# Patient Record
Sex: FEMALE | Race: WHITE | Employment: FULL TIME | URBAN - METROPOLITAN AREA
[De-identification: names, ages, dates, MRNs, and addresses within clinical notes are randomized per-mention and may not be internally consistent; named-entity substitution may affect disease eponyms.]

---

## 2017-10-24 ENCOUNTER — HOSPITAL ENCOUNTER (OUTPATIENT)
Dept: WOMENS IMAGING | Age: 52
Discharge: HOME | End: 2017-10-24
Attending: FAMILY MEDICINE | Admitting: FAMILY MEDICINE

## 2017-10-24 DIAGNOSIS — Z12.39 BREAST CANCER SCREENING: ICD-10-CM

## 2017-10-24 LAB
ALBUMIN SERPL-MCNC: 4.3 GM/DL (ref 3.4–5)
ALP BLD-CCNC: 75 IU/L (ref 40–129)
ALT SERPL-CCNC: 25 U/L (ref 10–40)
ANION GAP SERPL CALCULATED.3IONS-SCNC: 12 MMOL/L (ref 4–16)
AST SERPL-CCNC: 21 IU/L (ref 15–37)
BASOPHILS ABSOLUTE: 0 K/CU MM
BASOPHILS RELATIVE PERCENT: 0.4 % (ref 0–1)
BILIRUB SERPL-MCNC: 1.2 MG/DL (ref 0–1)
BUN BLDV-MCNC: 8 MG/DL (ref 6–23)
CALCIUM SERPL-MCNC: 9.2 MG/DL (ref 8.3–10.6)
CHLORIDE BLD-SCNC: 102 MMOL/L (ref 99–110)
CHOLESTEROL: 221 MG/DL
CO2: 26 MMOL/L (ref 21–32)
CREAT SERPL-MCNC: 0.5 MG/DL (ref 0.6–1.1)
DIFFERENTIAL TYPE: ABNORMAL
EOSINOPHILS ABSOLUTE: 0.1 K/CU MM
EOSINOPHILS RELATIVE PERCENT: 1.6 % (ref 0–3)
GFR AFRICAN AMERICAN: >60 ML/MIN/1.73M2
GFR NON-AFRICAN AMERICAN: >60 ML/MIN/1.73M2
GLUCOSE FASTING: 93 MG/DL (ref 70–99)
HCT VFR BLD CALC: 38.4 % (ref 37–47)
HDLC SERPL-MCNC: 56 MG/DL
HEMOGLOBIN: 12.9 GM/DL (ref 12.5–16)
IMMATURE NEUTROPHIL %: 0.2 % (ref 0–0.43)
LDL CHOLESTEROL DIRECT: 149 MG/DL
LYMPHOCYTES ABSOLUTE: 1.2 K/CU MM
LYMPHOCYTES RELATIVE PERCENT: 24.9 % (ref 24–44)
MCH RBC QN AUTO: 33.9 PG (ref 27–31)
MCHC RBC AUTO-ENTMCNC: 33.6 % (ref 32–36)
MCV RBC AUTO: 100.8 FL (ref 78–100)
MONOCYTES ABSOLUTE: 0.4 K/CU MM
MONOCYTES RELATIVE PERCENT: 7.2 % (ref 0–4)
NUCLEATED RBC %: 0 %
PDW BLD-RTO: 11.8 % (ref 11.7–14.9)
PLATELET # BLD: 191 K/CU MM (ref 140–440)
PMV BLD AUTO: 10.6 FL (ref 7.5–11.1)
POTASSIUM SERPL-SCNC: 4.4 MMOL/L (ref 3.5–5.1)
RBC # BLD: 3.81 M/CU MM (ref 4.2–5.4)
SEGMENTED NEUTROPHILS ABSOLUTE COUNT: 3.3 K/CU MM
SEGMENTED NEUTROPHILS RELATIVE PERCENT: 65.7 % (ref 36–66)
SODIUM BLD-SCNC: 140 MMOL/L (ref 135–145)
T3 FREE: 3 PG/ML (ref 2.3–4.2)
T4 FREE: 1.19 NG/DL (ref 0.9–1.8)
TOTAL IMMATURE NEUTOROPHIL: 0.01 K/CU MM
TOTAL NUCLEATED RBC: 0 K/CU MM
TOTAL PROTEIN: 6.5 GM/DL (ref 6.4–8.2)
TRIGL SERPL-MCNC: 144 MG/DL
TSH HIGH SENSITIVITY: 2.53 UIU/ML (ref 0.27–4.2)
WBC # BLD: 5 K/CU MM (ref 4–10.5)

## 2017-10-26 ENCOUNTER — HOSPITAL ENCOUNTER (OUTPATIENT)
Dept: WOMENS IMAGING | Age: 52
Discharge: HOME | End: 2017-10-26
Attending: FAMILY MEDICINE | Admitting: FAMILY MEDICINE

## 2017-10-26 DIAGNOSIS — R92.0 BREAST MICROCALCIFICATIONS: ICD-10-CM

## 2018-11-07 ENCOUNTER — HOSPITAL ENCOUNTER (OUTPATIENT)
Age: 53
Discharge: HOME | End: 2018-11-07
Payer: COMMERCIAL

## 2018-11-07 LAB
ALBUMIN SERPL-MCNC: 4.5 GM/DL (ref 3.4–5)
ALP BLD-CCNC: 87 IU/L (ref 40–128)
ALT SERPL-CCNC: 13 U/L (ref 10–40)
ANION GAP SERPL CALCULATED.3IONS-SCNC: 13 MMOL/L (ref 4–16)
AST SERPL-CCNC: 17 IU/L (ref 15–37)
BASOPHILS ABSOLUTE: 0 K/CU MM
BASOPHILS RELATIVE PERCENT: 0.7 % (ref 0–1)
BILIRUB SERPL-MCNC: 1.1 MG/DL (ref 0–1)
BUN BLDV-MCNC: 9 MG/DL (ref 6–23)
CALCIUM SERPL-MCNC: 9.2 MG/DL (ref 8.3–10.6)
CHLORIDE BLD-SCNC: 99 MMOL/L (ref 99–110)
CHOLESTEROL: 254 MG/DL
CO2: 26 MMOL/L (ref 21–32)
CREAT SERPL-MCNC: 0.6 MG/DL (ref 0.6–1.1)
DIFFERENTIAL TYPE: ABNORMAL
EOSINOPHILS ABSOLUTE: 0.1 K/CU MM
EOSINOPHILS RELATIVE PERCENT: 1.1 % (ref 0–3)
GFR AFRICAN AMERICAN: >60 ML/MIN/1.73M2
GFR NON-AFRICAN AMERICAN: >60 ML/MIN/1.73M2
GLUCOSE FASTING: 88 MG/DL (ref 70–99)
HCT VFR BLD CALC: 41.2 % (ref 37–47)
HDLC SERPL-MCNC: 63 MG/DL
HEMOGLOBIN: 13.3 GM/DL (ref 12.5–16)
IMMATURE NEUTROPHIL %: 0.7 % (ref 0–0.43)
LDL CHOLESTEROL DIRECT: 186 MG/DL
LYMPHOCYTES ABSOLUTE: 1.4 K/CU MM
LYMPHOCYTES RELATIVE PERCENT: 26.2 % (ref 24–44)
MCH RBC QN AUTO: 32.9 PG (ref 27–31)
MCHC RBC AUTO-ENTMCNC: 32.3 % (ref 32–36)
MCV RBC AUTO: 102 FL (ref 78–100)
MONOCYTES ABSOLUTE: 0.4 K/CU MM
MONOCYTES RELATIVE PERCENT: 7.2 % (ref 0–4)
NUCLEATED RBC %: 0 %
PDW BLD-RTO: 12 % (ref 11.7–14.9)
PLATELET # BLD: 201 K/CU MM (ref 140–440)
PMV BLD AUTO: 10.3 FL (ref 7.5–11.1)
POTASSIUM SERPL-SCNC: 4.1 MMOL/L (ref 3.5–5.1)
RBC # BLD: 4.04 M/CU MM (ref 4.2–5.4)
SEGMENTED NEUTROPHILS ABSOLUTE COUNT: 3.5 K/CU MM
SEGMENTED NEUTROPHILS RELATIVE PERCENT: 64.1 % (ref 36–66)
SODIUM BLD-SCNC: 138 MMOL/L (ref 135–145)
T3 FREE: 2.5 PG/ML (ref 2.3–4.2)
T4 FREE: 0.99 NG/DL (ref 0.9–1.8)
TOTAL IMMATURE NEUTOROPHIL: 0.04 K/CU MM
TOTAL NUCLEATED RBC: 0 K/CU MM
TOTAL PROTEIN: 6.6 GM/DL (ref 6.4–8.2)
TRIGL SERPL-MCNC: 149 MG/DL
TSH HIGH SENSITIVITY: 4 UIU/ML (ref 0.27–4.2)
WBC # BLD: 5.4 K/CU MM (ref 4–10.5)

## 2018-11-07 PROCEDURE — 84439 ASSAY OF FREE THYROXINE: CPT

## 2018-11-07 PROCEDURE — 84481 FREE ASSAY (FT-3): CPT

## 2018-11-07 PROCEDURE — 80061 LIPID PANEL: CPT

## 2018-11-07 PROCEDURE — 80053 COMPREHEN METABOLIC PANEL: CPT

## 2018-11-07 PROCEDURE — 83721 ASSAY OF BLOOD LIPOPROTEIN: CPT

## 2018-11-07 PROCEDURE — 84443 ASSAY THYROID STIM HORMONE: CPT

## 2018-11-07 PROCEDURE — 36415 COLL VENOUS BLD VENIPUNCTURE: CPT

## 2018-11-07 PROCEDURE — 85025 COMPLETE CBC W/AUTO DIFF WBC: CPT

## 2019-09-04 ENCOUNTER — HOSPITAL ENCOUNTER (OUTPATIENT)
Dept: WOMENS IMAGING | Age: 54
Discharge: HOME | End: 2019-09-04
Payer: COMMERCIAL

## 2019-09-04 DIAGNOSIS — Z12.31 VISIT FOR SCREENING MAMMOGRAM: ICD-10-CM

## 2019-09-04 DIAGNOSIS — M25.551 RIGHT HIP PAIN: ICD-10-CM

## 2019-09-04 PROCEDURE — 77063 BREAST TOMOSYNTHESIS BI: CPT

## 2019-09-04 PROCEDURE — 77080 DXA BONE DENSITY AXIAL: CPT

## 2019-09-11 ENCOUNTER — HOSPITAL ENCOUNTER (OUTPATIENT)
Dept: ULTRASOUND IMAGING | Age: 54
Discharge: HOME | End: 2019-09-11
Payer: COMMERCIAL

## 2019-09-11 DIAGNOSIS — R92.8 ABNORMAL MAMMOGRAM: ICD-10-CM

## 2019-09-11 PROCEDURE — 76642 ULTRASOUND BREAST LIMITED: CPT

## 2019-12-30 ENCOUNTER — HOSPITAL ENCOUNTER (OUTPATIENT)
Age: 54
Discharge: HOME | End: 2019-12-30
Payer: COMMERCIAL

## 2019-12-30 ENCOUNTER — HOSPITAL ENCOUNTER (OUTPATIENT)
Dept: GENERAL RADIOLOGY | Age: 54
Discharge: HOME | End: 2019-12-30
Payer: COMMERCIAL

## 2019-12-30 DIAGNOSIS — R05.9 COUGH: ICD-10-CM

## 2019-12-30 LAB
ALBUMIN SERPL-MCNC: 4.4 GM/DL (ref 3.4–5)
ALP BLD-CCNC: 100 IU/L (ref 40–129)
ALT SERPL-CCNC: 15 U/L (ref 10–40)
ANION GAP SERPL CALCULATED.3IONS-SCNC: 11 MMOL/L (ref 4–16)
AST SERPL-CCNC: 15 IU/L (ref 15–37)
BASOPHILS ABSOLUTE: 0.1 K/CU MM
BASOPHILS RELATIVE PERCENT: 0.9 % (ref 0–1)
BILIRUB SERPL-MCNC: 1.1 MG/DL (ref 0–1)
BUN BLDV-MCNC: 12 MG/DL (ref 6–23)
CALCIUM SERPL-MCNC: 9.6 MG/DL (ref 8.3–10.6)
CHLORIDE BLD-SCNC: 101 MMOL/L (ref 99–110)
CHOLESTEROL: 226 MG/DL
CO2: 27 MMOL/L (ref 21–32)
CREAT SERPL-MCNC: 0.6 MG/DL (ref 0.6–1.1)
DIFFERENTIAL TYPE: ABNORMAL
EOSINOPHILS ABSOLUTE: 0.2 K/CU MM
EOSINOPHILS RELATIVE PERCENT: 3 % (ref 0–3)
GFR AFRICAN AMERICAN: >60 ML/MIN/1.73M2
GFR NON-AFRICAN AMERICAN: >60 ML/MIN/1.73M2
GLUCOSE BLD-MCNC: 82 MG/DL (ref 70–99)
HCT VFR BLD CALC: 44.4 % (ref 37–47)
HDLC SERPL-MCNC: 58 MG/DL
HEMOGLOBIN: 13.9 GM/DL (ref 12.5–16)
IMMATURE NEUTROPHIL %: 1.1 % (ref 0–0.43)
LDL CHOLESTEROL DIRECT: 140 MG/DL
LYMPHOCYTES ABSOLUTE: 1.4 K/CU MM
LYMPHOCYTES RELATIVE PERCENT: 24.2 % (ref 24–44)
MCH RBC QN AUTO: 31.5 PG (ref 27–31)
MCHC RBC AUTO-ENTMCNC: 31.3 % (ref 32–36)
MCV RBC AUTO: 100.7 FL (ref 78–100)
MONOCYTES ABSOLUTE: 0.4 K/CU MM
MONOCYTES RELATIVE PERCENT: 7.7 % (ref 0–4)
NUCLEATED RBC %: 0 %
PDW BLD-RTO: 12.1 % (ref 11.7–14.9)
PLATELET # BLD: 233 K/CU MM (ref 140–440)
PMV BLD AUTO: 10.4 FL (ref 7.5–11.1)
POTASSIUM SERPL-SCNC: 4 MMOL/L (ref 3.5–5.1)
RBC # BLD: 4.41 M/CU MM (ref 4.2–5.4)
SEGMENTED NEUTROPHILS ABSOLUTE COUNT: 3.6 K/CU MM
SEGMENTED NEUTROPHILS RELATIVE PERCENT: 63.1 % (ref 36–66)
SODIUM BLD-SCNC: 139 MMOL/L (ref 135–145)
T3 FREE: 2.9 PG/ML (ref 2.3–4.2)
T4 FREE: 1.25 NG/DL (ref 0.9–1.8)
TOTAL IMMATURE NEUTOROPHIL: 0.06 K/CU MM
TOTAL NUCLEATED RBC: 0 K/CU MM
TOTAL PROTEIN: 6.7 GM/DL (ref 6.4–8.2)
TRIGL SERPL-MCNC: 223 MG/DL
TSH HIGH SENSITIVITY: 2.56 UIU/ML (ref 0.27–4.2)
WBC # BLD: 5.7 K/CU MM (ref 4–10.5)

## 2019-12-30 PROCEDURE — 80053 COMPREHEN METABOLIC PANEL: CPT

## 2019-12-30 PROCEDURE — 85025 COMPLETE CBC W/AUTO DIFF WBC: CPT

## 2019-12-30 PROCEDURE — 36415 COLL VENOUS BLD VENIPUNCTURE: CPT

## 2019-12-30 PROCEDURE — 71046 X-RAY EXAM CHEST 2 VIEWS: CPT

## 2019-12-30 PROCEDURE — 80061 LIPID PANEL: CPT

## 2019-12-30 PROCEDURE — 84481 FREE ASSAY (FT-3): CPT

## 2019-12-30 PROCEDURE — 84443 ASSAY THYROID STIM HORMONE: CPT

## 2019-12-30 PROCEDURE — 84439 ASSAY OF FREE THYROXINE: CPT

## 2019-12-30 PROCEDURE — 83721 ASSAY OF BLOOD LIPOPROTEIN: CPT

## 2020-06-03 ENCOUNTER — OFFICE VISIT (OUTPATIENT)
Dept: PRIMARY CARE CLINIC | Age: 55
End: 2020-06-03
Payer: COMMERCIAL

## 2020-06-03 ENCOUNTER — HOSPITAL ENCOUNTER (OUTPATIENT)
Age: 55
Setting detail: SPECIMEN
Discharge: HOME | End: 2020-06-03
Payer: COMMERCIAL

## 2020-06-03 VITALS — OXYGEN SATURATION: 99 % | TEMPERATURE: 97 F | HEART RATE: 77 BPM

## 2020-06-03 PROCEDURE — U0002 COVID-19 LAB TEST NON-CDC: HCPCS

## 2020-06-03 PROCEDURE — 99213 OFFICE O/P EST LOW 20 MIN: CPT | Performed by: FAMILY MEDICINE

## 2020-06-03 RX ORDER — ALBUTEROL SULFATE 90 UG/1
2 AEROSOL, METERED RESPIRATORY (INHALATION) 4 TIMES DAILY PRN
Qty: 1 INHALER | Refills: 2 | Status: SHIPPED | OUTPATIENT
Start: 2020-06-03 | End: 2021-12-15

## 2020-06-03 RX ORDER — PREDNISONE 10 MG/1
10 TABLET ORAL DAILY
Qty: 10 TABLET | Refills: 0 | Status: SHIPPED | OUTPATIENT
Start: 2020-06-03 | End: 2020-06-13

## 2020-06-03 NOTE — PATIENT INSTRUCTIONS
- Your covid-19 testing result takes 3-4 days to come back. [address] will notify the test result if it is POSITIVE. In case of NEGATIVE result, our office will notify you. You can also view the result through  [facility] [address]. - Fluid hydration with plain water was advised. - As needed bronchodilators for shortness of breath( Albuterol)  and short course of oral Prednisone to reduce inflammation  - Stay at home and self quarantine until COVID-19 test result is available. If your COVID-19 test result is POSITIVE, 14 day self quarantine is recommended. If the test results is NEGATIVE, you can end your quarantine and return to your normal routine or work. - Please return to clinic or call us if symptoms are worsened.

## 2020-06-04 LAB
SARS-COV-2: NOT DETECTED
SOURCE: NORMAL

## 2021-02-24 ENCOUNTER — HOSPITAL ENCOUNTER (OUTPATIENT)
Age: 56
Discharge: HOME | End: 2021-02-24
Payer: COMMERCIAL

## 2021-02-24 ENCOUNTER — HOSPITAL ENCOUNTER (OUTPATIENT)
Dept: GENERAL RADIOLOGY | Age: 56
Discharge: HOME | End: 2021-02-24
Payer: COMMERCIAL

## 2021-02-24 DIAGNOSIS — R05.9 COUGH: ICD-10-CM

## 2021-02-24 LAB
ALBUMIN SERPL-MCNC: 4.4 GM/DL (ref 3.4–5)
ALP BLD-CCNC: 82 IU/L (ref 40–128)
ALT SERPL-CCNC: 15 U/L (ref 10–40)
ANION GAP SERPL CALCULATED.3IONS-SCNC: 7 MMOL/L (ref 4–16)
AST SERPL-CCNC: 17 IU/L (ref 15–37)
BASOPHILS ABSOLUTE: 0 K/CU MM
BASOPHILS RELATIVE PERCENT: 0.7 % (ref 0–1)
BILIRUB SERPL-MCNC: 0.8 MG/DL (ref 0–1)
BUN BLDV-MCNC: 15 MG/DL (ref 6–23)
CALCIUM SERPL-MCNC: 9.1 MG/DL (ref 8.3–10.6)
CHLORIDE BLD-SCNC: 103 MMOL/L (ref 99–110)
CHOLESTEROL: 192 MG/DL
CO2: 29 MMOL/L (ref 21–32)
CREAT SERPL-MCNC: 0.6 MG/DL (ref 0.6–1.1)
DIFFERENTIAL TYPE: ABNORMAL
EOSINOPHILS ABSOLUTE: 0.1 K/CU MM
EOSINOPHILS RELATIVE PERCENT: 2.5 % (ref 0–3)
ESTIMATED AVERAGE GLUCOSE: 108 MG/DL
GFR AFRICAN AMERICAN: >60 ML/MIN/1.73M2
GFR NON-AFRICAN AMERICAN: >60 ML/MIN/1.73M2
GLUCOSE BLD-MCNC: 98 MG/DL (ref 70–99)
HBA1C MFR BLD: 5.4 % (ref 4.2–6.3)
HCT VFR BLD CALC: 39.4 % (ref 37–47)
HDLC SERPL-MCNC: 60 MG/DL
HEMOGLOBIN: 12.6 GM/DL (ref 12.5–16)
IMMATURE NEUTROPHIL %: 0.5 % (ref 0–0.43)
LDL CHOLESTEROL DIRECT: 117 MG/DL
LYMPHOCYTES ABSOLUTE: 1.3 K/CU MM
LYMPHOCYTES RELATIVE PERCENT: 29.3 % (ref 24–44)
MCH RBC QN AUTO: 31.9 PG (ref 27–31)
MCHC RBC AUTO-ENTMCNC: 32 % (ref 32–36)
MCV RBC AUTO: 99.7 FL (ref 78–100)
MONOCYTES ABSOLUTE: 0.3 K/CU MM
MONOCYTES RELATIVE PERCENT: 7 % (ref 0–4)
NUCLEATED RBC %: 0 %
PDW BLD-RTO: 11.9 % (ref 11.7–14.9)
PLATELET # BLD: 202 K/CU MM (ref 140–440)
PMV BLD AUTO: 10.4 FL (ref 7.5–11.1)
POTASSIUM SERPL-SCNC: 4.5 MMOL/L (ref 3.5–5.1)
RBC # BLD: 3.95 M/CU MM (ref 4.2–5.4)
SEGMENTED NEUTROPHILS ABSOLUTE COUNT: 2.7 K/CU MM
SEGMENTED NEUTROPHILS RELATIVE PERCENT: 60 % (ref 36–66)
SODIUM BLD-SCNC: 139 MMOL/L (ref 135–145)
T3 FREE: 2.8 PG/ML (ref 2.3–4.2)
T4 FREE: 1.04 NG/DL (ref 0.9–1.8)
TOTAL IMMATURE NEUTOROPHIL: 0.02 K/CU MM
TOTAL NUCLEATED RBC: 0 K/CU MM
TOTAL PROTEIN: 6.7 GM/DL (ref 6.4–8.2)
TRIGL SERPL-MCNC: 93 MG/DL
TSH HIGH SENSITIVITY: 1.65 UIU/ML (ref 0.27–4.2)
WBC # BLD: 4.4 K/CU MM (ref 4–10.5)

## 2021-02-24 PROCEDURE — 84439 ASSAY OF FREE THYROXINE: CPT

## 2021-02-24 PROCEDURE — 83721 ASSAY OF BLOOD LIPOPROTEIN: CPT

## 2021-02-24 PROCEDURE — 71046 X-RAY EXAM CHEST 2 VIEWS: CPT

## 2021-02-24 PROCEDURE — 80061 LIPID PANEL: CPT

## 2021-02-24 PROCEDURE — 80053 COMPREHEN METABOLIC PANEL: CPT

## 2021-02-24 PROCEDURE — 36415 COLL VENOUS BLD VENIPUNCTURE: CPT

## 2021-02-24 PROCEDURE — 84443 ASSAY THYROID STIM HORMONE: CPT

## 2021-02-24 PROCEDURE — 84481 FREE ASSAY (FT-3): CPT

## 2021-02-24 PROCEDURE — 85025 COMPLETE CBC W/AUTO DIFF WBC: CPT

## 2021-02-24 PROCEDURE — 83036 HEMOGLOBIN GLYCOSYLATED A1C: CPT

## 2021-08-03 RX ORDER — CLOBETASOL PROPIONATE 0.5 MG/G
OINTMENT TOPICAL
Qty: 1 TUBE | Refills: 0 | Status: SHIPPED | OUTPATIENT
Start: 2021-08-03 | End: 2021-12-15

## 2021-09-27 ENCOUNTER — HOSPITAL ENCOUNTER (OUTPATIENT)
Dept: WOMENS IMAGING | Age: 56
Discharge: HOME | End: 2021-09-27
Payer: COMMERCIAL

## 2021-09-27 DIAGNOSIS — Z12.31 ENCOUNTER FOR SCREENING MAMMOGRAM FOR MALIGNANT NEOPLASM OF BREAST: ICD-10-CM

## 2021-09-27 PROCEDURE — 77063 BREAST TOMOSYNTHESIS BI: CPT

## 2021-11-10 ENCOUNTER — HOSPITAL ENCOUNTER (OUTPATIENT)
Dept: WOMENS IMAGING | Age: 56
Discharge: HOME | End: 2021-11-10
Payer: COMMERCIAL

## 2021-11-10 ENCOUNTER — HOSPITAL ENCOUNTER (OUTPATIENT)
Dept: ULTRASOUND IMAGING | Age: 56
Discharge: HOME | End: 2021-11-10
Payer: COMMERCIAL

## 2021-11-10 DIAGNOSIS — R92.8 ABNORMAL SCREENING MAMMOGRAM: ICD-10-CM

## 2021-11-10 PROCEDURE — G0279 TOMOSYNTHESIS, MAMMO: HCPCS

## 2021-11-10 PROCEDURE — 76642 ULTRASOUND BREAST LIMITED: CPT

## 2021-12-10 ENCOUNTER — HOSPITAL ENCOUNTER (OUTPATIENT)
Dept: WOMENS IMAGING | Age: 56
Discharge: HOME | End: 2021-12-10
Payer: COMMERCIAL

## 2021-12-10 ENCOUNTER — HOSPITAL ENCOUNTER (OUTPATIENT)
Dept: ULTRASOUND IMAGING | Age: 56
Discharge: HOME | End: 2021-12-10
Payer: COMMERCIAL

## 2021-12-10 DIAGNOSIS — N63.20 LEFT BREAST MASS: ICD-10-CM

## 2021-12-10 PROCEDURE — 88342 IMHCHEM/IMCYTCHM 1ST ANTB: CPT

## 2021-12-10 PROCEDURE — 88360 TUMOR IMMUNOHISTOCHEM/MANUAL: CPT

## 2021-12-10 PROCEDURE — 88341 IMHCHEM/IMCYTCHM EA ADD ANTB: CPT

## 2021-12-10 PROCEDURE — 88305 TISSUE EXAM BY PATHOLOGIST: CPT

## 2021-12-10 PROCEDURE — 2720000010 US BREAST BIOPSY W LOC DEVICE 1ST LESION LEFT

## 2021-12-10 PROCEDURE — 77065 DX MAMMO INCL CAD UNI: CPT

## 2021-12-16 ENCOUNTER — HOSPITAL ENCOUNTER (OUTPATIENT)
Age: 56
Discharge: HOME | End: 2021-12-16
Payer: COMMERCIAL

## 2021-12-16 ENCOUNTER — HOSPITAL ENCOUNTER (OUTPATIENT)
Dept: GENERAL RADIOLOGY | Age: 56
Discharge: HOME | End: 2021-12-16
Payer: COMMERCIAL

## 2021-12-16 DIAGNOSIS — C50.919 TRIPLE NEGATIVE MALIGNANT NEOPLASM OF BREAST (HCC): ICD-10-CM

## 2021-12-16 LAB
ALBUMIN SERPL-MCNC: 4.6 GM/DL (ref 3.4–5)
ALP BLD-CCNC: 155 IU/L (ref 40–129)
ALT SERPL-CCNC: 54 U/L (ref 10–40)
ANION GAP SERPL CALCULATED.3IONS-SCNC: 7 MMOL/L (ref 4–16)
AST SERPL-CCNC: 37 IU/L (ref 15–37)
BASOPHILS ABSOLUTE: 0.1 K/CU MM
BASOPHILS RELATIVE PERCENT: 1 % (ref 0–1)
BILIRUB SERPL-MCNC: 0.6 MG/DL (ref 0–1)
BILIRUBIN DIRECT: 0.2 MG/DL (ref 0–0.3)
BILIRUBIN, INDIRECT: 0.4 MG/DL (ref 0–0.7)
BUN BLDV-MCNC: 11 MG/DL (ref 6–23)
CALCIUM SERPL-MCNC: 9.4 MG/DL (ref 8.3–10.6)
CHLORIDE BLD-SCNC: 101 MMOL/L (ref 99–110)
CO2: 30 MMOL/L (ref 21–32)
CREAT SERPL-MCNC: 0.4 MG/DL (ref 0.6–1.1)
DIFFERENTIAL TYPE: ABNORMAL
EOSINOPHILS ABSOLUTE: 0.1 K/CU MM
EOSINOPHILS RELATIVE PERCENT: 2.9 % (ref 0–3)
GFR AFRICAN AMERICAN: >60 ML/MIN/1.73M2
GFR NON-AFRICAN AMERICAN: >60 ML/MIN/1.73M2
GLUCOSE BLD-MCNC: 100 MG/DL (ref 70–99)
HCT VFR BLD CALC: 39.6 % (ref 37–47)
HEMOGLOBIN: 12.6 GM/DL (ref 12.5–16)
IMMATURE NEUTROPHIL %: 1.3 % (ref 0–0.43)
LYMPHOCYTES ABSOLUTE: 1.4 K/CU MM
LYMPHOCYTES RELATIVE PERCENT: 28.3 % (ref 24–44)
MCH RBC QN AUTO: 32 PG (ref 27–31)
MCHC RBC AUTO-ENTMCNC: 31.8 % (ref 32–36)
MCV RBC AUTO: 100.5 FL (ref 78–100)
MONOCYTES ABSOLUTE: 0.4 K/CU MM
MONOCYTES RELATIVE PERCENT: 9.2 % (ref 0–4)
NUCLEATED RBC %: 0 %
PDW BLD-RTO: 11.6 % (ref 11.7–14.9)
PLATELET # BLD: 244 K/CU MM (ref 140–440)
PMV BLD AUTO: 10.2 FL (ref 7.5–11.1)
POTASSIUM SERPL-SCNC: 4.6 MMOL/L (ref 3.5–5.1)
RBC # BLD: 3.94 M/CU MM (ref 4.2–5.4)
SEGMENTED NEUTROPHILS ABSOLUTE COUNT: 2.8 K/CU MM
SEGMENTED NEUTROPHILS RELATIVE PERCENT: 57.3 % (ref 36–66)
SODIUM BLD-SCNC: 138 MMOL/L (ref 135–145)
TOTAL IMMATURE NEUTOROPHIL: 0.06 K/CU MM
TOTAL NUCLEATED RBC: 0 K/CU MM
TOTAL PROTEIN: 6.7 GM/DL (ref 6.4–8.2)
WBC # BLD: 4.8 K/CU MM (ref 4–10.5)

## 2021-12-16 PROCEDURE — 82248 BILIRUBIN DIRECT: CPT

## 2021-12-16 PROCEDURE — 80053 COMPREHEN METABOLIC PANEL: CPT

## 2021-12-16 PROCEDURE — 36415 COLL VENOUS BLD VENIPUNCTURE: CPT

## 2021-12-16 PROCEDURE — 71046 X-RAY EXAM CHEST 2 VIEWS: CPT

## 2021-12-16 PROCEDURE — 85025 COMPLETE CBC W/AUTO DIFF WBC: CPT

## 2021-12-27 ENCOUNTER — HOSPITAL ENCOUNTER (OUTPATIENT)
Dept: INFUSION THERAPY | Age: 56
Discharge: HOME | End: 2021-12-27
Payer: COMMERCIAL

## 2021-12-27 ENCOUNTER — INITIAL CONSULT (OUTPATIENT)
Dept: ONCOLOGY | Age: 56
End: 2021-12-27
Payer: COMMERCIAL

## 2021-12-27 VITALS
RESPIRATION RATE: 18 BRPM | HEIGHT: 61 IN | TEMPERATURE: 97.5 F | OXYGEN SATURATION: 98 % | BODY MASS INDEX: 44.37 KG/M2 | SYSTOLIC BLOOD PRESSURE: 153 MMHG | DIASTOLIC BLOOD PRESSURE: 88 MMHG | WEIGHT: 235 LBS | HEART RATE: 77 BPM

## 2021-12-27 DIAGNOSIS — C50.312 MALIGNANT NEOPLASM OF LOWER-INNER QUADRANT OF LEFT BREAST IN FEMALE, ESTROGEN RECEPTOR NEGATIVE (HCC): ICD-10-CM

## 2021-12-27 DIAGNOSIS — R74.8 ELEVATED LIVER ENZYMES: ICD-10-CM

## 2021-12-27 DIAGNOSIS — Z17.1 MALIGNANT NEOPLASM OF LOWER-INNER QUADRANT OF LEFT BREAST IN FEMALE, ESTROGEN RECEPTOR NEGATIVE (HCC): ICD-10-CM

## 2021-12-27 DIAGNOSIS — R91.8 LUNG NODULES: ICD-10-CM

## 2021-12-27 PROBLEM — C50.319 MALIGNANT NEOPLASM OF LOWER-INNER QUADRANT OF BREAST IN FEMALE, ESTROGEN RECEPTOR NEGATIVE (HCC): Status: ACTIVE | Noted: 2021-12-27

## 2021-12-27 PROCEDURE — 99202 OFFICE O/P NEW SF 15 MIN: CPT

## 2021-12-27 PROCEDURE — 99204 OFFICE O/P NEW MOD 45 MIN: CPT | Performed by: INTERNAL MEDICINE

## 2021-12-27 RX ORDER — FLUOXETINE HYDROCHLORIDE 20 MG/1
20 CAPSULE ORAL DAILY
COMMUNITY

## 2021-12-27 RX ORDER — IBUPROFEN 600 MG/1
600 TABLET ORAL EVERY 6 HOURS PRN
COMMUNITY

## 2021-12-27 NOTE — PROGRESS NOTES
Patient Name:  Junior Foley  Patient :  1965  Patient MRN:  [id number]     Primary Oncologist: Jamila Rosenberg MD  Referring Provider: Mary Jensen PA-C     Date of Service: 2021      Reason for Consult:  TNBC     Chief Complaint:    Chief Complaint   Patient presents with   Araceli Ybarra Patient       Encounter Diagnosis   Name Primary?  Malignant neoplasm of lower-inner quadrant of left breast in female, estrogen receptor negative ([facility] [address]         HPI:   2021: She had with her  to the clinic today. Reported that she was found to have a mass in the left breast on routine mammogram.  Denied any axial lymphadenopathy. Denied any weight loss. Denied any new bone pains. Denied any focal weakness. Reported blurred vision, needs new glasses. Headaches lately. But reported that she recently quit drinking alcohol. Denied any chest pain or increase shortness of breath. Denied any abdominal pain, nausea vomiting, diarrhea, constipation    21: Mammogram:  Recommend spot gene synthesis compression views and possible ultrasound for   focal asymmetry in the left upper inner breast, middle depth.       BIRADS:   BIRADS - CATEGORY 0       Incomplete: Needs Additional Imaging Evaluation     11/10/21: Mammogram:  0.7 x 0.8 x 0.7 cm suspicious mass left breast 8 o'clock, about 3-4 cm from   the left nipple.  This likely correlates with the mammographic finding. Ultrasound-guided biopsy is advised.       Recommend post biopsy mammographic correlation to confirm that it correlates   with the mammographic finding.       These results and recommendations were discussed with the patient by Dr. Raul Cannon will be assisted to schedule the recommended biopsy by the breast   nurse navigator.  An order will be requested from her referring physician.       BIRADS:   BIRADS - CATEGORY 4B       Intermediate suspicion for malignancy.       Suspicious Abnormality. Biopsy should be considered at this time. consumed 4-5 beers per day and also whiskey sometimes. Currently on sick leave, was an  at [employer] Family History:    Father diagnosed with prostate cancer                                                                                               No Known Allergies    Current Outpatient Medications on File Prior to Visit   Medication Sig Dispense Refill    FLUoxetine (PROZAC) 20 MG capsule Take 20 mg by mouth daily      ibuprofen (ADVIL;MOTRIN) 600 MG tablet Take 600 mg by mouth every 6 hours as needed for Pain      ATORVASTATIN CALCIUM PO Take by mouth      lisinopril (PRINIVIL;ZESTRIL) 20 MG tablet TAKE ONE TABLET BY MOUTH DAILY 90 tablet 1    acyclovir (ZOVIRAX) 400 MG tablet Take 1 tablet by mouth daily 90 tablet 1    conjugated estrogens (PREMARIN) 0.625 MG/GM vaginal cream Place vaginally twice weekly 1 Tube 5     No current facility-administered medications on file prior to visit. Review of Systems:    Constitutional:  No weight loss, No fever, No chills, No night sweats. Energy level fair to poor  Eyes:  No diplopia, No transient or permanent loss of vision, No scotomata. ENT / Mouth:  No epistaxis, No dysphagia, No hoarseness, No oral ulcers, No gingival bleeding. No sore throat, No postnasal drip, No nasal drip, No mouth pain, No sinus pain, No tinnitus, Normal hearing. Cardiovascular:  No chest pain, No palpitations, No syncope, No upper extremity edema, No lower extremity edema, No calf discomfort. Respiratory:  No cough. No hemoptysis, No pleurisy, No wheezing, No dyspnea. Breast:  No breast mass, No pain, No nipple discharge, No change in size, No change in shape. Gastrointestinal:  No abdominal pain, No abdominal cramping, No nausea, No vomiting, No constipation, No diarrhea, No hematochezia, No melena, No jaundice, No dyspepsia, No dysphagia.   Urinary:  No dysuria, No hematuria, No urinary incontinence. Gynecological:  No vaginal discharge, No suprapubic pain, No abnormal vaginal bleeding. (Female Patients Only)  Musculoskeletal:  No muscle pain, No swollen joints, No joint redness, No bone pain, No spine tenderness. Skin:  No rash, No nodules, No pruritus, No lesions. Neurologic:  No confusion, No seizures, No syncope, No tremor, No speech change, No headache, No hiccups, No abnormal gait, No sensory changes, No weakness. Psychiatric:  No depression, No anxiety, Concentration normal.  Endocrine:  No polyuria, No polydipsia, No hot flashes, No thyroid symptoms. Hematologic:  No epistaxis, No gingival bleeding, No petechiae, No ecchymosis. Lymphatic:  No lymphadenopathy, No lymphedema. Allergy / Immunologic:  No eczema, No frequent mucous infections, No frequent respiratory infections, No recurrent urticarial, No frequent skin infections. Vital Signs: BP (!) 153/88 (Site: Right Upper Arm, Position: Sitting, Cuff Size: Large Adult)   Pulse 77   Temp 97.5 °F (36.4 °C) (Temporal)   Resp 18   Ht 5' 1\" (1.549 m)   Wt 235 lb (106.6 kg)   SpO2 98%   BMI 44.40 kg/m²      CONSTITUTIONAL: awake, alert, obese, tired appearing  EYES: IGNACIO, No pallor or any icterus  ENT: ATNC  NECK: No JVD  HEMATOLOGIC/LYMPHATIC: no cervical, supraclavicular or axillary lymphadenopathy   LUNGS: Occasional wheeze  CARDIOVASCULAR: s1s2 rrr no murmurs  ABDOMEN: soft ntnd bs pos  MUSCULOSKELETAL: full range of motion noted, tone is normal  NEUROLOGIC: GI  SKIN: No rash  EXTREMITIES: no LE edema bilaterally  Breast, left breast mass, did not initiate any lymphadenopathy.   No masses in the right breast or right lymphadenopathy       Labs:  Hematology:  Lab Results   Component Value Date    WBC 4.8 12/16/2021    RBC 3.94 (L) 12/16/2021    HGB 12.6 12/16/2021    HCT 39.6 12/16/2021    .5 (H) 12/16/2021    MCH 32.0 (H) 12/16/2021    MCHC 31.8 (L) 12/16/2021    RDW 11.6 (L) 12/16/2021  12/16/2021    MPV 10.2 12/16/2021    SEGSPCT 57.3 12/16/2021    EOSRELPCT 2.9 12/16/2021    BASOPCT 1.0 12/16/2021    LYMPHOPCT 28.3 12/16/2021    MONOPCT 9.2 (H) 12/16/2021    SEGSABS 2.8 12/16/2021    EOSABS 0.1 12/16/2021    BASOSABS 0.1 12/16/2021    LYMPHSABS 1.4 12/16/2021    MONOSABS 0.4 12/16/2021    DIFFTYPE AUTOMATED DIFFERENTIAL 12/16/2021     No results found for: ESR  Chemistry:  Lab Results   Component Value Date     12/16/2021    K 4.6 12/16/2021     12/16/2021    CO2 30 12/16/2021    BUN 11 12/16/2021    CREATININE 0.4 (L) 12/16/2021    GLUCOSE 100 (H) 12/16/2021    CALCIUM 9.4 12/16/2021    PROT 6.7 12/16/2021    LABALBU 4.6 12/16/2021    BILITOT 0.6 12/16/2021    ALKPHOS 155 (H) 12/16/2021    AST 37 12/16/2021    ALT 54 (H) 12/16/2021    LABGLOM >60 12/16/2021    GFRAA >60 12/16/2021    AGRATIO 1.7 10/11/2016    GLOB 2.4 10/11/2016     No results found for: MMA, LDH, HOMOCYSTEINE  No components found for: LD  Lab Results   Component Value Date    TSHHS 1.650 02/24/2021    T4FREE 1.04 02/24/2021    FT3 2.8 02/24/2021     Immunology:  Lab Results   Component Value Date    PROT 6.7 12/16/2021     No results found for: AMBER ClarkR  No results found for: B2M  Coagulation Panel:  No results found for: PROTIME, INR, APTT, DDIMER  Anemia Panel:  No results found for: [id number], FOLATE  Tumor Markers:  No results found for: , CEA, , LABCA2, PSA     Observations:  ECOG:  No data recorded     Assessment & Plan:                                                          Triple negative breast cancer, left, grade 2, no evidence of any axial lymphadenopathy per available imaging studies. PET scan ordered. Will obtain tumor markers. Discussed the findings, diagnosis, possible staging and also discussed neoadjuvant chemotherapy with AC followed by T followed by surgical resection followed by radiation pending surgical modality.   Discussed the adverse effects of doxorubicin, cyclophosphamide and Taxol. Answered all questions. PORT and OCM requested. Echocardiogram ordered. Dose adjustments according to adverse effects and cytopenias. Recommend growth factor support. Discussed genetic testing and counseling, consult placed. Will follow per NCCN guidelines after completion of all the treatments    Mild elevation of the liver enzymes, most probably secondary to alcohol. Continue to monitor. Recommend completing cessation of alcohol. Continue other medical care. Thank you for letting us be part of the care and will follow along. Discussed above findings and plan with her and she voiced understanding. Answered all her questions. Discussed healthy lifestyle including healthy diet, regular exercise as tolerated. Also discussed importance of being up-to-date with age-appropriate screening tools. Recommend follow-up with primary care physician and other specialists. Please do not hesitate to contact us if you need further information. Return to clinic January 19, 2022 or earlier if new Sx    I have recommended that the patient follow CDC guidelines for prevention of COVID-19 infection.     [id number]

## 2021-12-27 NOTE — PROGRESS NOTES
MA Rooming Questions  Patient: Alexandro Bui  MRN: [id number]    Date: 12/27/2021        JACKELIN Alejandra

## 2021-12-28 ENCOUNTER — TELEPHONE (OUTPATIENT)
Dept: ONCOLOGY | Age: 56
End: 2021-12-28

## 2021-12-28 NOTE — TELEPHONE ENCOUNTER
Pt is going for Pet scan on 1/17 at [facility] for a 100 arrival for a 130 appt-- gave pet prep-- left message for pt with all information

## 2022-01-05 ENCOUNTER — CLINICAL DOCUMENTATION (OUTPATIENT)
Dept: CASE MANAGEMENT | Age: 57
End: 2022-01-05

## 2022-01-05 NOTE — PROGRESS NOTES
Spoke with patient on phone. Reviewed upcoming appointments. Patient will have her port placement at [facility] on Monday, 1/10/22 per Dr. Franklin Reid. Echo scheduled for Tuesday, 1/11/22 at 1000 at [facility]. PET/CT scheduled for Monday, 1/17/22 at 1330 at [facility]. Will be scheduled the week of 1/17/22 for chemo education and first chemotherapy treatment [facility]). Patient voiced understanding of all above. Direct contact information given for patient to call with any other questions/concerns.

## 2022-01-12 ENCOUNTER — HOSPITAL ENCOUNTER (OUTPATIENT)
Dept: NON INVASIVE DIAGNOSTICS | Age: 57
Discharge: HOME | End: 2022-01-12
Payer: COMMERCIAL

## 2022-01-12 DIAGNOSIS — C50.312 MALIGNANT NEOPLASM OF LOWER-INNER QUADRANT OF LEFT BREAST IN FEMALE, ESTROGEN RECEPTOR NEGATIVE (HCC): ICD-10-CM

## 2022-01-12 DIAGNOSIS — Z17.1 MALIGNANT NEOPLASM OF LOWER-INNER QUADRANT OF LEFT BREAST IN FEMALE, ESTROGEN RECEPTOR NEGATIVE (HCC): ICD-10-CM

## 2022-01-12 PROCEDURE — 93308 TTE F-UP OR LMTD: CPT

## 2022-01-13 ENCOUNTER — CLINICAL DOCUMENTATION (OUTPATIENT)
Dept: CASE MANAGEMENT | Age: 57
End: 2022-01-13

## 2022-01-13 NOTE — PROGRESS NOTES
Patient scheduled for chemo education on Wednesday, 1/19/22 at 1300 and first chemotherapy treatment [facility]) on Thursday, 1/20/22 at 0930. Patient called with appointments - message left.

## 2022-01-17 ENCOUNTER — HOSPITAL ENCOUNTER (OUTPATIENT)
Dept: PET IMAGING | Age: 57
Discharge: HOME | End: 2022-01-17
Payer: COMMERCIAL

## 2022-01-17 DIAGNOSIS — C50.919 TRIPLE NEGATIVE MALIGNANT NEOPLASM OF BREAST (HCC): ICD-10-CM

## 2022-01-17 PROCEDURE — 3430000000 HC RX DIAGNOSTIC RADIOPHARMACEUTICAL: Performed by: SURGERY

## 2022-01-17 PROCEDURE — 2580000003 HC RX 258: Performed by: SURGERY

## 2022-01-17 PROCEDURE — A9552 F18 FDG: HCPCS | Performed by: SURGERY

## 2022-01-17 PROCEDURE — 78815 PET IMAGE W/CT SKULL-THIGH: CPT

## 2022-01-17 RX ORDER — FLUDEOXYGLUCOSE F 18 200 MCI/ML
17.21 INJECTION, SOLUTION INTRAVENOUS
Status: COMPLETED | OUTPATIENT
Start: 2022-01-17 | End: 2022-01-17

## 2022-01-17 RX ORDER — FLUDEOXYGLUCOSE F 18 200 MCI/ML
INJECTION, SOLUTION INTRAVENOUS
Status: CANCELLED | OUTPATIENT
Start: 2022-01-17

## 2022-01-17 RX ORDER — SODIUM CHLORIDE 0.9 % (FLUSH) 0.9 %
10 SYRINGE (ML) INJECTION PRN
Status: CANCELLED | OUTPATIENT
Start: 2022-01-17

## 2022-01-17 RX ORDER — SODIUM CHLORIDE 0.9 % (FLUSH) 0.9 %
10 SYRINGE (ML) INJECTION PRN
Status: COMPLETED | OUTPATIENT
Start: 2022-01-17 | End: 2022-01-17

## 2022-01-17 RX ADMIN — SODIUM CHLORIDE, PRESERVATIVE FREE 10 ML: 5 INJECTION INTRAVENOUS at 13:13

## 2022-01-17 RX ADMIN — FLUDEOXYGLUCOSE F 18 17.21 MILLICURIE: 200 INJECTION, SOLUTION INTRAVENOUS at 13:13

## 2022-01-18 ENCOUNTER — OFFICE VISIT (OUTPATIENT)
Dept: ONCOLOGY | Age: 57
End: 2022-01-18
Payer: COMMERCIAL

## 2022-01-18 ENCOUNTER — HOSPITAL ENCOUNTER (OUTPATIENT)
Dept: INFUSION THERAPY | Age: 57
Discharge: HOME | End: 2022-01-18

## 2022-01-18 VITALS
SYSTOLIC BLOOD PRESSURE: 132 MMHG | TEMPERATURE: 97.8 F | DIASTOLIC BLOOD PRESSURE: 81 MMHG | HEIGHT: 61 IN | BODY MASS INDEX: 44.93 KG/M2 | OXYGEN SATURATION: 97 % | HEART RATE: 86 BPM | WEIGHT: 238 LBS

## 2022-01-18 DIAGNOSIS — Z17.1 MALIGNANT NEOPLASM OF LOWER-INNER QUADRANT OF LEFT BREAST IN FEMALE, ESTROGEN RECEPTOR NEGATIVE (HCC): Primary | ICD-10-CM

## 2022-01-18 DIAGNOSIS — R91.8 LUNG NODULES: ICD-10-CM

## 2022-01-18 DIAGNOSIS — R74.8 ELEVATED LIVER ENZYMES: ICD-10-CM

## 2022-01-18 DIAGNOSIS — C50.312 MALIGNANT NEOPLASM OF LOWER-INNER QUADRANT OF LEFT BREAST IN FEMALE, ESTROGEN RECEPTOR NEGATIVE (HCC): Primary | ICD-10-CM

## 2022-01-18 PROCEDURE — 99214 OFFICE O/P EST MOD 30 MIN: CPT | Performed by: INTERNAL MEDICINE

## 2022-01-18 ASSESSMENT — PATIENT HEALTH QUESTIONNAIRE - PHQ9
SUM OF ALL RESPONSES TO PHQ QUESTIONS 1-9: 0
1. LITTLE INTEREST OR PLEASURE IN DOING THINGS: 0
SUM OF ALL RESPONSES TO PHQ QUESTIONS 1-9: 0
SUM OF ALL RESPONSES TO PHQ QUESTIONS 1-9: 0
SUM OF ALL RESPONSES TO PHQ9 QUESTIONS 1 & 2: 0
SUM OF ALL RESPONSES TO PHQ QUESTIONS 1-9: 0
2. FEELING DOWN, DEPRESSED OR HOPELESS: 0

## 2022-01-18 NOTE — PROGRESS NOTES
MA Rooming Questions  Patient: Karolina Nichols  MRN: [id number]    Date: 1/18/2022        1. Do you have any new issues? yes - port was placed 01/10 and it is itchy          2. Do you need any refills on medications?    no    3. Have you had any imaging done since your last visit? yes - 01/06, pet ct 01/17    4. Have you been hospitalized or seen in the emergency room since your last visit here?   no    5. Did the patient have a depression screening completed today?  Yes    PHQ-9 Total Score: 0 (1/18/2022  2:04 PM)       PHQ-9 Given to (if applicable):               PHQ-9 Score (if applicable):                     [] Positive     []  Negative              Does question #9 need addressed (if applicable)                     [] Yes    []  No               Nya De La Torre CMA

## 2022-01-18 NOTE — PROGRESS NOTES
Patient Name:  Tamera Benitez  Patient :  1965  Patient MRN:  [id number]     Primary Oncologist: Mine Montgomery MD  Referring Provider: Libby Varghese PA-C     Date of Service: 2022      Reason for Consult:  TNBC     Chief Complaint:    Chief Complaint   Patient presents with    Follow-up       Encounter Diagnoses   Name Primary?  Malignant neoplasm of lower-inner quadrant of left breast in female, estrogen receptor negative ([address] Yes    Elevated liver enzymes     Lung nodules         HPI:   2021: She had with her  to the clinic today. Reported that she was found to have a mass in the left breast on routine mammogram.  Denied any axial lymphadenopathy. Denied any weight loss. Denied any new bone pains. Denied any focal weakness. Reported blurred vision, needs new glasses. Headaches lately. But reported that she recently quit drinking alcohol. Denied any chest pain or increase shortness of breath. Denied any abdominal pain, nausea vomiting, diarrhea, constipation    21: Mammogram:  Recommend spot gene synthesis compression views and possible ultrasound for   focal asymmetry in the left upper inner breast, middle depth.       BIRADS:   BIRADS - CATEGORY 0       Incomplete: Needs Additional Imaging Evaluation     11/10/21: Mammogram:  0.7 x 0.8 x 0.7 cm suspicious mass left breast 8 o'clock, about 3-4 cm from   the left nipple.  This likely correlates with the mammographic finding. Ultrasound-guided biopsy is advised.       Recommend post biopsy mammographic correlation to confirm that it correlates   with the mammographic finding.       These results and recommendations were discussed with the patient by Dr. Mita Sparks will be assisted to schedule the recommended biopsy by the breast   nurse navigator.  An order will be requested from her referring physician.       BIRADS:   BIRADS - CATEGORY 4B       Intermediate suspicion for malignancy.       Suspicious Abnormality. Social History:   Lives with a partner. Has 2 children 22 and 35, 2 daughters. Quit smoking about 25 years ago prior to which she smoked 1 pack/day for 7 years. Quit drinking alcohol on 12/2/2021 prior to which she consumed 4-5 beers per day and also whiskey sometimes. Currently on sick leave, was an  at [employer] Family History:    Father diagnosed with prostate cancer                                                                                               No Known Allergies    Current Outpatient Medications on File Prior to Visit   Medication Sig Dispense Refill    FLUoxetine (PROZAC) 20 MG capsule Take 20 mg by mouth daily      ibuprofen (ADVIL;MOTRIN) 600 MG tablet Take 600 mg by mouth every 6 hours as needed for Pain      ATORVASTATIN CALCIUM PO Take by mouth      lisinopril (PRINIVIL;ZESTRIL) 20 MG tablet TAKE ONE TABLET BY MOUTH DAILY 90 tablet 1    acyclovir (ZOVIRAX) 400 MG tablet Take 1 tablet by mouth daily 90 tablet 1    conjugated estrogens (PREMARIN) 0.625 MG/GM vaginal cream Place vaginally twice weekly 1 Tube 5     No current facility-administered medications on file prior to visit. Interval history:1/18/22: She arrived with her partner to the clinic today. Overall feels good. No chest pain, increased sob, fever, cough. No abdominal pain, nausea, emesis. No Constipation. No overt bleeding. Review of Systems:    Constitutional:  No weight loss, No fever, No chills, No night sweats. Energy level fair to poor  Eyes:  No diplopia, No transient or permanent loss of vision, No scotomata. ENT / Mouth:  No epistaxis, No dysphagia, No hoarseness, No oral ulcers, No gingival bleeding. No sore throat, No postnasal drip, No nasal drip, No mouth pain, No sinus pain, No tinnitus, Normal hearing.   Cardiovascular:  No chest pain, No palpitations, No syncope, No upper extremity edema, No lower extremity edema, No calf discomfort. Respiratory:  No cough. No hemoptysis, No pleurisy, No wheezing, No dyspnea. Breast:  No breast mass, No pain, No nipple discharge, No change in size, No change in shape. Gastrointestinal:  No abdominal pain, No abdominal cramping, No nausea, No vomiting, No constipation, No diarrhea, No hematochezia, No melena, No jaundice, No dyspepsia, No dysphagia. Urinary:  No dysuria, No hematuria, No urinary incontinence. Gynecological:  No vaginal discharge, No suprapubic pain, No abnormal vaginal bleeding. (Female Patients Only)  Musculoskeletal:  No muscle pain, No swollen joints, No joint redness, No bone pain, No spine tenderness. Skin:  No rash, No nodules, No pruritus, No lesions. Neurologic:  No confusion, No seizures, No syncope, No tremor, No speech change, No headache, No hiccups, No abnormal gait, No sensory changes, No weakness. Psychiatric:  No depression, No anxiety, Concentration normal.  Endocrine:  No polyuria, No polydipsia, No hot flashes, No thyroid symptoms. Hematologic:  No epistaxis, No gingival bleeding, No petechiae, No ecchymosis. Lymphatic:  No lymphadenopathy, No lymphedema. Allergy / Immunologic:  No eczema, No frequent mucous infections, No frequent respiratory infections, No recurrent urticarial, No frequent skin infections. Vital Signs: /81 (Site: Right Upper Arm, Position: Sitting, Cuff Size: Large Adult)   Pulse 86   Temp 97.8 °F (36.6 °C) (Temporal)   Ht 5' 1\" (1.549 m)   Wt 238 lb (108 kg)   SpO2 97%   BMI 44.97 kg/m²      CONSTITUTIONAL: awake, alert, obese, tired appearing  EYES: IGNACIO, No pallor or any icterus  ENT: ATNC  NECK: No JVD, port in the right side, slight edema and ttp. Mild erythema.    HEMATOLOGIC/LYMPHATIC: no cervical, supraclavicular or axillary lymphadenopathy   LUNGS: Occasional wheeze  CARDIOVASCULAR: s1s2 rrr no murmurs  ABDOMEN: soft ntnd bs pos  MUSCULOSKELETAL: full range of motion noted, tone is normal  NEUROLOGIC: GI  SKIN: No rash  EXTREMITIES: no LE edema bilaterally  Breast, left breast mass, did not initiate any lymphadenopathy.   No masses in the right breast or right lymphadenopathy       Labs:  Hematology:  Lab Results   Component Value Date    WBC 4.8 12/16/2021    RBC 3.94 (L) 12/16/2021    HGB 12.6 12/16/2021    HCT 39.6 12/16/2021    .5 (H) 12/16/2021    MCH 32.0 (H) 12/16/2021    MCHC 31.8 (L) 12/16/2021    RDW 11.6 (L) 12/16/2021     12/16/2021    MPV 10.2 12/16/2021    SEGSPCT 57.3 12/16/2021    EOSRELPCT 2.9 12/16/2021    BASOPCT 1.0 12/16/2021    LYMPHOPCT 28.3 12/16/2021    MONOPCT 9.2 (H) 12/16/2021    SEGSABS 2.8 12/16/2021    EOSABS 0.1 12/16/2021    BASOSABS 0.1 12/16/2021    LYMPHSABS 1.4 12/16/2021    MONOSABS 0.4 12/16/2021    DIFFTYPE AUTOMATED DIFFERENTIAL 12/16/2021     No results found for: ESR  Chemistry:  Lab Results   Component Value Date     12/16/2021    K 4.6 12/16/2021     12/16/2021    CO2 30 12/16/2021    BUN 11 12/16/2021    CREATININE 0.4 (L) 12/16/2021    GLUCOSE 100 (H) 12/16/2021    CALCIUM 9.4 12/16/2021    PROT 6.7 12/16/2021    LABALBU 4.6 12/16/2021    BILITOT 0.6 12/16/2021    ALKPHOS 155 (H) 12/16/2021    AST 37 12/16/2021    ALT 54 (H) 12/16/2021    LABGLOM >60 12/16/2021    GFRAA >60 12/16/2021    AGRATIO 1.7 10/11/2016    GLOB 2.4 10/11/2016     No results found for: MMA, LDH, HOMOCYSTEINE  No components found for: LD  Lab Results   Component Value Date    TSHHS 1.650 02/24/2021    T4FREE 1.04 02/24/2021    FT3 2.8 02/24/2021     Immunology:  Lab Results   Component Value Date    PROT 6.7 12/16/2021     No results found for: Gomez Masters, AMBERR  No results found for: B2M  Coagulation Panel:  No results found for: PROTIME, INR, APTT, DDIMER  Anemia Panel:  No results found for: [id number], FOLATE  Tumor Markers:  No results found for: , CEA, , LABCA2, PSA     Observations:  ECOG:  PHQ-9 Total Score: 0 (1/18/2022  2:04 PM)       Assessment & Plan:                                                          Triple negative breast cancer, left, 1.1 cm, grade 2, no evidence of any axial lymphadenopathy per available imaging studies. PET scan jan 2022 with no other met disease. Will obtain tumor markers. Discussed the findings, diagnosis, possible staging and also discussed neoadjuvant chemotherapy with AC followed by T followed by surgical resection followed by radiation pending surgical modality. Discussed the adverse effects of doxorubicin, cyclophosphamide and Taxol. Answered all questions. PORT completed and OCM pending. Echocardiogram jan 12 2022 with preserved EF. Dose adjustments according to adverse effects and cytopenias. Recommend growth factor support. Discussed genetic testing and counseling, consult placed. Will follow per NCCN guidelines after completion of all the treatments    Mild elevation of the liver enzymes, most probably secondary to alcohol. Continue to monitor. Recommend complete strict cessation of alcohol. Continue other medical care. Thank you for letting us be part of the care and will follow along. Discussed above findings and plan with her and she voiced understanding. Answered all her questions. Discussed healthy lifestyle including healthy diet, regular exercise as tolerated. Also discussed importance of being up-to-date with age-appropriate screening tools. Recommend follow-up with primary care physician and other specialists. Please do not hesitate to contact us if you need further information. Return to clinic feb 3 2022 or earlier if new Sx    I have recommended that the patient follow CDC guidelines for prevention of COVID-19 infection. Received COVID vaccine, booster not yet. No Flu vaccine either, never taken. Encouraged her.      [address]

## 2022-01-18 NOTE — PROGRESS NOTES
Patient Name: Casie Churchill    Patient : 1965     Patient MRN: [id number]       Date: 2022                                                                                                   CHEMOTHERAPY TREATMENT PLAN    Care Team  Medical Oncologist: Ana Escobedo MD  Surgeon: Dr. Eileen José Oncologist:   Primary Care Physician: Cayla Headley PA-C         Casie Churchill  1965  [id number]        Date: 2022    Learning Needs Assessment  Before the treatment plan was discussed and the consent was signed, the care team assessed the patient's learning needs. This includes their ability to assume responsibility for managing their therapy. Diagnosis      Malignant neoplasm of lower inner quadrant of breast ER negative    The Goal of My Therapy is    (  ) To cure my cancer  (x) To shrink the tumor prior to surgery  (  ) Increase my chance of cure after surgery  (  ) Help me live as long as possible with the highest quality of life. I know that a cure is not possible.      Prognosis    ( x ) Curable  (  ) Palliative    Plan Of Treatment    Intent:  (x  ) Neoadjuvant   (  ) Adjuvant   (  ) Control    Treatment Regimen  (including supportive meds)                             Frequency                                               Duration     A - Adriamycin® (doxorubicin)  C - Cyclophosphamide (Cytoxan®)   Every two weeks  With neulasta support  Followed by weekly taxol x 12    Expected Response to Treatment    ( x ) This therapy could cure your disease  (  ) This therapy has a good chance of helping you live 1 year or more compared to without it  (  ) This therapy has a good chance of helping you feel better or making you live months longer compared to without it     Quality Of Life    (  ) Expect that you will be able to continue all normal activities  (x  ) Expect that you will have some side effects but should be able to maintain normal activities  (  ) Expect that you will be unable to work but able to perform light duties at home  (  ) Expect that you will be able to perform light duties and will need assistance with self care    Treatment Benefits and Harms     1. Patient taught on all common and rare toxicities regarding their treatment, disease process and drug regimen. This includes the short and long-         term effects of therapy (including infertility risks when appropriate). This also includes drug-drug and drug-food interactions when appropriate. Patient was educated when to call [facility] with adverse effects and symptoms. 2.    Patient was taught safe handling and storage of medications in the home (when appropriate) and procedures for handling body sections and             waste in the home. 3.    Patient was taught on planned for missed doses and follow-up plans during treatment, including sol of provider visits and labs. 4.    Patient signed consent on treatment and drug information sheets were given. Alternative To Recommended Treatment    (  ) Palliative or Hospice  (  ) Second Opinion  ( x ) Other    Patient Care Management     Advance Care Planning completed: paperwork provided   ( x ) Yes   (  ) No   Pain Care Plan entered (as applicable):    ( x ) Yes   (  ) No   Comments:  PHQ-9 completed (Follow-up plan as applicable):   ( x ) Yes   (  ) No   Comments:  Distress needs addressed with the patient:    ( x ) Yes   (  ) No   Distress areas needing addressed further:     Patient was educated on the expectations and their responsibility to schedule their follow-up appointments. The patient was also educated that if they refuse to show-up to their appointment or refuse to schedule a follow-up appointment that it is their responsibility to call [facility] in a timely manner to schedule their next appointment. The patient was educated on [facility] policy and that the patient is ultimately responsible for monitoring their appointments and adhering to the schedule.     (x ) Yes   (   ) No    Estimated Out of Pocket Costs discussed per the patient per financial navigator. Patient Consented and taught per Nurse Navigator. .  Today, time spent with the patient discussing the intent and schedule of their treatment. The patient was given a copy of their treatment summary. Questions and concerns were addressed with the patient. Time spent with the patient face to face today was  60 minutes, counseling and coordination of care dominated more than 50% of this patient encounter. Kylie Steve presented for chemo education for Adriamycin, Cytoxan followed by Taxol. We discussed potential AE including, but not limited to: infusion reaction, myelosuppression, cardiotoxicity, neuropathy, nausea, vomiting, stomatitis, discolored urine/hemorrhagic cystitis, alopecia, hepatotoxicity, constipation, diarrhea, nail discoloration, development of secondary cancers, etc. She is asked to call our office for temperature 100.4 or above or with any uncontrolled/unexpected side effects. Dexamethasone, zofran and olanzapine per protocol. She verbalized understanding and is willing to proceed. Advance directive: paperwork provided    Counseling: declines    [facility]: referral placed. Echo: 1/12/22 EF 50-55%    Mediport: placed by Dr. Say Goyal 1/10/22    PET CT 1/17/22- no metastatic disease    BP (!) 144/77 (Site: Left Lower Arm, Position: Sitting, Cuff Size: Medium Adult)   Pulse 81   Temp 96.8 °F (36 °C) (Temporal)   Resp 18   Ht 5' 1\" (1.549 m)   Wt 242 lb (109.8 kg)   SpO2 96%   BMI 45.73 kg/m²     Physical Exam  Vitals reviewed. HENT:      Head: Normocephalic. Mouth/Throat:      Mouth: Mucous membranes are moist.   Eyes:      Extraocular Movements: Extraocular movements intact. Cardiovascular:      Rate and Rhythm: Normal rate and regular rhythm. Pulses: Normal pulses.    Pulmonary:      Effort: Pulmonary effort is normal.   Abdominal:      General: Bowel sounds are normal.      Palpations: Abdomen is soft. Musculoskeletal:         General: Normal range of motion. Skin:     General: Skin is warm. Neurological:      Mental Status: She is alert and oriented to person, place, and time. Psychiatric:         Mood and Affect: Mood normal.         Behavior: Behavior normal.         Thought Content:  Thought content normal.         Judgment: Judgment normal.

## 2022-01-19 ENCOUNTER — CLINICAL DOCUMENTATION (OUTPATIENT)
Dept: CASE MANAGEMENT | Age: 57
End: 2022-01-19

## 2022-01-19 ENCOUNTER — HOSPITAL ENCOUNTER (OUTPATIENT)
Dept: INFUSION THERAPY | Age: 57
Discharge: HOME | End: 2022-01-19
Payer: COMMERCIAL

## 2022-01-19 ENCOUNTER — OFFICE VISIT (OUTPATIENT)
Dept: ONCOLOGY | Age: 57
End: 2022-01-19
Payer: COMMERCIAL

## 2022-01-19 VITALS
HEIGHT: 61 IN | WEIGHT: 242 LBS | SYSTOLIC BLOOD PRESSURE: 144 MMHG | BODY MASS INDEX: 45.69 KG/M2 | OXYGEN SATURATION: 96 % | DIASTOLIC BLOOD PRESSURE: 77 MMHG | RESPIRATION RATE: 18 BRPM | TEMPERATURE: 96.8 F | HEART RATE: 81 BPM

## 2022-01-19 DIAGNOSIS — R91.8 LUNG NODULES: ICD-10-CM

## 2022-01-19 DIAGNOSIS — Z71.9 ENCOUNTER FOR EDUCATION: ICD-10-CM

## 2022-01-19 DIAGNOSIS — C50.312 MALIGNANT NEOPLASM OF LOWER-INNER QUADRANT OF LEFT BREAST IN FEMALE, ESTROGEN RECEPTOR NEGATIVE (HCC): Primary | ICD-10-CM

## 2022-01-19 DIAGNOSIS — C50.312 MALIGNANT NEOPLASM OF LOWER-INNER QUADRANT OF LEFT BREAST IN FEMALE, ESTROGEN RECEPTOR NEGATIVE (HCC): ICD-10-CM

## 2022-01-19 DIAGNOSIS — R74.8 ELEVATED LIVER ENZYMES: ICD-10-CM

## 2022-01-19 DIAGNOSIS — Z71.89 ADVANCE DIRECTIVE DISCUSSED WITH PATIENT: ICD-10-CM

## 2022-01-19 DIAGNOSIS — Z17.1 MALIGNANT NEOPLASM OF LOWER-INNER QUADRANT OF LEFT BREAST IN FEMALE, ESTROGEN RECEPTOR NEGATIVE (HCC): ICD-10-CM

## 2022-01-19 DIAGNOSIS — Z17.1 MALIGNANT NEOPLASM OF LOWER-INNER QUADRANT OF LEFT BREAST IN FEMALE, ESTROGEN RECEPTOR NEGATIVE (HCC): Primary | ICD-10-CM

## 2022-01-19 LAB
ALBUMIN SERPL-MCNC: 4.6 GM/DL (ref 3.4–5)
ALP BLD-CCNC: 143 IU/L (ref 40–128)
ALT SERPL-CCNC: 28 U/L (ref 10–40)
ANION GAP SERPL CALCULATED.3IONS-SCNC: 11 MMOL/L (ref 4–16)
AST SERPL-CCNC: 21 IU/L (ref 15–37)
BASOPHILS ABSOLUTE: 0 K/CU MM
BASOPHILS RELATIVE PERCENT: 0.4 % (ref 0–1)
BILIRUB SERPL-MCNC: 0.8 MG/DL (ref 0–1)
BUN BLDV-MCNC: 13 MG/DL (ref 6–23)
CALCIUM SERPL-MCNC: 9.5 MG/DL (ref 8.3–10.6)
CHLORIDE BLD-SCNC: 101 MMOL/L (ref 99–110)
CO2: 26 MMOL/L (ref 21–32)
CREAT SERPL-MCNC: 0.4 MG/DL (ref 0.6–1.1)
DIFFERENTIAL TYPE: ABNORMAL
EOSINOPHILS ABSOLUTE: 0.2 K/CU MM
EOSINOPHILS RELATIVE PERCENT: 2.7 % (ref 0–3)
GFR AFRICAN AMERICAN: >60 ML/MIN/1.73M2
GFR NON-AFRICAN AMERICAN: >60 ML/MIN/1.73M2
GLUCOSE BLD-MCNC: 102 MG/DL (ref 70–99)
HCT VFR BLD CALC: 37.2 % (ref 37–47)
HEMOGLOBIN: 12.1 GM/DL (ref 12.5–16)
LYMPHOCYTES ABSOLUTE: 1.5 K/CU MM
LYMPHOCYTES RELATIVE PERCENT: 26.9 % (ref 24–44)
MCH RBC QN AUTO: 32.2 PG (ref 27–31)
MCHC RBC AUTO-ENTMCNC: 32.5 % (ref 32–36)
MCV RBC AUTO: 98.9 FL (ref 78–100)
MONOCYTES ABSOLUTE: 0.4 K/CU MM
MONOCYTES RELATIVE PERCENT: 8 % (ref 0–4)
PDW BLD-RTO: 12.4 % (ref 11.7–14.9)
PLATELET # BLD: 201 K/CU MM (ref 140–440)
PMV BLD AUTO: 9.6 FL (ref 7.5–11.1)
POTASSIUM SERPL-SCNC: 4.4 MMOL/L (ref 3.5–5.1)
RBC # BLD: 3.76 M/CU MM (ref 4.2–5.4)
SEGMENTED NEUTROPHILS ABSOLUTE COUNT: 3.4 K/CU MM
SEGMENTED NEUTROPHILS RELATIVE PERCENT: 62 % (ref 36–66)
SODIUM BLD-SCNC: 138 MMOL/L (ref 135–145)
TOTAL PROTEIN: 7 GM/DL (ref 6.4–8.2)
WBC # BLD: 5.5 K/CU MM (ref 4–10.5)

## 2022-01-19 PROCEDURE — 85025 COMPLETE CBC W/AUTO DIFF WBC: CPT

## 2022-01-19 PROCEDURE — 80053 COMPREHEN METABOLIC PANEL: CPT

## 2022-01-19 PROCEDURE — 99215 OFFICE O/P EST HI 40 MIN: CPT | Performed by: NURSE PRACTITIONER

## 2022-01-19 PROCEDURE — 36415 COLL VENOUS BLD VENIPUNCTURE: CPT

## 2022-01-19 PROCEDURE — 86300 IMMUNOASSAY TUMOR CA 15-3: CPT

## 2022-01-19 RX ORDER — MEPERIDINE HYDROCHLORIDE 25 MG/ML
12.5 INJECTION INTRAMUSCULAR; INTRAVENOUS; SUBCUTANEOUS PRN
Status: CANCELLED | OUTPATIENT
Start: 2022-01-20

## 2022-01-19 RX ORDER — HYDROCODONE BITARTRATE AND ACETAMINOPHEN 5; 325 MG/1; MG/1
TABLET ORAL
COMMUNITY
Start: 2022-01-10 | End: 2022-02-17

## 2022-01-19 RX ORDER — ONDANSETRON 2 MG/ML
8 INJECTION INTRAMUSCULAR; INTRAVENOUS
Status: CANCELLED | OUTPATIENT
Start: 2022-01-20

## 2022-01-19 RX ORDER — EPINEPHRINE 1 MG/ML
0.3 INJECTION, SOLUTION, CONCENTRATE INTRAVENOUS PRN
Status: CANCELLED | OUTPATIENT
Start: 2022-01-20

## 2022-01-19 RX ORDER — SODIUM CHLORIDE 9 MG/ML
INJECTION, SOLUTION INTRAVENOUS CONTINUOUS
Status: CANCELLED | OUTPATIENT
Start: 2022-01-20

## 2022-01-19 RX ORDER — SODIUM CHLORIDE 9 MG/ML
25 INJECTION, SOLUTION INTRAVENOUS PRN
Status: CANCELLED | OUTPATIENT
Start: 2022-01-20

## 2022-01-19 RX ORDER — DEXAMETHASONE 4 MG/1
TABLET ORAL
Qty: 16 TABLET | Refills: 0 | Status: SHIPPED
Start: 2022-01-19 | End: 2022-03-03 | Stop reason: DRUGHIGH

## 2022-01-19 RX ORDER — ALBUTEROL SULFATE 90 UG/1
4 AEROSOL, METERED RESPIRATORY (INHALATION) PRN
Status: CANCELLED | OUTPATIENT
Start: 2022-01-20

## 2022-01-19 RX ORDER — DIPHENHYDRAMINE HYDROCHLORIDE 50 MG/ML
50 INJECTION INTRAMUSCULAR; INTRAVENOUS
Status: CANCELLED | OUTPATIENT
Start: 2022-01-20

## 2022-01-19 RX ORDER — ONDANSETRON HYDROCHLORIDE 8 MG/1
8 TABLET, FILM COATED ORAL EVERY 8 HOURS PRN
Qty: 30 TABLET | Refills: 1 | Status: SHIPPED | OUTPATIENT
Start: 2022-01-19 | End: 2022-07-07

## 2022-01-19 RX ORDER — HYDROXYZINE PAMOATE 25 MG/1
25 CAPSULE ORAL NIGHTLY
Qty: 30 CAPSULE | Refills: 1 | Status: SHIPPED | OUTPATIENT
Start: 2022-01-19 | End: 2022-02-17

## 2022-01-19 RX ORDER — OLANZAPINE 5 MG/1
TABLET ORAL
Qty: 16 TABLET | Refills: 0 | Status: SHIPPED | OUTPATIENT
Start: 2022-01-19 | End: 2022-07-07

## 2022-01-19 RX ORDER — SODIUM CHLORIDE 9 MG/ML
5-40 INJECTION INTRAVENOUS PRN
Status: CANCELLED | OUTPATIENT
Start: 2022-01-20

## 2022-01-19 RX ORDER — SODIUM CHLORIDE 0.9 % (FLUSH) 0.9 %
5-40 SYRINGE (ML) INJECTION PRN
Status: CANCELLED | OUTPATIENT
Start: 2022-01-20

## 2022-01-19 RX ORDER — ACETAMINOPHEN 325 MG/1
650 TABLET ORAL
Status: CANCELLED | OUTPATIENT
Start: 2022-01-20

## 2022-01-19 NOTE — PROGRESS NOTES
MA Rooming Questions  Patient: Worthy Bowels  MRN: [id number]    Date: 1/19/2022        Here for Bertha.     Kelly Gimenez CMA

## 2022-01-19 NOTE — PROGRESS NOTES
Patient arrived with spouse and daughter for treatment planning and chemotherapy education. Discussed treatment plan, potential side effects, prevention, and symptom management. Reviewed chemotherapy education folder and drug monograph. Patient's regimen will be AC with Neulasta every two weeks x4 cycles then Taxol weekly x12 cycles. Patient signed chemotherapy consent for Adriamycin, Cytoxan, Taxol and Neulasta On-pro (instructed patient to take Claritin daily for possible bone pain from Neulasta). Copy of consent given to patient. Reviewed chemotherapy schedule/calendar. Rx for Zofran, Olanzapine and Dexamethasone sent to pharmacy per Rolly Zhu, APRN-CNP. Patient given calendar and written instructions for these medications and how/when to take. Reviewed ECHO EF 50-55%. Mediport to right upper chest intact without redness or drainage. Script for cranial prosthesis with list of local boutiques given to patient. Patient given on-call phone number for after office hours and weekends. CBC and CMP drawn today. Confirmed first chemotherapy appointment for [facility] for tomorrow, 1/20/22 at 0930.

## 2022-01-20 ENCOUNTER — HOSPITAL ENCOUNTER (OUTPATIENT)
Dept: INFUSION THERAPY | Age: 57
Discharge: HOME | End: 2022-01-20
Payer: COMMERCIAL

## 2022-01-20 VITALS
TEMPERATURE: 97.9 F | HEART RATE: 103 BPM | SYSTOLIC BLOOD PRESSURE: 136 MMHG | BODY MASS INDEX: 45.12 KG/M2 | HEIGHT: 61 IN | WEIGHT: 239 LBS | OXYGEN SATURATION: 96 % | DIASTOLIC BLOOD PRESSURE: 90 MMHG | RESPIRATION RATE: 18 BRPM

## 2022-01-20 DIAGNOSIS — Z17.1 MALIGNANT NEOPLASM OF LOWER-INNER QUADRANT OF LEFT BREAST IN FEMALE, ESTROGEN RECEPTOR NEGATIVE (HCC): Primary | ICD-10-CM

## 2022-01-20 DIAGNOSIS — C50.312 MALIGNANT NEOPLASM OF LOWER-INNER QUADRANT OF LEFT BREAST IN FEMALE, ESTROGEN RECEPTOR NEGATIVE (HCC): Primary | ICD-10-CM

## 2022-01-20 PROCEDURE — 96375 TX/PRO/DX INJ NEW DRUG ADDON: CPT

## 2022-01-20 PROCEDURE — 2580000003 HC RX 258: Performed by: INTERNAL MEDICINE

## 2022-01-20 PROCEDURE — 96411 CHEMO IV PUSH ADDL DRUG: CPT

## 2022-01-20 PROCEDURE — 6360000002 HC RX W HCPCS: Performed by: INTERNAL MEDICINE

## 2022-01-20 PROCEDURE — 96367 TX/PROPH/DG ADDL SEQ IV INF: CPT

## 2022-01-20 PROCEDURE — 96413 CHEMO IV INFUSION 1 HR: CPT

## 2022-01-20 PROCEDURE — 96377 APPLICATON ON-BODY INJECTOR: CPT

## 2022-01-20 RX ORDER — HEPARIN SODIUM (PORCINE) LOCK FLUSH IV SOLN 100 UNIT/ML 100 UNIT/ML
500 SOLUTION INTRAVENOUS PRN
Status: DISCONTINUED | OUTPATIENT
Start: 2022-01-20 | End: 2022-01-21 | Stop reason: HOSPADM

## 2022-01-20 RX ORDER — DOXORUBICIN HYDROCHLORIDE 2 MG/ML
60 INJECTION, SOLUTION INTRAVENOUS ONCE
Status: COMPLETED | OUTPATIENT
Start: 2022-01-20 | End: 2022-01-20

## 2022-01-20 RX ORDER — SODIUM CHLORIDE 9 MG/ML
20 INJECTION, SOLUTION INTRAVENOUS ONCE
Status: DISCONTINUED | OUTPATIENT
Start: 2022-01-20 | End: 2022-01-21 | Stop reason: HOSPADM

## 2022-01-20 RX ORDER — PALONOSETRON 0.05 MG/ML
0.25 INJECTION, SOLUTION INTRAVENOUS ONCE
Status: COMPLETED | OUTPATIENT
Start: 2022-01-20 | End: 2022-01-20

## 2022-01-20 RX ADMIN — PEGFILGRASTIM 6 MG: KIT SUBCUTANEOUS at 12:01

## 2022-01-20 RX ADMIN — FOSAPREPITANT 150 MG: 150 INJECTION, POWDER, LYOPHILIZED, FOR SOLUTION INTRAVENOUS at 09:50

## 2022-01-20 RX ADMIN — CYCLOPHOSPHAMIDE 1280 MG: 1 INJECTION, POWDER, FOR SOLUTION INTRAVENOUS; ORAL at 11:24

## 2022-01-20 RX ADMIN — HEPARIN 500 UNITS: 100 SYRINGE at 12:01

## 2022-01-20 RX ADMIN — SODIUM CHLORIDE 20 ML/HR: 0.9 INJECTION, SOLUTION INTRAVENOUS at 09:52

## 2022-01-20 RX ADMIN — PALONOSETRON 0.25 MG: 0.25 INJECTION, SOLUTION INTRAVENOUS at 09:49

## 2022-01-20 RX ADMIN — DEXAMETHASONE SODIUM PHOSPHATE 12 MG: 4 INJECTION, SOLUTION INTRAMUSCULAR; INTRAVENOUS at 10:36

## 2022-01-20 RX ADMIN — DOXORUBICIN HYDROCHLORIDE 128 MG: 2 INJECTION, SOLUTION INTRAVENOUS at 10:59

## 2022-01-20 NOTE — PROGRESS NOTES
Ambulated to infusion area. Here to initiate chemo. Patient had treatment planning, labs and consent last week. Lab and Echo results verified. All questions answered. Discussed side effects management and prescriptions related to treatment. Chemo administered as ordered. Call light within reach. Instructed patient to notify nursing staff with any issues or concerns. Tolerated infusions without incident. Doxorubicin administered through free flowing IV NS. Positive blood return noted pre, during and post administration. Neulasta education provided. .On Body placed on right arm, activated. Instructed patient to notify office with any questions or unmanageable issues. AVS provided. Discharged in stable condition.

## 2022-01-22 LAB — CA 27.29: 29.6 U/ML

## 2022-01-26 ENCOUNTER — INITIAL CONSULT (OUTPATIENT)
Dept: ONCOLOGY | Age: 57
End: 2022-01-26
Payer: COMMERCIAL

## 2022-01-26 ENCOUNTER — CLINICAL DOCUMENTATION (OUTPATIENT)
Dept: RADIATION ONCOLOGY | Age: 57
End: 2022-01-26

## 2022-01-26 ENCOUNTER — HOSPITAL ENCOUNTER (OUTPATIENT)
Dept: INFUSION THERAPY | Age: 57
Discharge: HOME | End: 2022-01-26

## 2022-01-26 VITALS
BODY MASS INDEX: 44.93 KG/M2 | DIASTOLIC BLOOD PRESSURE: 85 MMHG | HEIGHT: 61 IN | HEART RATE: 94 BPM | OXYGEN SATURATION: 97 % | WEIGHT: 238 LBS | SYSTOLIC BLOOD PRESSURE: 145 MMHG | TEMPERATURE: 96.6 F

## 2022-01-26 DIAGNOSIS — C50.312 MALIGNANT NEOPLASM OF LOWER-INNER QUADRANT OF LEFT BREAST IN FEMALE, ESTROGEN RECEPTOR NEGATIVE (HCC): Primary | ICD-10-CM

## 2022-01-26 DIAGNOSIS — Z17.1 MALIGNANT NEOPLASM OF LOWER-INNER QUADRANT OF LEFT BREAST IN FEMALE, ESTROGEN RECEPTOR NEGATIVE (HCC): Primary | ICD-10-CM

## 2022-01-26 PROCEDURE — 99402 PREV MED CNSL INDIV APPRX 30: CPT | Performed by: INTERNAL MEDICINE

## 2022-01-26 NOTE — PROGRESS NOTES
MA Rooming Questions  Patient: Bethany Benson  MRN: [id number]    Date: 1/26/2022        NEW PATIENT   NOT SLEEPING   5. Did the patient have a depression screening completed today?  No    No data recorded     PHQ-9 Given to (if applicable):               PHQ-9 Score (if applicable):                     [] Positive     []  Negative              Does question #9 need addressed (if applicable)                     [] Yes    []  No               Diallo Sharma CMA

## 2022-01-26 NOTE — PROGRESS NOTES
Palliative Care Initial Assessment    Medical Oncology History:     mammogram 0.8 cm lesion left breast.  Biopsy invasive ductal carcinoma high-grade triple negative. PET scan 2022 1.1 cm mass left lower inner breast.  2022 started on dose dense Adriamycin and Cytoxan to be followed by Taxol in a neoadjuvant fashion  Other Medical Problems:    Hypertension, hyperlipidemia, depression. Previous total abdominal hysterectomy and bilateral salpingo-oophorectomy. Personal History     A. Life Time:    Grew up in [facility]. Finished high school. Got  for a short time to girls ages 24 and 35.  2 grandchildren ages 5 and 2. Quite close with her daughters who live around. Has a boyfriend for last 6 years. Works at [employer] for last 35 years different places. Previous history of smoking though was stopped sometime ago. History of excessive ethanol intake has stopped about 6 weeks ago. Has used recreational marijuana     B. Family:     Parents are  had 3 brothers and 3 sisters though not close with. Physical Symptoms:    Most of her issues revolve around concerns about the disease process, difficulty sleeping primarily having stopped alcohol and on top of that finding of aggressive malignancy for which she was to undergo aggressive therapy. Otherwise no specific systemic complaints specially bony pains or worsening of cardiac or pulmonary or GI or  related symptoms. Psychological and Cognitive Symptoms:   As described above extremely concerned about the disease process but denies any cognitive decline  Illness Understanding and Care Preferences:   Has fairly good understanding of the disease process and of course is extremely pleased with the wonderful care she has been receiving here. Existential and Spiritual:   Not a regular Nondenominational person.   She believes and just being a good human being and treating everyone with respect and kindness  Social and Economic Resources:   Has a fairly good support system around her primarily 2 of her daughters and coworkers and a good support for her  Care Coordination:   January 26, 2022 spent time with her reviewing her personal history as well as medical history. She of course is quite keen on pursuing her prescribed treatment. She does concerned about getting off of alcohol and finding this malignancy is making her quite anxious and difficult to sleep. She had tried recreational marijuana with some success and would prefer during this difficult time.   Otherwise she is vaccinated for Covid    Time spent 30 minutes    Chasity Carreon MD

## 2022-02-01 ENCOUNTER — CLINICAL DOCUMENTATION (OUTPATIENT)
Dept: RADIATION ONCOLOGY | Age: 57
End: 2022-02-01

## 2022-02-02 RX ORDER — EPINEPHRINE 1 MG/ML
0.3 INJECTION, SOLUTION, CONCENTRATE INTRAVENOUS PRN
Status: CANCELLED | OUTPATIENT
Start: 2022-02-03

## 2022-02-02 RX ORDER — SODIUM CHLORIDE 9 MG/ML
25 INJECTION, SOLUTION INTRAVENOUS PRN
Status: CANCELLED | OUTPATIENT
Start: 2022-02-03

## 2022-02-02 RX ORDER — DIPHENHYDRAMINE HYDROCHLORIDE 50 MG/ML
50 INJECTION INTRAMUSCULAR; INTRAVENOUS
Status: CANCELLED | OUTPATIENT
Start: 2022-02-03

## 2022-02-02 RX ORDER — SODIUM CHLORIDE 9 MG/ML
5-40 INJECTION INTRAVENOUS PRN
Status: CANCELLED | OUTPATIENT
Start: 2022-02-03

## 2022-02-02 RX ORDER — MEPERIDINE HYDROCHLORIDE 25 MG/ML
12.5 INJECTION INTRAMUSCULAR; INTRAVENOUS; SUBCUTANEOUS PRN
Status: CANCELLED | OUTPATIENT
Start: 2022-02-03

## 2022-02-02 RX ORDER — SODIUM CHLORIDE 0.9 % (FLUSH) 0.9 %
5-40 SYRINGE (ML) INJECTION PRN
Status: CANCELLED | OUTPATIENT
Start: 2022-02-03

## 2022-02-02 RX ORDER — ALBUTEROL SULFATE 90 UG/1
4 AEROSOL, METERED RESPIRATORY (INHALATION) PRN
Status: CANCELLED | OUTPATIENT
Start: 2022-02-03

## 2022-02-02 RX ORDER — SODIUM CHLORIDE 9 MG/ML
INJECTION, SOLUTION INTRAVENOUS CONTINUOUS
Status: CANCELLED | OUTPATIENT
Start: 2022-02-03

## 2022-02-02 RX ORDER — ACETAMINOPHEN 325 MG/1
650 TABLET ORAL
Status: CANCELLED | OUTPATIENT
Start: 2022-02-03

## 2022-02-03 ENCOUNTER — OFFICE VISIT (OUTPATIENT)
Dept: ONCOLOGY | Age: 57
End: 2022-02-03
Payer: COMMERCIAL

## 2022-02-03 ENCOUNTER — HOSPITAL ENCOUNTER (OUTPATIENT)
Dept: INFUSION THERAPY | Age: 57
Discharge: HOME | End: 2022-02-03
Payer: COMMERCIAL

## 2022-02-03 VITALS
OXYGEN SATURATION: 97 % | SYSTOLIC BLOOD PRESSURE: 137 MMHG | HEIGHT: 61 IN | WEIGHT: 240 LBS | DIASTOLIC BLOOD PRESSURE: 87 MMHG | HEART RATE: 101 BPM | RESPIRATION RATE: 16 BRPM | BODY MASS INDEX: 45.31 KG/M2 | TEMPERATURE: 97.3 F

## 2022-02-03 DIAGNOSIS — Z17.1 MALIGNANT NEOPLASM OF LOWER-INNER QUADRANT OF LEFT BREAST IN FEMALE, ESTROGEN RECEPTOR NEGATIVE (HCC): Primary | ICD-10-CM

## 2022-02-03 DIAGNOSIS — C50.312 MALIGNANT NEOPLASM OF LOWER-INNER QUADRANT OF LEFT BREAST IN FEMALE, ESTROGEN RECEPTOR NEGATIVE (HCC): Primary | ICD-10-CM

## 2022-02-03 DIAGNOSIS — R74.8 ELEVATED LIVER ENZYMES: ICD-10-CM

## 2022-02-03 DIAGNOSIS — R91.8 LUNG NODULES: ICD-10-CM

## 2022-02-03 LAB
ALBUMIN SERPL-MCNC: 3.7 GM/DL (ref 3.4–5)
ALP BLD-CCNC: 131 IU/L (ref 40–129)
ALT SERPL-CCNC: 32 U/L (ref 10–40)
ANION GAP SERPL CALCULATED.3IONS-SCNC: 10 MMOL/L (ref 4–16)
AST SERPL-CCNC: 19 IU/L (ref 15–37)
BASOPHILS ABSOLUTE: 0 K/CU MM
BASOPHILS RELATIVE PERCENT: 0.7 % (ref 0–1)
BILIRUB SERPL-MCNC: 0.3 MG/DL (ref 0–1)
BUN BLDV-MCNC: 13 MG/DL (ref 6–23)
CALCIUM SERPL-MCNC: 9 MG/DL (ref 8.3–10.6)
CHLORIDE BLD-SCNC: 105 MMOL/L (ref 99–110)
CO2: 26 MMOL/L (ref 21–32)
CREAT SERPL-MCNC: 0.4 MG/DL (ref 0.6–1.1)
DIFFERENTIAL TYPE: ABNORMAL
EOSINOPHILS ABSOLUTE: 0 K/CU MM
EOSINOPHILS RELATIVE PERCENT: 0.7 % (ref 0–3)
GFR AFRICAN AMERICAN: >60 ML/MIN/1.73M2
GFR NON-AFRICAN AMERICAN: >60 ML/MIN/1.73M2
GLUCOSE BLD-MCNC: 103 MG/DL (ref 70–99)
HCT VFR BLD CALC: 34.5 % (ref 37–47)
HEMOGLOBIN: 11 GM/DL (ref 12.5–16)
LYMPHOCYTES ABSOLUTE: 1 K/CU MM
LYMPHOCYTES RELATIVE PERCENT: 23.7 % (ref 24–44)
MCH RBC QN AUTO: 32 PG (ref 27–31)
MCHC RBC AUTO-ENTMCNC: 31.9 % (ref 32–36)
MCV RBC AUTO: 100.3 FL (ref 78–100)
MONOCYTES ABSOLUTE: 0.3 K/CU MM
MONOCYTES RELATIVE PERCENT: 7.4 % (ref 0–4)
PDW BLD-RTO: 13.1 % (ref 11.7–14.9)
PLATELET # BLD: 163 K/CU MM (ref 140–440)
PMV BLD AUTO: 9.3 FL (ref 7.5–11.1)
POTASSIUM SERPL-SCNC: 4 MMOL/L (ref 3.5–5.1)
RBC # BLD: 3.44 M/CU MM (ref 4.2–5.4)
SEGMENTED NEUTROPHILS ABSOLUTE COUNT: 2.9 K/CU MM
SEGMENTED NEUTROPHILS RELATIVE PERCENT: 67.5 % (ref 36–66)
SODIUM BLD-SCNC: 141 MMOL/L (ref 135–145)
TOTAL PROTEIN: 5.8 GM/DL (ref 6.4–8.2)
WBC # BLD: 4.3 K/CU MM (ref 4–10.5)

## 2022-02-03 PROCEDURE — 96409 CHEMO IV PUSH SNGL DRUG: CPT

## 2022-02-03 PROCEDURE — 2580000003 HC RX 258: Performed by: INTERNAL MEDICINE

## 2022-02-03 PROCEDURE — 96413 CHEMO IV INFUSION 1 HR: CPT

## 2022-02-03 PROCEDURE — 96375 TX/PRO/DX INJ NEW DRUG ADDON: CPT

## 2022-02-03 PROCEDURE — 6360000002 HC RX W HCPCS: Performed by: INTERNAL MEDICINE

## 2022-02-03 PROCEDURE — 6360000002 HC RX W HCPCS

## 2022-02-03 PROCEDURE — 99214 OFFICE O/P EST MOD 30 MIN: CPT | Performed by: INTERNAL MEDICINE

## 2022-02-03 PROCEDURE — 85025 COMPLETE CBC W/AUTO DIFF WBC: CPT

## 2022-02-03 PROCEDURE — 96367 TX/PROPH/DG ADDL SEQ IV INF: CPT

## 2022-02-03 PROCEDURE — 80053 COMPREHEN METABOLIC PANEL: CPT

## 2022-02-03 PROCEDURE — 96377 APPLICATON ON-BODY INJECTOR: CPT

## 2022-02-03 RX ORDER — PALONOSETRON 0.05 MG/ML
0.25 INJECTION, SOLUTION INTRAVENOUS ONCE
Status: COMPLETED | OUTPATIENT
Start: 2022-02-03 | End: 2022-02-03

## 2022-02-03 RX ORDER — ONDANSETRON 2 MG/ML
8 INJECTION INTRAMUSCULAR; INTRAVENOUS
Status: DISCONTINUED | OUTPATIENT
Start: 2022-02-03 | End: 2022-02-03

## 2022-02-03 RX ORDER — DOXORUBICIN HYDROCHLORIDE 2 MG/ML
60 INJECTION, SOLUTION INTRAVENOUS ONCE
Status: COMPLETED | OUTPATIENT
Start: 2022-02-03 | End: 2022-02-03

## 2022-02-03 RX ORDER — HEPARIN SODIUM (PORCINE) LOCK FLUSH IV SOLN 100 UNIT/ML 100 UNIT/ML
SOLUTION INTRAVENOUS
Status: COMPLETED
Start: 2022-02-03 | End: 2022-02-03

## 2022-02-03 RX ORDER — HEPARIN SODIUM (PORCINE) LOCK FLUSH IV SOLN 100 UNIT/ML 100 UNIT/ML
500 SOLUTION INTRAVENOUS PRN
Status: DISCONTINUED | OUTPATIENT
Start: 2022-02-03 | End: 2022-02-04 | Stop reason: HOSPADM

## 2022-02-03 RX ORDER — SODIUM CHLORIDE 9 MG/ML
20 INJECTION, SOLUTION INTRAVENOUS ONCE
Status: DISCONTINUED | OUTPATIENT
Start: 2022-02-03 | End: 2022-02-04 | Stop reason: HOSPADM

## 2022-02-03 RX ADMIN — CYCLOPHOSPHAMIDE 1280 MG: 1 INJECTION, POWDER, FOR SOLUTION INTRAVENOUS; ORAL at 10:49

## 2022-02-03 RX ADMIN — HEPARIN 500 UNITS: 100 SYRINGE at 11:25

## 2022-02-03 RX ADMIN — DEXAMETHASONE SODIUM PHOSPHATE 12 MG: 4 INJECTION, SOLUTION INTRA-ARTICULAR; INTRALESIONAL; INTRAMUSCULAR; INTRAVENOUS; SOFT TISSUE at 09:31

## 2022-02-03 RX ADMIN — DOXORUBICIN HYDROCHLORIDE 128 MG: 2 INJECTION, SOLUTION INTRAVENOUS at 10:37

## 2022-02-03 RX ADMIN — PALONOSETRON 0.25 MG: 0.25 INJECTION, SOLUTION INTRAVENOUS at 09:23

## 2022-02-03 RX ADMIN — PEGFILGRASTIM 6 MG: KIT SUBCUTANEOUS at 11:28

## 2022-02-03 RX ADMIN — SODIUM CHLORIDE 150 MG: 9 INJECTION, SOLUTION INTRAVENOUS at 10:04

## 2022-02-03 RX ADMIN — SODIUM CHLORIDE 20 ML/HR: 9 INJECTION, SOLUTION INTRAVENOUS at 09:25

## 2022-02-03 RX ADMIN — HEPARIN SODIUM (PORCINE) LOCK FLUSH IV SOLN 100 UNIT/ML 500 UNITS: 100 SOLUTION at 11:25

## 2022-02-03 NOTE — PROGRESS NOTES
Patient Name:  Casie Churchill  Patient :  1965  Patient MRN:  [id number]     Primary Oncologist: Ana Escobedo MD  Referring Provider: Cayla Headley PA-C     Date of Service: 2/3/2022      Reason for Consult:  TNBC     Chief Complaint:    No chief complaint on file. Encounter Diagnoses   Name Primary?  Malignant neoplasm of lower-inner quadrant of left breast in female, estrogen receptor negative ([facility] [address] Yes    Elevated liver enzymes     Lung nodules         HPI:   2021: She had with her  to the clinic today. Reported that she was found to have a mass in the left breast on routine mammogram.  Denied any axial lymphadenopathy. Denied any weight loss. Denied any new bone pains. Denied any focal weakness. Reported blurred vision, needs new glasses. Headaches lately. But reported that she recently quit drinking alcohol. Denied any chest pain or increase shortness of breath. Denied any abdominal pain, nausea vomiting, diarrhea, constipation    21: Mammogram:  Recommend spot gene synthesis compression views and possible ultrasound for   focal asymmetry in the left upper inner breast, middle depth.       BIRADS:   BIRADS - CATEGORY 0       Incomplete: Needs Additional Imaging Evaluation     11/10/21: Mammogram:  0.7 x 0.8 x 0.7 cm suspicious mass left breast 8 o'clock, about 3-4 cm from   the left nipple.  This likely correlates with the mammographic finding. Ultrasound-guided biopsy is advised.       Recommend post biopsy mammographic correlation to confirm that it correlates   with the mammographic finding.       These results and recommendations were discussed with the patient by Dr. Barbosa Em will be assisted to schedule the recommended biopsy by the breast   nurse navigator.  An order will be requested from her referring physician.       BIRADS:   BIRADS - CATEGORY 4B       Intermediate suspicion for malignancy.       Suspicious Abnormality.  Biopsy should be considered at this time. 12/10/21:Final Pathologic Diagnosis:   Breast, left, 8 o'clock, ultrasound guided needle core   biopsy:   -  INVASIVE DUCTAL CARCINOMA (see comment, see stains   report). BREAST INVASIVE CARCINOMA SUMMARY - CORE BIOPSY   Procedure:  Ultrasound guided needle core biopsy. Laterality and tumor site:  Left breast at 8 o'clock. Tumor size:  7 mm. in greatest dimension within biopsy   material present. Histologic type:  Invasive ductal carcinoma.     Histologic Grade (Jennifer): Grade 2,        -Tubular score 3, Nuclear score 3, Mitosis score 1 (7   /10 hpf). Ductal Carcinoma In Situ (DCIS):  Not present. Lobular Carcinoma In Situ (LCIS):  Not present.     Lympho-vascular invasion:  Not identified.     Microcalcifications:  Present.     Additional Findings:  None.     Ancillary studies: ER/PgR/Her2 results from the current   biopsy are as follows:   -ER by [facility]*: Negative (0% staining)   -PgR by IHC*: Negative (0% staining)   -Her2 by IHC*: Negative (Score 0)   -Her2 by FISH*:Negative   -Avg. #HER2 signals/nucleus: 1.8, Avg. #CEP17   signals/nucleus: 1.72, HER2/CEP17 ratio: 1.05      12/16/2021 CBC WBC of 4.8 hemoglobin of 12.6 hematocrit of 39.6 MCV of 100.5 and platelets of 153. Sodium of 138 potassium 4.6 BUN of 11 creatinine 0.4. LFTs with alk phos 155 ALT 54    12/23/2021 CT scan of the abdomen pelvis with IV contrast revealed couple of noncalcified micro nodules in the lower lobe of the right lung. 1/10/22: PORT placed    1/17/22: PET:  Approximate 1.1 cm mass of the left lower inner breast with minimal focal FDG   avidity which may correspond to the known primary.  There is no evidence of   metastatic disease.         Past Medical History:     Hyperlipidemia, hypertension, depression                                                           Past Surgery History:     Total abdominal hysterectomy, bilateral salpingo-oophorectomy Social History:   Lives with a partner. Has 2 children 22 and 35, 2 daughters. Quit smoking about 25 years ago prior to which she smoked 1 pack/day for 7 years. Quit drinking alcohol on 12/2/2021 prior to which she consumed 4-5 beers per day and also whiskey sometimes. Currently on sick leave, was an  at [employer] Family History:    Father diagnosed with prostate cancer                                                                                               No Known Allergies    Current Outpatient Medications on File Prior to Visit   Medication Sig Dispense Refill    HYDROcodone-acetaminophen (NORCO) 5-325 MG per tablet Q6H      OLANZapine (ZYPREXA) 5 MG tablet One tablet po HS for four nights starting the night before chemotherapy 16 tablet 0    ondansetron (ZOFRAN) 8 MG tablet Take 1 tablet by mouth every 8 hours as needed for Nausea or Vomiting 30 tablet 1    dexamethasone (DECADRON) 4 MG tablet 1 tablet PO daily for 4 days starting the day after chemotherapy.  16 tablet 0    hydrOXYzine (VISTARIL) 25 MG capsule Take 1 capsule by mouth at bedtime 30 capsule 1    FLUoxetine (PROZAC) 20 MG capsule Take 20 mg by mouth daily      ibuprofen (ADVIL;MOTRIN) 600 MG tablet Take 600 mg by mouth every 6 hours as needed for Pain      ATORVASTATIN CALCIUM PO Take by mouth      lisinopril (PRINIVIL;ZESTRIL) 20 MG tablet TAKE ONE TABLET BY MOUTH DAILY 90 tablet 1    acyclovir (ZOVIRAX) 400 MG tablet Take 1 tablet by mouth daily 90 tablet 1     Current Facility-Administered Medications on File Prior to Visit   Medication Dose Route Frequency Provider Last Rate Last Admin    0.9 % sodium chloride infusion  20 mL/hr IntraVENous Once Tristian Tomlinson MD 20 mL/hr at 02/03/22 0925 20 mL/hr at 02/03/22 0925    fosaprepitant (EMEND) 150 mg in sodium chloride 0.9 % 250 mL IVPB  150 mg IntraVENous Once Paco Austin MD        Dexamethasone Sodium Phosphate 12 mg in sodium chloride 0.9 % IVPB  12 mg IntraVENous Once Paco Austin  mL/hr at 02/03/22 0931 12 mg at 02/03/22 0931    DOXOrubicin HCl (ADRIAMYCIN) chemo syringe 128 mg  60 mg/m2 (Treatment Plan Recorded) IntraVENous Once Paco Austin MD        cyclophosphamide (CYTOXAN) 1,280 mg in sodium chloride 0.9 % 250 mL chemo IVPB  600 mg/m2 (Treatment Plan Recorded) IntraVENous Once Paco Austin MD        pegfilgrastim (NEULASTA) on-body injector 6 mg  6 mg SubCUTAneous Once Paco Austin MD        heparin flush 100 UNIT/ML injection 500 Units  500 Units IntraCATHeter PRN Paco Austin MD        heparin flush 100 UNIT/ML injection            Interval history:2/3/22: She arrived with her partner to the clinic today. Port was bruised with last treatment and bled for 6 hrs. Did recur. No other bleeding. Constipation, fibre drinks help. No fever. Itchy throat and cough mostly cough. Nose stuffiness. For one day she had chest pain, and also reflux food after eating spicy food. No  increased sob. No palpitations. No dizziness. No abdominal pain. No increased LE edema. No  sx. Vulvar irritation and erythema, topicals helped. Review of Systems:  As per internal history     Vital Signs: There were no vitals taken for this visit.      Telehealth       Labs:  Hematology:  Lab Results   Component Value Date    WBC 4.3 02/03/2022    RBC 3.44 (L) 02/03/2022    HGB 11.0 (L) 02/03/2022    HCT 34.5 (L) 02/03/2022    .3 (H) 02/03/2022    MCH 32.0 (H) 02/03/2022    MCHC 31.9 (L) 02/03/2022    RDW 13.1 02/03/2022     02/03/2022    MPV 9.3 02/03/2022    SEGSPCT 67.5 (H) 02/03/2022    EOSRELPCT 0.7 02/03/2022    BASOPCT 0.7 02/03/2022    LYMPHOPCT 23.7 (L) 02/03/2022    MONOPCT 7.4 (H) 02/03/2022    SEGSABS 2.9 02/03/2022    EOSABS 0.0 02/03/2022    BASOSABS 0.0 02/03/2022    LYMPHSABS 1.0 02/03/2022    MONOSABS 0.3 02/03/2022 DIFFTYPE AUTOMATED DIFFERENTIAL 02/03/2022     No results found for: ESR  Chemistry:  Lab Results   Component Value Date     01/19/2022    K 4.4 01/19/2022     01/19/2022    CO2 26 01/19/2022    BUN 13 01/19/2022    CREATININE 0.4 (L) 01/19/2022    GLUCOSE 102 (H) 01/19/2022    CALCIUM 9.5 01/19/2022    PROT 7.0 01/19/2022    LABALBU 4.6 01/19/2022    BILITOT 0.8 01/19/2022    ALKPHOS 143 (H) 01/19/2022    AST 21 01/19/2022    ALT 28 01/19/2022    LABGLOM >60 01/19/2022    GFRAA >60 01/19/2022    AGRATIO 1.7 10/11/2016    GLOB 2.4 10/11/2016     No results found for: MMA, LDH, HOMOCYSTEINE  No components found for: LD  Lab Results   Component Value Date    TSHHS 1.650 02/24/2021    T4FREE 1.04 02/24/2021    FT3 2.8 02/24/2021     Immunology:  Lab Results   Component Value Date    PROT 7.0 01/19/2022     No results found for: [place], KLFLCR  No results found for: B2M  Coagulation Panel:  No results found for: PROTIME, INR, APTT, DDIMER  Anemia Panel:  No results found for: TOQLIXBH80, FOLATE  Tumor Markers:  Lab Results   Component Value Date    LABCA2 29.6 01/19/2022        Observations:  ECOG:  No data recorded       Assessment & Plan:                                                          Triple negative breast cancer,biopsy on 12/1/2021,  left, 1.1 cm, grade 2, no evidence of any axillary  lymphadenopathy per available imaging studies. PET scan jan 2022 with no other met disease. Ca 27-29 was 29.6 in jan 2021 . Discussed the findings, diagnosis, possible staging and recommended neoadjuvant chemotherapy with AC followed by T followed by surgical resection followed by radiation pending surgical modality. Discussed the adverse effects of doxorubicin, cyclophosphamide and Taxol. Answered all questions. PORT completed and OCM completed. Echocardiogram jan 12 2022 with preserved EF. Dose adjustments according to adverse effects and cytopenias.   Recommend growth factor support. Discussed genetic testing and counseling, consult was placed, following up on that  Will follow per NCCN guidelines after completion of all the treatments    NC anemia; Most probably sec to chemotherapy. Will monitor for now. Mild elevation of the liver enzymes, was most probably secondary to alcohol. Alk PO4 remains mildly elevated. Recommend complete strict cessation of alcohol. Constipation: recommended stool softener and laxatives as needed. Continue other medical care. Thank you for letting us be part of the care and will follow along. Discussed above findings and plan with her and she voiced understanding. Answered all her questions. Discussed healthy lifestyle including healthy diet, regular exercise as tolerated. Also discussed importance of being up-to-date with age-appropriate screening tools. Recommend follow-up with primary care physician and other specialists. Please do not hesitate to contact us if you need further information. Return to clinic feb 17 2022 or earlier if new Sx    I have recommended that the patient follow CDC guidelines for prevention of COVID-19 infection. Received COVID vaccine, booster not yet. No Flu vaccine either, never taken. Encouraged her.      [address]

## 2022-02-03 NOTE — PROGRESS NOTES
Ambulated to infusion area, here today for chemotherapy. Patient has an telehealth visit with Dr. Maribel Jacinto today at [address]. Patient reports having increased back pain since starting chemo. Instructed patient to inform Dr. Maribel Jacinto during telehealth visit. Right chest mediport accessed, positive blood return noted. Labs drawn. Genetic testing kit completed. Labs within defined limits. Chemo administered as ordered. Adriamycin administered through freeflowing IV with blood return checked pre, during, and post administration. Call light within reach. Tolerated infusion without incident. Neulasta onbody placed on left arm. Discharge instructions provided. Patient RTC 02/08 for next infusion. Status appropriately assessed and documented. All required labs and results reviewed. Treatment approved by provider. Treatment orders and medications verified by 2 Registered Nurses where applicable. Treatment plan was confirmed with patient prior to administration, and educated the need to report any treatment-related symptoms      Prior to administration, when applicable, the following 8 elements of medication administration were reviewed with 2nd Registered Nurse prior to dosing: drug name, drug dose, infusion volume when prepared in a syringe, rate of administration, expiration dates and/or times, appearance and integrity of drug(s), and rate of pump for infusion. The 5 rights of medication administration have been verified.

## 2022-02-08 ENCOUNTER — HOSPITAL ENCOUNTER (OUTPATIENT)
Dept: INFUSION THERAPY | Age: 57
Discharge: HOME | End: 2022-02-08

## 2022-02-08 ENCOUNTER — OFFICE VISIT (OUTPATIENT)
Dept: ONCOLOGY | Age: 57
End: 2022-02-08
Payer: COMMERCIAL

## 2022-02-08 VITALS — BODY MASS INDEX: 46.82 KG/M2 | WEIGHT: 248 LBS | HEIGHT: 61 IN

## 2022-02-08 DIAGNOSIS — Z17.1 MALIGNANT NEOPLASM OF LOWER-INNER QUADRANT OF LEFT BREAST IN FEMALE, ESTROGEN RECEPTOR NEGATIVE (HCC): Primary | ICD-10-CM

## 2022-02-08 DIAGNOSIS — C50.312 MALIGNANT NEOPLASM OF LOWER-INNER QUADRANT OF LEFT BREAST IN FEMALE, ESTROGEN RECEPTOR NEGATIVE (HCC): Primary | ICD-10-CM

## 2022-02-08 DIAGNOSIS — Z71.83 ENCOUNTER FOR NONPROCREATIVE GENETIC COUNSELING: ICD-10-CM

## 2022-02-08 DIAGNOSIS — Z80.3 FAMILY HISTORY OF BREAST CANCER: ICD-10-CM

## 2022-02-08 PROCEDURE — 99215 OFFICE O/P EST HI 40 MIN: CPT | Performed by: NURSE PRACTITIONER

## 2022-02-08 NOTE — PROGRESS NOTES
GENETIC COUNSELING     2/8/22  [facility]  1965  49 y.o. Referred By:  Dr. Antony Cleary  Referred For: Initial genetic risk evaluation and testing    SUMMARY:  Tressa was referred for genetic counseling today due to personal and family history of cancer. She meets NCCN guidelines for genetic testing, so after informed consent, blood was drawn for the 36 gene panel through  Lab. Criteria for genetic testing due to triple negative breast cancer in any    Criteria for genetic testing with personal history of breast cancer and greater than 3 close relatives with breast cancer. 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 HER-2 negative by IHC and FISH, presently undergoing treatment with Adriamycin Cytoxan followed by Taxol    No previous genetic testing  No known mutation  Not adopted  Not a twin  No AJ heritage  No previous colonoscopy  One pack per day for 16 years Quit at 34  Alcohol occasionally  Menarche 11  Two pregnancies, two live births first child at 26  No abnormal pap  No HRT  OCP x 5 years  Has had TAHBSO        Surgical History:    Past Surgical History:   Procedure Laterality Date    HYSTERECTOMY, VAGINAL  12/02/2021    PORT SURGERY      US BREAST NEEDLE BIOPSY LEFT Left 12/10/2021    US BREAST NEEDLE BIOPSY LEFT 12/10/2021 Chris Suarez  [facility]        Medical Problems: Patient Active Problem List:     Essential hypertension     HSV-2 infection     Right hip pain     Chronic midline low back pain without sciatica     Malignant neoplasm of lower-inner quadrant of breast in female, estrogen receptor negative ([address]     Elevated liver enzymes     Lung nodules       Family History:  A three-generation pedigree was obtained today and will be saved with the patient's record.   Patient's family history is significant for the following:  See pedigree for information about family structure and unaffected relatives. Father- prostate  at 76  Mother-  of metastatic cancer, origin unknown  Three maternal aunt's with breast cancer    Unknown maternal health history    Risk Assessment:  Abe Jonathan Sergei 25 (NCCN) criteria for genetic testing based on personal and family history. Overall, there is a high risk of having a hereditary carncer syndrome. We discussed high risk syndromes such as HBOC, as well as other more moderate risk genetic mutations that could have contributed to the cancer in the family. Risk Model (if applicable):    Hereditary Breast and Ovarian Cancer  About 10-20% of breast and/or ovarian cancers are due to inherited mutations in cancer genes such as BRCA1 and BRCA2. Typical features of a family with HBOC include breast cancer younger than age 48, multiple generations affected, and bilateral or multiple cancers in the same person. For many gene mutations, there are national guidelines that have recommendations for medical management and/or preventative options. Cobb Syndrome  Cobb Syndrome is a genetic condition characterized by early onset colorectal cancer and endometrial cancer. It is associated with an elevated risk of other cancers such as gastric, ovarian, urinary tract, small aurea, brain and pancreatic cancer. Cobb syndrome is caused by a genetic change, or mutation, in one of five known genes:  MLH1, MSH2, EPCAM, MSH6, or PMS2. If a person has a mutation in one of these genes, they face increased cancer risks and also have a 50% chance of passing the mutation down to their children. There are national guidelines that have recommendations for medical management and/or preventative options.     Genetic Testing    Many laboratories are now using next generation sequencing to test a panel of cancer genes at the same time - this reduces the turnaround time and cost compared to the single gene approach to testing. In our discussion, we discussed three possible test results: positive, negative, and indeterminate, (variant of unknown significance). We addressed the 2008 federal legislation, HELENA, which protects individuals from discrimination in health insurance and employment. More information is available at www. GINAhelp.org. The pros and cons of panel testing were reviewed, including the fact that there are some genes that do not have well-defined cancer risks or recommendations. A psychological assessment was performed, and the patient understands how the results will be incorporated into medical management, as well as potential implications for family members. Consent: All elements of the informed consent were discussed with Elian Ruiz and signed consent was obtained for the gene panel. Medical decision making was of high complexity, as it included a comprehensive discussion of all relevant options for genetic testing and implications for patient and family members. The patient was educated that the lab will verify insurance coverage before initiating testing and call if there is out-of-pocket for any reason. Patient was educated that results are expected to be available in approximately 3 weeks and will then be contacted. Plan:  1. The patient opted to pursue the 36 gene panel through [facility] today. Will await results and schedule a future appointment in approximately 1 month as needed. 2. The patient was educated to continue routine follow-up with their healthcare providers. 3. Patient was educated that if they obtain any additional information regarding the family medical history, or if there are any changes to the reported personal or family health history, to please contact us for updated risk assessment.     60 minutes were spent with the patient, with over 50% of the time spent in face to face counseling and coordination of care.     Lilly Pratt, AWA - CNP    Recipients:

## 2022-02-15 ENCOUNTER — TELEPHONE (OUTPATIENT)
Dept: ONCOLOGY | Age: 57
End: 2022-02-15

## 2022-02-16 RX ORDER — MEPERIDINE HYDROCHLORIDE 25 MG/ML
12.5 INJECTION INTRAMUSCULAR; INTRAVENOUS; SUBCUTANEOUS PRN
Status: CANCELLED | OUTPATIENT
Start: 2022-02-17

## 2022-02-16 RX ORDER — EPINEPHRINE 1 MG/ML
0.3 INJECTION, SOLUTION, CONCENTRATE INTRAVENOUS PRN
Status: CANCELLED | OUTPATIENT
Start: 2022-02-17

## 2022-02-16 RX ORDER — DIPHENHYDRAMINE HYDROCHLORIDE 50 MG/ML
50 INJECTION INTRAMUSCULAR; INTRAVENOUS
Status: CANCELLED | OUTPATIENT
Start: 2022-02-17

## 2022-02-16 RX ORDER — ACETAMINOPHEN 325 MG/1
650 TABLET ORAL
Status: CANCELLED | OUTPATIENT
Start: 2022-02-17

## 2022-02-16 RX ORDER — SODIUM CHLORIDE 9 MG/ML
5-40 INJECTION INTRAVENOUS PRN
Status: CANCELLED | OUTPATIENT
Start: 2022-02-17

## 2022-02-16 RX ORDER — ALBUTEROL SULFATE 90 UG/1
4 AEROSOL, METERED RESPIRATORY (INHALATION) PRN
Status: CANCELLED | OUTPATIENT
Start: 2022-02-17

## 2022-02-16 RX ORDER — SODIUM CHLORIDE 9 MG/ML
25 INJECTION, SOLUTION INTRAVENOUS PRN
Status: CANCELLED | OUTPATIENT
Start: 2022-02-17

## 2022-02-16 RX ORDER — ONDANSETRON 2 MG/ML
8 INJECTION INTRAMUSCULAR; INTRAVENOUS
Status: CANCELLED | OUTPATIENT
Start: 2022-02-17

## 2022-02-16 RX ORDER — SODIUM CHLORIDE 9 MG/ML
INJECTION, SOLUTION INTRAVENOUS CONTINUOUS
Status: CANCELLED | OUTPATIENT
Start: 2022-02-17

## 2022-02-17 ENCOUNTER — OFFICE VISIT (OUTPATIENT)
Dept: ONCOLOGY | Age: 57
End: 2022-02-17
Payer: COMMERCIAL

## 2022-02-17 ENCOUNTER — HOSPITAL ENCOUNTER (OUTPATIENT)
Dept: INFUSION THERAPY | Age: 57
Discharge: HOME | End: 2022-02-17
Payer: COMMERCIAL

## 2022-02-17 VITALS
DIASTOLIC BLOOD PRESSURE: 67 MMHG | WEIGHT: 241 LBS | HEART RATE: 101 BPM | BODY MASS INDEX: 45.5 KG/M2 | TEMPERATURE: 98.3 F | OXYGEN SATURATION: 93 % | SYSTOLIC BLOOD PRESSURE: 115 MMHG | HEIGHT: 61 IN

## 2022-02-17 VITALS
BODY MASS INDEX: 45.5 KG/M2 | HEIGHT: 61 IN | WEIGHT: 241 LBS | HEART RATE: 101 BPM | SYSTOLIC BLOOD PRESSURE: 115 MMHG | OXYGEN SATURATION: 93 % | DIASTOLIC BLOOD PRESSURE: 67 MMHG | TEMPERATURE: 98.3 F

## 2022-02-17 DIAGNOSIS — Z17.1 MALIGNANT NEOPLASM OF LOWER-INNER QUADRANT OF LEFT BREAST IN FEMALE, ESTROGEN RECEPTOR NEGATIVE (HCC): Primary | ICD-10-CM

## 2022-02-17 DIAGNOSIS — R91.8 LUNG NODULES: ICD-10-CM

## 2022-02-17 DIAGNOSIS — C50.312 MALIGNANT NEOPLASM OF LOWER-INNER QUADRANT OF LEFT BREAST IN FEMALE, ESTROGEN RECEPTOR NEGATIVE (HCC): Primary | ICD-10-CM

## 2022-02-17 DIAGNOSIS — R74.8 ELEVATED LIVER ENZYMES: ICD-10-CM

## 2022-02-17 LAB
ALBUMIN SERPL-MCNC: 4.1 GM/DL (ref 3.4–5)
ALP BLD-CCNC: 138 IU/L (ref 40–128)
ALT SERPL-CCNC: 18 U/L (ref 10–40)
ANION GAP SERPL CALCULATED.3IONS-SCNC: 10 MMOL/L (ref 4–16)
AST SERPL-CCNC: 14 IU/L (ref 15–37)
BASOPHILS ABSOLUTE: 0.1 K/CU MM
BASOPHILS RELATIVE PERCENT: 0.7 % (ref 0–1)
BILIRUB SERPL-MCNC: 0.3 MG/DL (ref 0–1)
BUN BLDV-MCNC: 11 MG/DL (ref 6–23)
CALCIUM SERPL-MCNC: 9.2 MG/DL (ref 8.3–10.6)
CHLORIDE BLD-SCNC: 104 MMOL/L (ref 99–110)
CO2: 27 MMOL/L (ref 21–32)
CREAT SERPL-MCNC: 0.6 MG/DL (ref 0.6–1.1)
DIFFERENTIAL TYPE: ABNORMAL
EOSINOPHILS ABSOLUTE: 0 K/CU MM
EOSINOPHILS RELATIVE PERCENT: 0 % (ref 0–3)
GFR AFRICAN AMERICAN: >60 ML/MIN/1.73M2
GFR NON-AFRICAN AMERICAN: >60 ML/MIN/1.73M2
GLUCOSE BLD-MCNC: 105 MG/DL (ref 70–99)
HCT VFR BLD CALC: 31.4 % (ref 37–47)
HEMOGLOBIN: 10.2 GM/DL (ref 12.5–16)
LYMPHOCYTES ABSOLUTE: 1.1 K/CU MM
LYMPHOCYTES RELATIVE PERCENT: 15 % (ref 24–44)
MCH RBC QN AUTO: 32.2 PG (ref 27–31)
MCHC RBC AUTO-ENTMCNC: 32.5 % (ref 32–36)
MCV RBC AUTO: 99.1 FL (ref 78–100)
MONOCYTES ABSOLUTE: 0.5 K/CU MM
MONOCYTES RELATIVE PERCENT: 6.9 % (ref 0–4)
PDW BLD-RTO: 14.5 % (ref 11.7–14.9)
PLATELET # BLD: 180 K/CU MM (ref 140–440)
PMV BLD AUTO: 9.1 FL (ref 7.5–11.1)
POTASSIUM SERPL-SCNC: 4.1 MMOL/L (ref 3.5–5.1)
RBC # BLD: 3.17 M/CU MM (ref 4.2–5.4)
SEGMENTED NEUTROPHILS ABSOLUTE COUNT: 5.6 K/CU MM
SEGMENTED NEUTROPHILS RELATIVE PERCENT: 77.4 % (ref 36–66)
SODIUM BLD-SCNC: 141 MMOL/L (ref 135–145)
TOTAL PROTEIN: 6.6 GM/DL (ref 6.4–8.2)
WBC # BLD: 7.3 K/CU MM (ref 4–10.5)

## 2022-02-17 PROCEDURE — 85025 COMPLETE CBC W/AUTO DIFF WBC: CPT

## 2022-02-17 PROCEDURE — 6360000002 HC RX W HCPCS: Performed by: INTERNAL MEDICINE

## 2022-02-17 PROCEDURE — 2580000003 HC RX 258: Performed by: INTERNAL MEDICINE

## 2022-02-17 PROCEDURE — 99214 OFFICE O/P EST MOD 30 MIN: CPT | Performed by: INTERNAL MEDICINE

## 2022-02-17 PROCEDURE — 96375 TX/PRO/DX INJ NEW DRUG ADDON: CPT

## 2022-02-17 PROCEDURE — 96367 TX/PROPH/DG ADDL SEQ IV INF: CPT

## 2022-02-17 PROCEDURE — 36591 DRAW BLOOD OFF VENOUS DEVICE: CPT

## 2022-02-17 PROCEDURE — 99401 PREV MED CNSL INDIV APPRX 15: CPT | Performed by: INTERNAL MEDICINE

## 2022-02-17 PROCEDURE — 80053 COMPREHEN METABOLIC PANEL: CPT

## 2022-02-17 PROCEDURE — 96413 CHEMO IV INFUSION 1 HR: CPT

## 2022-02-17 PROCEDURE — 96417 CHEMO IV INFUS EACH ADDL SEQ: CPT

## 2022-02-17 PROCEDURE — 96409 CHEMO IV PUSH SNGL DRUG: CPT

## 2022-02-17 PROCEDURE — 96377 APPLICATON ON-BODY INJECTOR: CPT

## 2022-02-17 RX ORDER — SODIUM CHLORIDE 9 MG/ML
20 INJECTION, SOLUTION INTRAVENOUS ONCE
Status: DISCONTINUED | OUTPATIENT
Start: 2022-02-17 | End: 2022-02-18 | Stop reason: HOSPADM

## 2022-02-17 RX ORDER — PALONOSETRON 0.05 MG/ML
0.25 INJECTION, SOLUTION INTRAVENOUS ONCE
Status: COMPLETED | OUTPATIENT
Start: 2022-02-17 | End: 2022-02-17

## 2022-02-17 RX ORDER — DOXORUBICIN HYDROCHLORIDE 2 MG/ML
60 INJECTION, SOLUTION INTRAVENOUS ONCE
Status: COMPLETED | OUTPATIENT
Start: 2022-02-17 | End: 2022-02-17

## 2022-02-17 RX ORDER — BENZONATATE 200 MG/1
200 CAPSULE ORAL 3 TIMES DAILY PRN
Qty: 30 CAPSULE | Refills: 0 | Status: SHIPPED | OUTPATIENT
Start: 2022-02-17 | End: 2022-02-24

## 2022-02-17 RX ORDER — HEPARIN SODIUM (PORCINE) LOCK FLUSH IV SOLN 100 UNIT/ML 100 UNIT/ML
500 SOLUTION INTRAVENOUS PRN
Status: DISCONTINUED | OUTPATIENT
Start: 2022-02-17 | End: 2022-02-18 | Stop reason: HOSPADM

## 2022-02-17 RX ORDER — SODIUM CHLORIDE 0.9 % (FLUSH) 0.9 %
5-40 SYRINGE (ML) INJECTION PRN
Status: DISCONTINUED | OUTPATIENT
Start: 2022-02-17 | End: 2022-02-18 | Stop reason: HOSPADM

## 2022-02-17 RX ORDER — AZITHROMYCIN 250 MG/1
250 TABLET, FILM COATED ORAL SEE ADMIN INSTRUCTIONS
Qty: 6 TABLET | Refills: 0 | Status: SHIPPED | OUTPATIENT
Start: 2022-02-17 | End: 2022-02-22

## 2022-02-17 RX ADMIN — SODIUM CHLORIDE, PRESERVATIVE FREE 10 ML: 5 INJECTION INTRAVENOUS at 14:10

## 2022-02-17 RX ADMIN — CYCLOPHOSPHAMIDE 1280 MG: 1 INJECTION, POWDER, FOR SOLUTION INTRAVENOUS; ORAL at 13:32

## 2022-02-17 RX ADMIN — PALONOSETRON 0.25 MG: 0.25 INJECTION, SOLUTION INTRAVENOUS at 11:28

## 2022-02-17 RX ADMIN — DEXAMETHASONE SODIUM PHOSPHATE 12 MG: 4 INJECTION, SOLUTION INTRAMUSCULAR; INTRAVENOUS at 12:49

## 2022-02-17 RX ADMIN — DOXORUBICIN HYDROCHLORIDE 128 MG: 2 INJECTION, SOLUTION INTRAVENOUS at 13:13

## 2022-02-17 RX ADMIN — SODIUM CHLORIDE 20 ML/HR: 9 INJECTION, SOLUTION INTRAVENOUS at 11:27

## 2022-02-17 RX ADMIN — HEPARIN 500 UNITS: 100 SYRINGE at 14:10

## 2022-02-17 RX ADMIN — FOSAPREPITANT 150 MG: 150 INJECTION, POWDER, LYOPHILIZED, FOR SOLUTION INTRAVENOUS at 11:28

## 2022-02-17 RX ADMIN — PEGFILGRASTIM 6 MG: KIT SUBCUTANEOUS at 14:06

## 2022-02-17 NOTE — PROGRESS NOTES
Patient arrived to treatment suite for blood draw, pre-medications and chemotherapy infusion. Right chest mediport accessed and blood drawn from site and sent to lab after 2nd flush. Patient just saw the doctor prior to today's treatment. Treatment approved and given. Adriamycin given with free-flowing normal saline as push, blood return checked every minute of push and found to be present. Patient tolerated well. Left treatment suite ambulatory. Discharge instructions provided. Patient's status assessed and documented appropriately. All labs and required results were also reviewed today. Treatment parameters have been reviewed. Today's treatment has been approved by the provider. Treatment orders and medication sequencing (when applicable) was verified by 2 registered nurses. The treatment plan was confirmed with the patient prior to administration, and the patient understands the need to report any treatment-related symptoms. Prior to administration, when applicable, the following 8 elements of medication administration were reviewed with 2nd Registered Nurse prior to dosing: drug name, drug dose, infusion volume when prepared in a syringe, rate of administration, expiration dates and/or times, appearance and integrity of drug(s), and rate of pump for infusion. The 5 rights of medication administration have been verified.

## 2022-02-17 NOTE — PROGRESS NOTES
MA Rooming Questions  Patient: Karolina Nichols  MRN: [id number]    Date: 2/17/2022        1. Do you have any new issues? yes - cough         2. Do you need any refills on medications?    no    3. Have you had any imaging done since your last visit?   no    4. Have you been hospitalized or seen in the emergency room since your last visit here?   no    5. Did the patient have a depression screening completed today?  No    No data recorded     PHQ-9 Given to (if applicable):               PHQ-9 Score (if applicable):                     [] Positive     []  Negative              Does question #9 need addressed (if applicable)                     [] Yes    []  No               Derian Payne CMA

## 2022-02-17 NOTE — PROGRESS NOTES
Palliative Care  Assessment    Medical Oncology History:     mammogram 0.8 cm lesion left breast.  Biopsy invasive ductal carcinoma high-grade triple negative. PET scan 2022 1.1 cm mass left lower inner breast.  2022 started on dose dense Adriamycin and Cytoxan to be followed by Taxol in a neoadjuvant fashion  2022 still was being treated in neoadjuvant fashion, currently on Adriamycin and Cytoxan  Other Medical Problems:    Hypertension, hyperlipidemia, depression. Previous total abdominal hysterectomy and bilateral salpingo-oophorectomy. Personal History     A. Life Time:    Grew up in [facility]. Finished high school. Got  for a short time, two girls ages 22 and 35.  2 grandchildren ages 5 and 2. Quite close with her daughters who live around. Has a boyfriend for last 6 years. Works at [employer] for last 35 years different places. Previous history of smoking though was stopped sometime ago. History of excessive ethanol intake has stopped about 6 weeks ago. Has used recreational marijuana     B. Family:     Parents are  had 3 brothers and 3 sisters though not close with. Physical Symptoms:   2022 was undergoing chemotherapy treatments. She in general has tolerated these treatments fairly well with minimal degree of nausea, lack of appetite and/or weight loss. Besides antiemetics she has been using cannabis with significant help. She has been working 40 hours a week.   Denied any other specific new systemic complaints and all in all she was pleased with her symptoms management   Psychological and Cognitive Symptoms:   As described above extremely concerned about the disease process but denies any cognitive decline  Illness Understanding and Care Preferences:   Has fairly good understanding of the disease process and of course is extremely pleased with the wonderful care she has been receiving here.  Existential and Spiritual:   Not a regular [employer] person. She believes and just being a good human being and treating everyone with respect and kindness  Social and Economic Resources:   Has a fairly good support system around her primarily 2 of her daughters and coworkers and a good support for her  Care Coordination:   February 17, 2022 in general was tolerating neoadjuvant chemotherapy treatments fairly well most of her symptoms are fairly well managed with the help of antiemetics as well as cannabis. She still has been able to work 40 hours a week. All in all we were pleased with her symptoms management and encouraged her of course to continue on the treatment plan as prescribed.     Time spent 15 minutes      Ameena Webber MD

## 2022-02-17 NOTE — PROGRESS NOTES
Patient Name:  Karrie Castellanos  Patient :  1965  Patient MRN:  [id number]     Primary Oncologist: Abel Hines MD  Referring Provider: Aster Yoo PA-C     Date of Service: 2022      Reason for Consult:  TNBC     Chief Complaint:    Chief Complaint   Patient presents with    Follow-up       Encounter Diagnoses   Name Primary?  Malignant neoplasm of lower-inner quadrant of left breast in female, estrogen receptor negative ([address] Yes    Elevated liver enzymes     Lung nodules         HPI:   2021: She had with her  to the clinic today. Reported that she was found to have a mass in the left breast on routine mammogram.  Denied any axial lymphadenopathy. Denied any weight loss. Denied any new bone pains. Denied any focal weakness. Reported blurred vision, needs new glasses. Headaches lately. But reported that she recently quit drinking alcohol. Denied any chest pain or increase shortness of breath. Denied any abdominal pain, nausea vomiting, diarrhea, constipation    21: Mammogram:  Recommend spot gene synthesis compression views and possible ultrasound for   focal asymmetry in the left upper inner breast, middle depth.       BIRADS:   BIRADS - CATEGORY 0       Incomplete: Needs Additional Imaging Evaluation     11/10/21: Mammogram:  0.7 x 0.8 x 0.7 cm suspicious mass left breast 8 o'clock, about 3-4 cm from   the left nipple.  This likely correlates with the mammographic finding. Ultrasound-guided biopsy is advised.       Recommend post biopsy mammographic correlation to confirm that it correlates   with the mammographic finding.       These results and recommendations were discussed with the patient by Dr. Slick Kent will be assisted to schedule the recommended biopsy by the breast   nurse navigator.  An order will be requested from her referring physician.       BIRADS:   BIRADS - CATEGORY 4B       Intermediate suspicion for malignancy.       Suspicious Abnormality. salpingo-oophorectomy                                                                          Social History:   Lives with a partner. Has 2 children 22 and 35, 2 daughters. Quit smoking about 25 years ago prior to which she smoked 1 pack/day for 7 years. Quit drinking alcohol on 12/2/2021 prior to which she consumed 4-5 beers per day and also whiskey sometimes. Currently on sick leave, was an  at [employer] Family History:    Father diagnosed with prostate cancer                                                                                               No Known Allergies    Current Outpatient Medications on File Prior to Visit   Medication Sig Dispense Refill    Cholecalciferol (VITAMIN D3 PO) Take 1 tablet by mouth daily      Ginger, Zingiber officinalis, (GINGER PO) Take 1 tablet by mouth daily      OLANZapine (ZYPREXA) 5 MG tablet One tablet po HS for four nights starting the night before chemotherapy 16 tablet 0    ondansetron (ZOFRAN) 8 MG tablet Take 1 tablet by mouth every 8 hours as needed for Nausea or Vomiting 30 tablet 1    dexamethasone (DECADRON) 4 MG tablet 1 tablet PO daily for 4 days starting the day after chemotherapy. 16 tablet 0    FLUoxetine (PROZAC) 20 MG capsule Take 20 mg by mouth daily      ibuprofen (ADVIL;MOTRIN) 600 MG tablet Take 600 mg by mouth every 6 hours as needed for Pain      ATORVASTATIN CALCIUM PO Take by mouth      lisinopril (PRINIVIL;ZESTRIL) 20 MG tablet TAKE ONE TABLET BY MOUTH DAILY 90 tablet 1    acyclovir (ZOVIRAX) 400 MG tablet Take 1 tablet by mouth daily 90 tablet 1     No current facility-administered medications on file prior to visit. Interval history:2/17/22: She arrived alone to the clinic today. Reported sore throat, deep cough mostly dry and hoarseness of voice. No fever. No bleeding. Reported that she feels weak. No chest pain.  No new breast mass or axillary lad. Review of Systems:  As per internal history     Vital Signs: /67 (Site: Right Lower Arm, Position: Sitting, Cuff Size: Large Adult)   Pulse 101   Temp 98.3 °F (36.8 °C) (Infrared)   Ht 5' 1\" (1.549 m)   Wt 241 lb (109.3 kg)   SpO2 93%   BMI 45.54 kg/m²     CONSTITUTIONAL: awake, alert, obese, tired appearing  EYES: IGNACIO, No pallor or any icterus  ENT: ATNC  NECK: No JVD, port in the right side, slight edema and ttp. Mild erythema. HEMATOLOGIC/LYMPHATIC: no cervical, supraclavicular or axillary lymphadenopathy   LUNGS: CTAB  CARDIOVASCULAR: s1s2 rrr no murmurs  ABDOMEN: soft ntnd bs pos  MUSCULOSKELETAL: full range of motion noted, tone is normal  NEUROLOGIC: GI  SKIN: No rash  EXTREMITIES: no LE edema bilaterally  Breast, did not appreciate left breast mass, did not appreciate any axillary lymphadenopathy.   No masses in the right breast or right axillary lymphadenopathy     Labs:  Hematology:  Lab Results   Component Value Date    WBC 4.3 02/03/2022    RBC 3.44 (L) 02/03/2022    HGB 11.0 (L) 02/03/2022    HCT 34.5 (L) 02/03/2022    .3 (H) 02/03/2022    MCH 32.0 (H) 02/03/2022    MCHC 31.9 (L) 02/03/2022    RDW 13.1 02/03/2022     02/03/2022    MPV 9.3 02/03/2022    SEGSPCT 67.5 (H) 02/03/2022    EOSRELPCT 0.7 02/03/2022    BASOPCT 0.7 02/03/2022    LYMPHOPCT 23.7 (L) 02/03/2022    MONOPCT 7.4 (H) 02/03/2022    SEGSABS 2.9 02/03/2022    EOSABS 0.0 02/03/2022    BASOSABS 0.0 02/03/2022    LYMPHSABS 1.0 02/03/2022    MONOSABS 0.3 02/03/2022    DIFFTYPE AUTOMATED DIFFERENTIAL 02/03/2022     No results found for: ESR  Chemistry:  Lab Results   Component Value Date     02/03/2022    K 4.0 02/03/2022     02/03/2022    CO2 26 02/03/2022    BUN 13 02/03/2022    CREATININE 0.4 (L) 02/03/2022    GLUCOSE 103 (H) 02/03/2022    CALCIUM 9.0 02/03/2022    PROT 5.8 (L) 02/03/2022    LABALBU 3.7 02/03/2022    BILITOT 0.3 02/03/2022    ALKPHOS 131 (H) 02/03/2022    AST 19 02/03/2022    ALT 32 02/03/2022    LABGLOM >60 02/03/2022    GFRAA >60 02/03/2022    AGRATIO 1.7 10/11/2016    GLOB 2.4 10/11/2016     No results found for: MMA, LDH, HOMOCYSTEINE  No components found for: LD  Lab Results   Component Value Date    TSHHS 1.650 02/24/2021    T4FREE 1.04 02/24/2021    FT3 2.8 02/24/2021     Immunology:  Lab Results   Component Value Date    PROT 5.8 (L) 02/03/2022     No results found for: Naomy Mao, KLFLCR  No results found for: B2M  Coagulation Panel:  No results found for: PROTIME, INR, APTT, DDIMER  Anemia Panel:  No results found for: QRBQVFYC70, FOLATE  Tumor Markers:  Lab Results   Component Value Date    LABCA2 29.6 01/19/2022        Observations:  ECOG:  No data recorded       Assessment & Plan:                                                          Triple negative breast cancer,biopsy on 12/1/2021,  left, 1.1 cm, grade 2, no evidence of any axillary  lymphadenopathy per available imaging studies. PET scan jan 2022 with no other met disease. Ca 27-29 was 29.6 in jan 2021 . Discussed the findings, diagnosis, possible staging and recommended neoadjuvant chemotherapy with AC followed by T followed by surgical resection followed by radiation pending surgical modality(Note genetic testing negative). Discussed the adverse effects of doxorubicin, cyclophosphamide and Taxol. Answered all questions. PORT completed and OCM completed. Echocardiogram jan 12 2022 with preserved EF.   C1D1 started jan 20 2022. Dose adjustments according to adverse effects and cytopenias. Recommend growth factor support. Will follow per NCCN guidelines after completion of all the treatments    URI Sx/Cough: Prescribed empiric Z pack and tessalon pearls. NC anemia; Most probably sec to chemotherapy. Will monitor for now. Mild elevation of the liver enzymes, was most probably secondary to alcohol. Alk PO4 remains mildly elevated.   Recommend complete cessation of alcohol. Constipation: recommended stool softener and laxatives as needed. Continue other medical care. Thank you for letting us be part of the care and will follow along. Discussed above findings and plan with her and she voiced understanding. Answered all her questions. Discussed healthy lifestyle including healthy diet, regular exercise as tolerated. Also discussed importance of being up-to-date with age-appropriate screening tools. Recommend follow-up with primary care physician and other specialists. Please do not hesitate to contact us if you need further information. Return to clinic March 2022 or earlier if new Sx    I have recommended that the patient follow CDC guidelines for prevention of COVID-19 infection. Received COVID vaccine, booster not yet. No Flu vaccine either, never taken.      [address]

## 2022-02-17 NOTE — PROGRESS NOTES
MA Rooming Questions  Patient: Jacki Galvin  MRN: [id number]    Date: 2/17/2022        1. Do you have any new issues?   no         2. Do you need any refills on medications?    no    3. Have you had any imaging done since your last visit?   no    4. Have you been hospitalized or seen in the emergency room since your last visit here?   no    5. Did the patient have a depression screening completed today?  No    No data recorded     PHQ-9 Given to (if applicable):               PHQ-9 Score (if applicable):                     [] Positive     []  Negative              Does question #9 need addressed (if applicable)                     [] Yes    []  No               Cade Cruz CMA

## 2022-03-02 RX ORDER — ALBUTEROL SULFATE 90 UG/1
4 AEROSOL, METERED RESPIRATORY (INHALATION) PRN
Status: CANCELLED | OUTPATIENT
Start: 2022-03-03

## 2022-03-02 RX ORDER — EPINEPHRINE 1 MG/ML
0.3 INJECTION, SOLUTION, CONCENTRATE INTRAVENOUS PRN
Status: CANCELLED | OUTPATIENT
Start: 2022-03-03

## 2022-03-02 RX ORDER — ONDANSETRON 2 MG/ML
8 INJECTION INTRAMUSCULAR; INTRAVENOUS
Status: CANCELLED | OUTPATIENT
Start: 2022-03-03

## 2022-03-02 RX ORDER — MEPERIDINE HYDROCHLORIDE 25 MG/ML
12.5 INJECTION INTRAMUSCULAR; INTRAVENOUS; SUBCUTANEOUS PRN
Status: CANCELLED | OUTPATIENT
Start: 2022-03-03

## 2022-03-02 RX ORDER — SODIUM CHLORIDE 9 MG/ML
5-40 INJECTION INTRAVENOUS PRN
Status: CANCELLED | OUTPATIENT
Start: 2022-03-03

## 2022-03-02 RX ORDER — SODIUM CHLORIDE 9 MG/ML
INJECTION, SOLUTION INTRAVENOUS CONTINUOUS
Status: CANCELLED | OUTPATIENT
Start: 2022-03-03

## 2022-03-02 RX ORDER — ACETAMINOPHEN 325 MG/1
650 TABLET ORAL
Status: CANCELLED | OUTPATIENT
Start: 2022-03-03

## 2022-03-02 RX ORDER — DIPHENHYDRAMINE HYDROCHLORIDE 50 MG/ML
50 INJECTION INTRAMUSCULAR; INTRAVENOUS
Status: CANCELLED | OUTPATIENT
Start: 2022-03-03

## 2022-03-02 RX ORDER — SODIUM CHLORIDE 9 MG/ML
25 INJECTION, SOLUTION INTRAVENOUS PRN
Status: CANCELLED | OUTPATIENT
Start: 2022-03-03

## 2022-03-03 ENCOUNTER — HOSPITAL ENCOUNTER (OUTPATIENT)
Dept: INFUSION THERAPY | Age: 57
Discharge: HOME | End: 2022-03-03
Payer: COMMERCIAL

## 2022-03-03 ENCOUNTER — OFFICE VISIT (OUTPATIENT)
Dept: ONCOLOGY | Age: 57
End: 2022-03-03
Payer: COMMERCIAL

## 2022-03-03 VITALS
HEIGHT: 61 IN | SYSTOLIC BLOOD PRESSURE: 128 MMHG | BODY MASS INDEX: 46.44 KG/M2 | WEIGHT: 246 LBS | OXYGEN SATURATION: 97 % | DIASTOLIC BLOOD PRESSURE: 60 MMHG | HEART RATE: 91 BPM | TEMPERATURE: 96.3 F

## 2022-03-03 DIAGNOSIS — R91.8 LUNG NODULES: ICD-10-CM

## 2022-03-03 DIAGNOSIS — C50.312 MALIGNANT NEOPLASM OF LOWER-INNER QUADRANT OF LEFT BREAST IN FEMALE, ESTROGEN RECEPTOR NEGATIVE (HCC): Primary | ICD-10-CM

## 2022-03-03 DIAGNOSIS — R74.8 ELEVATED LIVER ENZYMES: ICD-10-CM

## 2022-03-03 DIAGNOSIS — Z17.1 MALIGNANT NEOPLASM OF LOWER-INNER QUADRANT OF LEFT BREAST IN FEMALE, ESTROGEN RECEPTOR NEGATIVE (HCC): Primary | ICD-10-CM

## 2022-03-03 LAB
ALBUMIN SERPL-MCNC: 3.8 GM/DL (ref 3.4–5)
ALP BLD-CCNC: 117 IU/L (ref 40–129)
ALT SERPL-CCNC: 14 U/L (ref 10–40)
ANION GAP SERPL CALCULATED.3IONS-SCNC: 11 MMOL/L (ref 4–16)
AST SERPL-CCNC: 14 IU/L (ref 15–37)
BASOPHILS ABSOLUTE: 0 K/CU MM
BASOPHILS RELATIVE PERCENT: 0.6 % (ref 0–1)
BILIRUB SERPL-MCNC: 0.4 MG/DL (ref 0–1)
BUN BLDV-MCNC: 13 MG/DL (ref 6–23)
CALCIUM SERPL-MCNC: 8.8 MG/DL (ref 8.3–10.6)
CHLORIDE BLD-SCNC: 105 MMOL/L (ref 99–110)
CO2: 27 MMOL/L (ref 21–32)
CREAT SERPL-MCNC: 0.6 MG/DL (ref 0.6–1.1)
DIFFERENTIAL TYPE: ABNORMAL
EOSINOPHILS ABSOLUTE: 0 K/CU MM
EOSINOPHILS RELATIVE PERCENT: 0.2 % (ref 0–3)
FERRITIN: 97 NG/ML (ref 15–150)
GFR AFRICAN AMERICAN: >60 ML/MIN/1.73M2
GFR NON-AFRICAN AMERICAN: >60 ML/MIN/1.73M2
GLUCOSE BLD-MCNC: 94 MG/DL (ref 70–99)
HCT VFR BLD CALC: 31.6 % (ref 37–47)
HEMOGLOBIN: 10 GM/DL (ref 12.5–16)
LYMPHOCYTES ABSOLUTE: 0.9 K/CU MM
LYMPHOCYTES RELATIVE PERCENT: 14 % (ref 24–44)
MCH RBC QN AUTO: 32.3 PG (ref 27–31)
MCHC RBC AUTO-ENTMCNC: 31.6 % (ref 32–36)
MCV RBC AUTO: 101.9 FL (ref 78–100)
MONOCYTES ABSOLUTE: 0.6 K/CU MM
MONOCYTES RELATIVE PERCENT: 9.3 % (ref 0–4)
PDW BLD-RTO: 17.1 % (ref 11.7–14.9)
PLATELET # BLD: 166 K/CU MM (ref 140–440)
PMV BLD AUTO: 9.5 FL (ref 7.5–11.1)
POTASSIUM SERPL-SCNC: 4.2 MMOL/L (ref 3.5–5.1)
RBC # BLD: 3.1 M/CU MM (ref 4.2–5.4)
SEGMENTED NEUTROPHILS ABSOLUTE COUNT: 5 K/CU MM
SEGMENTED NEUTROPHILS RELATIVE PERCENT: 75.9 % (ref 36–66)
SODIUM BLD-SCNC: 143 MMOL/L (ref 135–145)
TOTAL PROTEIN: 5.7 GM/DL (ref 6.4–8.2)
WBC # BLD: 6.6 K/CU MM (ref 4–10.5)

## 2022-03-03 PROCEDURE — 96417 CHEMO IV INFUS EACH ADDL SEQ: CPT

## 2022-03-03 PROCEDURE — 6360000002 HC RX W HCPCS: Performed by: INTERNAL MEDICINE

## 2022-03-03 PROCEDURE — 96415 CHEMO IV INFUSION ADDL HR: CPT

## 2022-03-03 PROCEDURE — 80053 COMPREHEN METABOLIC PANEL: CPT

## 2022-03-03 PROCEDURE — 2580000003 HC RX 258: Performed by: INTERNAL MEDICINE

## 2022-03-03 PROCEDURE — 85025 COMPLETE CBC W/AUTO DIFF WBC: CPT

## 2022-03-03 PROCEDURE — 96375 TX/PRO/DX INJ NEW DRUG ADDON: CPT

## 2022-03-03 PROCEDURE — 96377 APPLICATON ON-BODY INJECTOR: CPT

## 2022-03-03 PROCEDURE — 96367 TX/PROPH/DG ADDL SEQ IV INF: CPT

## 2022-03-03 PROCEDURE — 99214 OFFICE O/P EST MOD 30 MIN: CPT | Performed by: INTERNAL MEDICINE

## 2022-03-03 PROCEDURE — 82728 ASSAY OF FERRITIN: CPT

## 2022-03-03 PROCEDURE — 96411 CHEMO IV PUSH ADDL DRUG: CPT

## 2022-03-03 PROCEDURE — 96413 CHEMO IV INFUSION 1 HR: CPT

## 2022-03-03 RX ORDER — PALONOSETRON 0.05 MG/ML
0.25 INJECTION, SOLUTION INTRAVENOUS ONCE
Status: COMPLETED | OUTPATIENT
Start: 2022-03-03 | End: 2022-03-03

## 2022-03-03 RX ORDER — SODIUM CHLORIDE 9 MG/ML
20 INJECTION, SOLUTION INTRAVENOUS ONCE
Status: COMPLETED | OUTPATIENT
Start: 2022-03-03 | End: 2022-03-03

## 2022-03-03 RX ORDER — DOXORUBICIN HYDROCHLORIDE 2 MG/ML
60 INJECTION, SOLUTION INTRAVENOUS ONCE
Status: COMPLETED | OUTPATIENT
Start: 2022-03-03 | End: 2022-03-03

## 2022-03-03 RX ORDER — DEXAMETHASONE 2 MG/1
2 TABLET ORAL
Qty: 28 TABLET | Refills: 0 | Status: SHIPPED | OUTPATIENT
Start: 2022-03-03 | End: 2022-07-07

## 2022-03-03 RX ORDER — SODIUM CHLORIDE 0.9 % (FLUSH) 0.9 %
5-40 SYRINGE (ML) INJECTION PRN
Status: DISCONTINUED | OUTPATIENT
Start: 2022-03-03 | End: 2022-03-04 | Stop reason: HOSPADM

## 2022-03-03 RX ORDER — HEPARIN SODIUM (PORCINE) LOCK FLUSH IV SOLN 100 UNIT/ML 100 UNIT/ML
500 SOLUTION INTRAVENOUS PRN
Status: DISCONTINUED | OUTPATIENT
Start: 2022-03-03 | End: 2022-03-04 | Stop reason: HOSPADM

## 2022-03-03 RX ADMIN — PALONOSETRON 0.25 MG: 0.25 INJECTION, SOLUTION INTRAVENOUS at 11:22

## 2022-03-03 RX ADMIN — CYCLOPHOSPHAMIDE 1280 MG: 1 INJECTION, POWDER, FOR SOLUTION INTRAVENOUS; ORAL at 12:40

## 2022-03-03 RX ADMIN — HEPARIN 500 UNITS: 100 SYRINGE at 13:58

## 2022-03-03 RX ADMIN — DEXAMETHASONE SODIUM PHOSPHATE 12 MG: 4 INJECTION, SOLUTION INTRAMUSCULAR; INTRAVENOUS at 11:29

## 2022-03-03 RX ADMIN — DOXORUBICIN HYDROCHLORIDE 128 MG: 2 INJECTION, SOLUTION INTRAVENOUS at 13:33

## 2022-03-03 RX ADMIN — FOSAPREPITANT 150 MG: 150 INJECTION, POWDER, LYOPHILIZED, FOR SOLUTION INTRAVENOUS at 11:58

## 2022-03-03 RX ADMIN — SODIUM CHLORIDE 20 ML/HR: 9 INJECTION, SOLUTION INTRAVENOUS at 11:25

## 2022-03-03 RX ADMIN — SODIUM CHLORIDE, PRESERVATIVE FREE 20 ML: 5 INJECTION INTRAVENOUS at 13:58

## 2022-03-03 RX ADMIN — PEGFILGRASTIM 6 MG: KIT SUBCUTANEOUS at 13:55

## 2022-03-03 NOTE — PROGRESS NOTES
Ambulated to infusion area,after office visit, for chemotherapy. Right chest mediport accessed, positive blood return noted. Labs drawn and sent to lab for processing. Chemo administered as ordered. Adriamycin administered through freeflowing IV with blood return checked pre, during, and post administration. Call light within reach. Tolerated infusion without incident. Neulasta onbody placed on right arm. Discharge instructions provided.      Status appropriately assessed and documented. All required labs and results reviewed. Treatment approved by provider. Treatment orders and medications verified by 2 Registered Nurses where applicable. Treatment plan was confirmed with patient prior to administration, and educated the need to report any treatment-related symptoms        Prior to administration, when applicable, the following 8 elements of medication administration were reviewed with 2nd Registered Nurse prior to dosing: drug name, drug dose, infusion volume when prepared in a syringe, rate of administration, expiration dates and/or times, appearance and integrity of drug(s), and rate of pump for infusion.  The 5 rights of medication administration have been verified.

## 2022-03-03 NOTE — PROGRESS NOTES
MA Rooming Questions  Patient: Kalpana Norton  MRN: [id number]    Date: 3/3/2022        1. Do you have any new issues?   no         2. Do you need any refills on medications?    no    3. Have you had any imaging done since your last visit?   no    4. Have you been hospitalized or seen in the emergency room since your last visit here?   no    5. Did the patient have a depression screening completed today?  No    No data recorded     PHQ-9 Given to (if applicable):               PHQ-9 Score (if applicable):                     [] Positive     []  Negative              Does question #9 need addressed (if applicable)                     [] Yes    []  No               Aditya Salvador MA

## 2022-03-03 NOTE — PROGRESS NOTES
Patient Name:  Joann Liz  Patient :  1965  Patient MRN:  [id number]     Primary Oncologist: Ellen Deal MD  Referring Provider: García Arellano PA-C     Date of Service: 3/3/2022      Reason for Consult:  TNBC     Chief Complaint:    Chief Complaint   Patient presents with    Follow-up       Encounter Diagnoses   Name Primary?  Malignant neoplasm of lower-inner quadrant of left breast in female, estrogen receptor negative (HCC) Yes    Lung nodules     Elevated liver enzymes         HPI:   2021: She had with her  to the clinic today. Reported that she was found to have a mass in the left breast on routine mammogram.  Denied any axial lymphadenopathy. Denied any weight loss. Denied any new bone pains. Denied any focal weakness. Reported blurred vision, needs new glasses. Headaches lately. But reported that she recently quit drinking alcohol. Denied any chest pain or increase shortness of breath. Denied any abdominal pain, nausea vomiting, diarrhea, constipation    21: Mammogram:  Recommend spot gene synthesis compression views and possible ultrasound for   focal asymmetry in the left upper inner breast, middle depth.       BIRADS:   BIRADS - CATEGORY 0       Incomplete: Needs Additional Imaging Evaluation     11/10/21: Mammogram:  0.7 x 0.8 x 0.7 cm suspicious mass left breast 8 o'clock, about 3-4 cm from   the left nipple.  This likely correlates with the mammographic finding. Ultrasound-guided biopsy is advised.       Recommend post biopsy mammographic correlation to confirm that it correlates   with the mammographic finding.       These results and recommendations were discussed with the patient by Dr. Radha Lau will be assisted to schedule the recommended biopsy by the breast   nurse navigator.  An order will be requested from her referring physician.       BIRADS:   BIRADS - CATEGORY 4B       Intermediate suspicion for malignancy.       Suspicious Abnormality. Biopsy should be considered at this time. 12/10/21:Final Pathologic Diagnosis:   Breast, left, 8 o'clock, ultrasound guided needle core   biopsy:   -  INVASIVE DUCTAL CARCINOMA (see comment, see stains   report). BREAST INVASIVE CARCINOMA SUMMARY - CORE BIOPSY   Procedure:  Ultrasound guided needle core biopsy. Laterality and tumor site:  Left breast at 8 o'clock. Tumor size:  7 mm. in greatest dimension within biopsy   material present. Histologic type:  Invasive ductal carcinoma.     Histologic Grade ([place]): Grade 2,        -Tubular score 3, Nuclear score 3, Mitosis score 1 (7   /10 hpf). Ductal Carcinoma In Situ (DCIS):  Not present. Lobular Carcinoma In Situ (LCIS):  Not present.     Lympho-vascular invasion:  Not identified.     Microcalcifications:  Present.     Additional Findings:  None.     Ancillary studies: ER/PgR/Her2 results from the current   biopsy are as follows:   -ER by [facility]*: Negative (0% staining)   -PgR by IHC*: Negative (0% staining)   -Her2 by IHC*: Negative (Score 0)   -Her2 by FISH*:Negative   -Avg. #HER2 signals/nucleus: 1.8, Avg. #CEP17   signals/nucleus: 1.72, HER2/CEP17 ratio: 1.05      12/16/2021 CBC WBC of 4.8 hemoglobin of 12.6 hematocrit of 39.6 MCV of 100.5 and platelets of 090. Sodium of 138 potassium 4.6 BUN of 11 creatinine 0.4. LFTs with alk phos 155 ALT 54    12/23/2021 CT scan of the abdomen pelvis with IV contrast revealed couple of noncalcified micro nodules in the lower lobe of the right lung. 1/10/22: PORT placed    1/17/22: PET:  Approximate 1.1 cm mass of the left lower inner breast with minimal focal FDG   avidity which may correspond to the known primary.  There is no evidence of   metastatic disease.         C1D1 neoadjuvant AC started 1/20/22    Past Medical History:     Hyperlipidemia, hypertension, depression                                                           Past Surgery History:     Total abdominal hysterectomy, bilateral salpingo-oophorectomy                                                                          Social History:   Lives with a partner. Has 2 children 22 and 35, 2 daughters. Quit smoking about 25 years ago prior to which she smoked 1 pack/day for 7 years. Quit drinking alcohol on 12/2/2021 prior to which she consumed 4-5 beers per day and also whiskey sometimes. Currently on sick leave, was an  at [employer] Family History:    Father diagnosed with prostate cancer                                                                                               No Known Allergies    Current Outpatient Medications on File Prior to Visit   Medication Sig Dispense Refill    Cholecalciferol (VITAMIN D3 PO) Take 1 tablet by mouth daily      Ginger, Zingiber officinalis, (GINGER PO) Take 1 tablet by mouth daily      OLANZapine (ZYPREXA) 5 MG tablet One tablet po HS for four nights starting the night before chemotherapy 16 tablet 0    ondansetron (ZOFRAN) 8 MG tablet Take 1 tablet by mouth every 8 hours as needed for Nausea or Vomiting 30 tablet 1    dexamethasone (DECADRON) 4 MG tablet 1 tablet PO daily for 4 days starting the day after chemotherapy. 16 tablet 0    FLUoxetine (PROZAC) 20 MG capsule Take 20 mg by mouth daily      ibuprofen (ADVIL;MOTRIN) 600 MG tablet Take 600 mg by mouth every 6 hours as needed for Pain      ATORVASTATIN CALCIUM PO Take by mouth      lisinopril (PRINIVIL;ZESTRIL) 20 MG tablet TAKE ONE TABLET BY MOUTH DAILY 90 tablet 1    acyclovir (ZOVIRAX) 400 MG tablet Take 1 tablet by mouth daily 90 tablet 1     No current facility-administered medications on file prior to visit. Interval history:3/3/22: She arrived alone to the clinic today. Reported that she did yard work yesterday. Yesterday she had back pain. Eyes are watering a lot, also reported blurred vision.  No associated itching. Needs new glasses as they are scratched a lot. No HA,  No focal weakness. Intermittent numbness in the feet which gets better with activity. Intermittent GIB. Constipation. Review of Systems:  As per internal history     Vital Signs: /60 (Site: Left Upper Arm, Position: Sitting, Cuff Size: Large Adult)   Pulse 91   Temp 96.3 °F (35.7 °C) (Infrared)   Ht 5' 1\" (1.549 m)   Wt 246 lb (111.6 kg)   SpO2 97%   BMI 46.48 kg/m²     CONSTITUTIONAL: awake, alert, obese  EYES: IGNACIO, No pallor or any icterus  ENT: ATNC  NECK: No JVD, port in the right side, slight edema and ttp. Mild erythema. HEMATOLOGIC/LYMPHATIC: no cervical, supraclavicular or axillary lymphadenopathy   LUNGS: CTAB  CARDIOVASCULAR: s1s2 rrr no murmurs  ABDOMEN: soft ntnd bs pos  NEUROLOGIC: GI  SKIN: No rash  EXTREMITIES: no LE edema bilaterally  Breast, did not appreciate left breast mass, did not appreciate any axillary lymphadenopathy.   No masses in the right breast or right axillary lymphadenopathy     Labs:  Hematology:  Lab Results   Component Value Date    WBC 7.3 02/17/2022    RBC 3.17 (L) 02/17/2022    HGB 10.2 (L) 02/17/2022    HCT 31.4 (L) 02/17/2022    MCV 99.1 02/17/2022    MCH 32.2 (H) 02/17/2022    MCHC 32.5 02/17/2022    RDW 14.5 02/17/2022     02/17/2022    MPV 9.1 02/17/2022    SEGSPCT 77.4 (H) 02/17/2022    EOSRELPCT 0.0 02/17/2022    BASOPCT 0.7 02/17/2022    LYMPHOPCT 15.0 (L) 02/17/2022    MONOPCT 6.9 (H) 02/17/2022    SEGSABS 5.6 02/17/2022    EOSABS 0.0 02/17/2022    BASOSABS 0.1 02/17/2022    LYMPHSABS 1.1 02/17/2022    MONOSABS 0.5 02/17/2022    DIFFTYPE AUTOMATED DIFFERENTIAL 02/17/2022     No results found for: ESR  Chemistry:  Lab Results   Component Value Date     02/17/2022    K 4.1 02/17/2022     02/17/2022    CO2 27 02/17/2022    BUN 11 02/17/2022    CREATININE 0.6 02/17/2022    GLUCOSE 105 (H) 02/17/2022    CALCIUM 9.2 02/17/2022    PROT 6.6 02/17/2022    LABALBU 4.1 02/17/2022 BILITOT 0.3 02/17/2022    ALKPHOS 138 (H) 02/17/2022    AST 14 (L) 02/17/2022    ALT 18 02/17/2022    LABGLOM >60 02/17/2022    GFRAA >60 02/17/2022    AGRATIO 1.7 10/11/2016    GLOB 2.4 10/11/2016     No results found for: MMA, LDH, HOMOCYSTEINE  No components found for: LD  Lab Results   Component Value Date    TSHHS 1.650 02/24/2021    T4FREE 1.04 02/24/2021    FT3 2.8 02/24/2021     Immunology:  Lab Results   Component Value Date    PROT 6.6 02/17/2022     No results found for: Gin Carrera, KLFLCR  No results found for: B2M  Coagulation Panel:  No results found for: PROTIME, INR, APTT, DDIMER  Anemia Panel:  No results found for: UGSCOYDE19, FOLATE  Tumor Markers:  Lab Results   Component Value Date    LABCA2 29.6 01/19/2022        Observations:  ECOG:  No data recorded       Assessment & Plan:                                                          Triple negative breast cancer,biopsy on 12/1/2021,  left, 1.1 cm, grade 2, no evidence of any axillary  lymphadenopathy per available imaging studies. PET scan jan 2022 with no other met disease. Ca 27-29 was 29.6 in jan 2021 . Discussed the findings, diagnosis, possible staging and recommended neoadjuvant chemotherapy with AC followed by T followed by surgical resection followed by radiation pending surgical modality(Note genetic testing negative). Discussed the adverse effects of doxorubicin, cyclophosphamide and Taxol. Answered all questions. PORT completed and OCM completed. Echocardiogram jan 12 2022 with preserved EF.   C1D1 started jan 20 2022. Dose adjustments according to adverse effects and cytopenias. Recommend growth factor support. Will follow per NCCN guidelines after completion of all the treatments    NC anemia; Most probably sec to chemotherapy. Will monitor for now. Transfusion support    Mild elevation of the liver enzymes,?  most probably secondary to alcohol as PET with no liver mets. Alk PO4 remains mildly elevated. Recommend complete cessation of alcohol. Constipation: recommended stool softener and laxatives as needed. ?Seasonal allergies: Recommended trial of claritin    Neuropathy: Intermittent and mild and chronic. Note h/o ETOH. Will monitor for now. Defers pharmacological intervention at this point    Continue other medical care. Thank you for letting us be part of the care and will follow along. Discussed above findings and plan with her and she voiced understanding. Answered all her questions. Discussed healthy lifestyle including healthy diet, regular exercise as tolerated. Also discussed importance of being up-to-date with age-appropriate screening tools. Quit smoking and taking THC  Quit ETOH intake    Recommend follow-up with primary care physician and other specialists. Please do not hesitate to contact us if you need further information. Return to clinic March 2022 or earlier if new Sx    I have recommended that the patient follow CDC guidelines for prevention of COVID-19 infection. Received COVID vaccine, booster not yet. No Flu vaccine either, never taken.      [address]

## 2022-03-03 NOTE — PROGRESS NOTES
Patient will be starting weekly Taxol infusions starting 3/17/22. New calendar given to patient with dates and how/when to take dexamethasone. New script sent for dexamethasone 2 mg to [facility]. Patient instructed to take a total of 8 mg (4 tabs) the night before first chemo infusion only - voiced understanding. To then take 2 mg in the am x2 days after each chemo infusion. Instructed patient to call with any questions/concerns.

## 2022-03-08 ENCOUNTER — TELEPHONE (OUTPATIENT)
Dept: ONCOLOGY | Age: 57
End: 2022-03-08

## 2022-03-08 NOTE — TELEPHONE ENCOUNTER
Left message for patient regarding genetic testing. 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

## 2022-03-16 ENCOUNTER — CLINICAL DOCUMENTATION (OUTPATIENT)
Dept: ONCOLOGY | Age: 57
End: 2022-03-16

## 2022-03-16 ENCOUNTER — CLINICAL DOCUMENTATION (OUTPATIENT)
Dept: CASE MANAGEMENT | Age: 57
End: 2022-03-16

## 2022-03-16 RX ORDER — ACETAMINOPHEN 325 MG/1
650 TABLET ORAL
Status: CANCELLED | OUTPATIENT
Start: 2022-03-31

## 2022-03-16 RX ORDER — EPINEPHRINE 1 MG/ML
0.3 INJECTION, SOLUTION, CONCENTRATE INTRAVENOUS PRN
Status: CANCELLED | OUTPATIENT
Start: 2022-03-31

## 2022-03-16 RX ORDER — SODIUM CHLORIDE 0.9 % (FLUSH) 0.9 %
5-40 SYRINGE (ML) INJECTION PRN
Status: CANCELLED | OUTPATIENT
Start: 2022-03-31

## 2022-03-16 RX ORDER — ONDANSETRON 2 MG/ML
8 INJECTION INTRAMUSCULAR; INTRAVENOUS
Status: CANCELLED | OUTPATIENT
Start: 2022-03-31

## 2022-03-16 RX ORDER — HEPARIN SODIUM (PORCINE) LOCK FLUSH IV SOLN 100 UNIT/ML 100 UNIT/ML
500 SOLUTION INTRAVENOUS PRN
Status: CANCELLED | OUTPATIENT
Start: 2022-03-31

## 2022-03-16 RX ORDER — SODIUM CHLORIDE 9 MG/ML
20 INJECTION, SOLUTION INTRAVENOUS ONCE
Status: CANCELLED | OUTPATIENT
Start: 2022-03-31 | End: 2022-03-17

## 2022-03-16 RX ORDER — DIPHENHYDRAMINE HYDROCHLORIDE 50 MG/ML
50 INJECTION INTRAMUSCULAR; INTRAVENOUS
Status: CANCELLED | OUTPATIENT
Start: 2022-03-31

## 2022-03-16 RX ORDER — DIPHENHYDRAMINE HYDROCHLORIDE 50 MG/ML
50 INJECTION INTRAMUSCULAR; INTRAVENOUS ONCE
Status: CANCELLED | OUTPATIENT
Start: 2022-03-31

## 2022-03-16 RX ORDER — MEPERIDINE HYDROCHLORIDE 25 MG/ML
12.5 INJECTION INTRAMUSCULAR; INTRAVENOUS; SUBCUTANEOUS PRN
Status: CANCELLED | OUTPATIENT
Start: 2022-03-31

## 2022-03-16 RX ORDER — SODIUM CHLORIDE 9 MG/ML
25 INJECTION, SOLUTION INTRAVENOUS PRN
Status: CANCELLED | OUTPATIENT
Start: 2022-03-31

## 2022-03-16 RX ORDER — ALBUTEROL SULFATE 90 UG/1
4 AEROSOL, METERED RESPIRATORY (INHALATION) PRN
Status: CANCELLED | OUTPATIENT
Start: 2022-03-31

## 2022-03-16 RX ORDER — SODIUM CHLORIDE 9 MG/ML
INJECTION, SOLUTION INTRAVENOUS CONTINUOUS
Status: CANCELLED | OUTPATIENT
Start: 2022-03-31

## 2022-03-16 RX ORDER — SODIUM CHLORIDE 9 MG/ML
5-40 INJECTION INTRAVENOUS PRN
Status: CANCELLED | OUTPATIENT
Start: 2022-03-31

## 2022-03-16 NOTE — PROGRESS NOTES
Called patient to inform that she does not have to take dexamethasone prior to treatment tomorrow. Informed to take dexamethasone 2 mg x 2 days following each weekly taxol treatment. Patient is scheduled for chemotherapy tomorrow 03/17/22 @ [id number]. Left VM.

## 2022-03-16 NOTE — PROGRESS NOTES
Called and LVM to patient that she does need to take dexamethasone 8 mg tonight ONLY as educated on 3/3/22 - see documentation from 3/3/22, along with 2 mg two days after each treatment. Left direct contact information for patient to call this RN directly with any further questions/concerns.

## 2022-03-17 ENCOUNTER — HOSPITAL ENCOUNTER (OUTPATIENT)
Age: 57
Discharge: HOME | End: 2022-03-17
Payer: COMMERCIAL

## 2022-03-17 ENCOUNTER — HOSPITAL ENCOUNTER (OUTPATIENT)
Dept: INFUSION THERAPY | Age: 57
Discharge: HOME | End: 2022-03-17
Payer: COMMERCIAL

## 2022-03-17 ENCOUNTER — HOSPITAL ENCOUNTER (OUTPATIENT)
Dept: GENERAL RADIOLOGY | Age: 57
Discharge: HOME | End: 2022-03-17
Payer: COMMERCIAL

## 2022-03-17 ENCOUNTER — OFFICE VISIT (OUTPATIENT)
Dept: ONCOLOGY | Age: 57
End: 2022-03-17
Payer: COMMERCIAL

## 2022-03-17 VITALS
OXYGEN SATURATION: 97 % | RESPIRATION RATE: 16 BRPM | HEART RATE: 95 BPM | BODY MASS INDEX: 46.86 KG/M2 | WEIGHT: 248.2 LBS | DIASTOLIC BLOOD PRESSURE: 73 MMHG | TEMPERATURE: 96.7 F | HEIGHT: 61 IN | SYSTOLIC BLOOD PRESSURE: 123 MMHG

## 2022-03-17 VITALS
TEMPERATURE: 96.7 F | WEIGHT: 248.2 LBS | HEART RATE: 98 BPM | BODY MASS INDEX: 46.86 KG/M2 | SYSTOLIC BLOOD PRESSURE: 123 MMHG | OXYGEN SATURATION: 97 % | HEIGHT: 61 IN | DIASTOLIC BLOOD PRESSURE: 73 MMHG

## 2022-03-17 DIAGNOSIS — D70.1 AGRANULOCYTOSIS SECONDARY TO CANCER CHEMOTHERAPY (CODE) (HCC): ICD-10-CM

## 2022-03-17 DIAGNOSIS — R05.9 COUGH: ICD-10-CM

## 2022-03-17 DIAGNOSIS — R06.00 DYSPNEA, UNSPECIFIED TYPE: ICD-10-CM

## 2022-03-17 DIAGNOSIS — R91.8 LUNG NODULES: ICD-10-CM

## 2022-03-17 DIAGNOSIS — R74.8 ELEVATED LIVER ENZYMES: ICD-10-CM

## 2022-03-17 DIAGNOSIS — Z17.1 MALIGNANT NEOPLASM OF LOWER-INNER QUADRANT OF LEFT BREAST IN FEMALE, ESTROGEN RECEPTOR NEGATIVE (HCC): ICD-10-CM

## 2022-03-17 DIAGNOSIS — C50.312 MALIGNANT NEOPLASM OF LOWER-INNER QUADRANT OF LEFT BREAST IN FEMALE, ESTROGEN RECEPTOR NEGATIVE (HCC): ICD-10-CM

## 2022-03-17 DIAGNOSIS — Z17.1 MALIGNANT NEOPLASM OF LOWER-INNER QUADRANT OF LEFT BREAST IN FEMALE, ESTROGEN RECEPTOR NEGATIVE (HCC): Primary | ICD-10-CM

## 2022-03-17 DIAGNOSIS — C50.312 MALIGNANT NEOPLASM OF LOWER-INNER QUADRANT OF LEFT BREAST IN FEMALE, ESTROGEN RECEPTOR NEGATIVE (HCC): Primary | ICD-10-CM

## 2022-03-17 LAB
ALBUMIN SERPL-MCNC: 4.3 GM/DL (ref 3.4–5)
ALP BLD-CCNC: 105 IU/L (ref 40–128)
ALT SERPL-CCNC: 21 U/L (ref 10–40)
ANION GAP SERPL CALCULATED.3IONS-SCNC: 16 MMOL/L (ref 4–16)
AST SERPL-CCNC: 16 IU/L (ref 15–37)
BASOPHILS ABSOLUTE: 0 K/CU MM
BASOPHILS RELATIVE PERCENT: 0.9 % (ref 0–1)
BILIRUB SERPL-MCNC: 0.5 MG/DL (ref 0–1)
BUN BLDV-MCNC: 13 MG/DL (ref 6–23)
CALCIUM SERPL-MCNC: 9.6 MG/DL (ref 8.3–10.6)
CHLORIDE BLD-SCNC: 103 MMOL/L (ref 99–110)
CO2: 24 MMOL/L (ref 21–32)
CREAT SERPL-MCNC: 0.4 MG/DL (ref 0.6–1.1)
DIFFERENTIAL TYPE: ABNORMAL
EOSINOPHILS ABSOLUTE: 0 K/CU MM
EOSINOPHILS RELATIVE PERCENT: 0 % (ref 0–3)
GFR AFRICAN AMERICAN: >60 ML/MIN/1.73M2
GFR NON-AFRICAN AMERICAN: >60 ML/MIN/1.73M2
GLUCOSE BLD-MCNC: 133 MG/DL (ref 70–99)
HCT VFR BLD CALC: 31.3 % (ref 37–47)
HEMOGLOBIN: 10.1 GM/DL (ref 12.5–16)
LYMPHOCYTES ABSOLUTE: 0.2 K/CU MM
LYMPHOCYTES RELATIVE PERCENT: 21.1 % (ref 24–44)
MCH RBC QN AUTO: 32.1 PG (ref 27–31)
MCHC RBC AUTO-ENTMCNC: 32.3 % (ref 32–36)
MCV RBC AUTO: 99.4 FL (ref 78–100)
MONOCYTES ABSOLUTE: 0.2 K/CU MM
MONOCYTES RELATIVE PERCENT: 14.9 % (ref 0–4)
PDW BLD-RTO: 17 % (ref 11.7–14.9)
PLATELET # BLD: 222 K/CU MM (ref 140–440)
PMV BLD AUTO: 9.1 FL (ref 7.5–11.1)
POTASSIUM SERPL-SCNC: 4.5 MMOL/L (ref 3.5–5.1)
RBC # BLD: 3.15 M/CU MM (ref 4.2–5.4)
SEGMENTED NEUTROPHILS ABSOLUTE COUNT: 0.7 K/CU MM
SEGMENTED NEUTROPHILS RELATIVE PERCENT: 63.1 % (ref 36–66)
SODIUM BLD-SCNC: 143 MMOL/L (ref 135–145)
TOTAL PROTEIN: 6.6 GM/DL (ref 6.4–8.2)
WBC # BLD: 1.1 K/CU MM (ref 4–10.5)

## 2022-03-17 PROCEDURE — 85025 COMPLETE CBC W/AUTO DIFF WBC: CPT

## 2022-03-17 PROCEDURE — 36591 DRAW BLOOD OFF VENOUS DEVICE: CPT

## 2022-03-17 PROCEDURE — 99214 OFFICE O/P EST MOD 30 MIN: CPT | Performed by: NURSE PRACTITIONER

## 2022-03-17 PROCEDURE — 71046 X-RAY EXAM CHEST 2 VIEWS: CPT

## 2022-03-17 PROCEDURE — 6360000002 HC RX W HCPCS

## 2022-03-17 PROCEDURE — 80053 COMPREHEN METABOLIC PANEL: CPT

## 2022-03-17 RX ORDER — DEXAMETHASONE 4 MG/1
TABLET ORAL
Qty: 2 TABLET | Refills: 0 | Status: SHIPPED | OUTPATIENT
Start: 2022-03-17 | End: 2022-07-07

## 2022-03-17 RX ORDER — HEPARIN SODIUM (PORCINE) LOCK FLUSH IV SOLN 100 UNIT/ML 100 UNIT/ML
SOLUTION INTRAVENOUS
Status: COMPLETED
Start: 2022-03-17 | End: 2022-03-17

## 2022-03-17 RX ORDER — LEVOFLOXACIN 250 MG/1
500 TABLET ORAL DAILY
Qty: 7 TABLET | Refills: 0 | Status: SHIPPED | OUTPATIENT
Start: 2022-03-17 | End: 2022-03-27

## 2022-03-17 RX ADMIN — HEPARIN 500 UNITS: 100 SYRINGE at 10:35

## 2022-03-17 ASSESSMENT — PATIENT HEALTH QUESTIONNAIRE - PHQ9
2. FEELING DOWN, DEPRESSED OR HOPELESS: 0
1. LITTLE INTEREST OR PLEASURE IN DOING THINGS: 0
SUM OF ALL RESPONSES TO PHQ QUESTIONS 1-9: 0
SUM OF ALL RESPONSES TO PHQ QUESTIONS 1-9: 0
SUM OF ALL RESPONSES TO PHQ9 QUESTIONS 1 & 2: 0
SUM OF ALL RESPONSES TO PHQ QUESTIONS 1-9: 0
SUM OF ALL RESPONSES TO PHQ QUESTIONS 1-9: 0

## 2022-03-17 NOTE — PROGRESS NOTES
Patient arrived to treatment suite for blood draw, pre-medications and chemotherapy infusion. Right chest mediport accessed and blood drawn from site and sent to lab for processing. Patient stated problems with eyes watering, but she has an appointment with Tita Rome NP, today and will tell her. WBC returned at 1.14 and ANC at 0.72. Dr. Kay Cao delayed treatment for a week. Patient informed and rescheduled. Left treatment suite ambulatory.

## 2022-03-17 NOTE — PROGRESS NOTES
MA Rooming Questions  Patient: Guilherme Bolanos  MRN: [id number]    Date: 3/17/2022        1. Do you have any new issues? yes - Blurry vision, watery eyes (2 weeks)         2. Do you need any refills on medications?    no    3. Have you had any imaging done since your last visit?   no    4. Have you been hospitalized or seen in the emergency room since your last visit here?   no    5. Did the patient have a depression screening completed today?  Yes    PHQ-9 Total Score: 0 (3/17/2022 10:42 AM)       PHQ-9 Given to (if applicable):               PHQ-9 Score (if applicable):                     [] Positive     []  Negative              Does question #9 need addressed (if applicable)                     [] Yes    []  No               Deandre Ayon CMA

## 2022-03-17 NOTE — PROGRESS NOTES
Patient Name:  Rosy Valladares  Patient :  1965  Patient MRN:  [id number]     Primary Oncologist: Radha Tyson MD  Referring Provider: Jeanna Boxer, PA-C     Date of Service: 3/17/2022      Reason for Consult:  TNBC     Chief Complaint:    Chief Complaint   Patient presents with    Discuss Labs       No diagnosis found. HPI:   2021: She had with her  to the clinic today. Reported that she was found to have a mass in the left breast on routine mammogram.  Denied any axial lymphadenopathy. Denied any weight loss. Denied any new bone pains. Denied any focal weakness. Reported blurred vision, needs new glasses. Headaches lately. But reported that she recently quit drinking alcohol. Denied any chest pain or increase shortness of breath. Denied any abdominal pain, nausea vomiting, diarrhea, constipation    21: Mammogram:  Recommend spot gene synthesis compression views and possible ultrasound for   focal asymmetry in the left upper inner breast, middle depth.       BIRADS:   BIRADS - CATEGORY 0       Incomplete: Needs Additional Imaging Evaluation     11/10/21: Mammogram:  0.7 x 0.8 x 0.7 cm suspicious mass left breast 8 o'clock, about 3-4 cm from   the left nipple.  This likely correlates with the mammographic finding. Ultrasound-guided biopsy is advised.       Recommend post biopsy mammographic correlation to confirm that it correlates   with the mammographic finding.       These results and recommendations were discussed with the patient by Dr. Mary Sanders will be assisted to schedule the recommended biopsy by the breast   nurse navigator.  An order will be requested from her referring physician.       BIRADS:   BIRADS - CATEGORY 4B       Intermediate suspicion for malignancy.       Suspicious Abnormality. Biopsy should be considered at this time.      12/10/21:Final Pathologic Diagnosis:   Breast, left, 8 o'clock, ultrasound guided needle core   biopsy:   - Nohelia Beams DUCTAL CARCINOMA (see comment, see stains   report). BREAST INVASIVE CARCINOMA SUMMARY - CORE BIOPSY   Procedure:  Ultrasound guided needle core biopsy. Laterality and tumor site:  Left breast at 8 o'clock. Tumor size:  7 mm. in greatest dimension within biopsy   material present. Histologic type:  Invasive ductal carcinoma.     Histologic Grade ([place]): Grade 2,        -Tubular score 3, Nuclear score 3, Mitosis score 1 (7   /10 hpf). Ductal Carcinoma In Situ (DCIS):  Not present. Lobular Carcinoma In Situ (LCIS):  Not present.     Lympho-vascular invasion:  Not identified.     Microcalcifications:  Present.     Additional Findings:  None.     Ancillary studies: ER/PgR/Her2 results from the current   biopsy are as follows:   -ER by [facility]*: Negative (0% staining)   -PgR by IHC*: Negative (0% staining)   -Her2 by IHC*: Negative (Score 0)   -Her2 by FISH*:Negative   -Avg. #HER2 signals/nucleus: 1.8, Avg. #CEP17   signals/nucleus: 1.72, HER2/CEP17 ratio: 1.05      12/16/2021 CBC WBC of 4.8 hemoglobin of 12.6 hematocrit of 39.6 MCV of 100.5 and platelets of 260. Sodium of 138 potassium 4.6 BUN of 11 creatinine 0.4. LFTs with alk phos 155 ALT 54    12/23/2021 CT scan of the abdomen pelvis with IV contrast revealed couple of noncalcified micro nodules in the lower lobe of the right lung. 1/10/22: PORT placed    1/17/22: PET:  Approximate 1.1 cm mass of the left lower inner breast with minimal focal FDG   avidity which may correspond to the known primary.  There is no evidence of   metastatic disease.         C1D1 neoadjuvant AC started 1/20/22    Past Medical History:     Hyperlipidemia, hypertension, depression                                                           Past Surgery History: Total abdominal hysterectomy, bilateral salpingo-oophorectomy                                                                          Social History:   Lives with a partner.   Has 2 children 25 and 33, 2 daughters. Quit smoking about 25 years ago prior to which she smoked 1 pack/day for 7 years. Quit drinking alcohol on 12/2/2021 prior to which she consumed 4-5 beers per day and also whiskey sometimes. Currently on sick leave, was an  at [employer] Family History:    Father diagnosed with prostate cancer                                                                                               No Known Allergies    Current Outpatient Medications on File Prior to Visit   Medication Sig Dispense Refill    dexamethasone (DECADRON) 2 MG tablet Take 1 tablet by mouth daily (with breakfast) for two days AFTER chemotherapy. Take 8 mg (4 tabs) the night before 1st chemo ONLY. 28 tablet 0    Cholecalciferol (VITAMIN D3 PO) Take 1 tablet by mouth daily      Ginger, Zingiber officinalis, (GINGER PO) Take 1 tablet by mouth daily      OLANZapine (ZYPREXA) 5 MG tablet One tablet po HS for four nights starting the night before chemotherapy 16 tablet 0    ondansetron (ZOFRAN) 8 MG tablet Take 1 tablet by mouth every 8 hours as needed for Nausea or Vomiting 30 tablet 1    FLUoxetine (PROZAC) 20 MG capsule Take 20 mg by mouth daily      ibuprofen (ADVIL;MOTRIN) 600 MG tablet Take 600 mg by mouth every 6 hours as needed for Pain      ATORVASTATIN CALCIUM PO Take by mouth      lisinopril (PRINIVIL;ZESTRIL) 20 MG tablet TAKE ONE TABLET BY MOUTH DAILY 90 tablet 1    acyclovir (ZOVIRAX) 400 MG tablet Take 1 tablet by mouth daily 90 tablet 1     No current facility-administered medications on file prior to visit. Interval history:3/17/22: Arrived alone to clinic today. She reports some chest heaviness with productive cough. She denies fever, chills, night sweats she reports no dysuria, no open wounds. Port intact. She denies headache, focal weakness, she denies numbness in her feet. She denies new bone pain. Appetite intact. She reports food tastes bad and her mouth feels \"foggy \". No oral ulcers. Review of Systems:  As per internal history     Vital Signs: /73 (Site: Right Upper Arm, Position: Sitting, Cuff Size: Large Adult)   Pulse 95   Temp 96.7 °F (35.9 °C) (Infrared)   Resp 16   Ht 5' 1\" (1.549 m)   Wt 248 lb 3.2 oz (112.6 kg)   SpO2 97%   BMI 46.90 kg/m²     CONSTITUTIONAL: awake, alert, obese  EYES: IGNACIO, No pallor or any icterus  ENT: ATNC  NECK: No JVD, port in the right side, slight edema and ttp. Mild erythema. HEMATOLOGIC/LYMPHATIC: no cervical, supraclavicular or axillary lymphadenopathy   LUNGS: CTAB  CARDIOVASCULAR: s1s2 rrr no murmurs  ABDOMEN: soft ntnd bs pos  NEUROLOGIC: GI  SKIN: No rash  EXTREMITIES: no LE edema bilaterally  Breast, did not appreciate left breast mass, did not appreciate any axillary lymphadenopathy.   No masses in the right breast or right axillary lymphadenopathy     Labs:  Hematology:  Lab Results   Component Value Date    WBC 1.1 (LL) 03/17/2022    RBC 3.15 (L) 03/17/2022    HGB 10.1 (L) 03/17/2022    HCT 31.3 (L) 03/17/2022    MCV 99.4 03/17/2022    MCH 32.1 (H) 03/17/2022    MCHC 32.3 03/17/2022    RDW 17.0 (H) 03/17/2022     03/17/2022    MPV 9.1 03/17/2022    SEGSPCT 63.1 03/17/2022    EOSRELPCT 0.0 03/17/2022    BASOPCT 0.9 03/17/2022    LYMPHOPCT 21.1 (L) 03/17/2022    MONOPCT 14.9 (H) 03/17/2022    SEGSABS 0.7 03/17/2022    EOSABS 0.0 03/17/2022    BASOSABS 0.0 03/17/2022    LYMPHSABS 0.2 03/17/2022    MONOSABS 0.2 03/17/2022    DIFFTYPE AUTOMATED DIFFERENTIAL 03/17/2022     No results found for: ESR  Chemistry:  Lab Results   Component Value Date     03/03/2022    K 4.2 03/03/2022     03/03/2022    CO2 27 03/03/2022    BUN 13 03/03/2022    CREATININE 0.6 03/03/2022    GLUCOSE 94 03/03/2022    CALCIUM 8.8 03/03/2022    PROT 5.7 (L) 03/03/2022    LABALBU 3.8 03/03/2022    BILITOT 0.4 03/03/2022    ALKPHOS 117 03/03/2022    AST 14 (L) 03/03/2022    ALT 14 03/03/2022    LABGLOM >60 03/03/2022    GFRAA >60 03/03/2022    AGRATIO 1.7 10/11/2016    GLOB 2.4 10/11/2016     No results found for: MMA, LDH, HOMOCYSTEINE  No components found for: LD  Lab Results   Component Value Date    TSHHS 1.650 02/24/2021    T4FREE 1.04 02/24/2021    FT3 2.8 02/24/2021     Immunology:  Lab Results   Component Value Date    PROT 5.7 (L) 03/03/2022     No results found for: Catherine Frankfort, KLFLCR  No results found for: B2M  Coagulation Panel:  No results found for: PROTIME, INR, APTT, DDIMER  Anemia Panel:  No results found for: [id number], FOLATE  Tumor Markers:  Lab Results   Component Value Date    LABCA2 29.6 01/19/2022        Observations:  ECOG:  PHQ-9 Total Score: 0 (3/17/2022 10:42 AM)       Assessment & Plan:                                                          Triple negative breast cancer,biopsy on 12/1/2021,  left, 1.1 cm, grade 2, no evidence of any axillary  lymphadenopathy per available imaging studies. PET scan jan 2022 with no other met disease. Ca 27-29 was 29.6 in jan 2021 . Discussed the findings, diagnosis, possible staging and recommended neoadjuvant chemotherapy with AC followed by T followed by surgical resection followed by radiation pending surgical modality(Note genetic testing negative). Discussed the adverse effects of doxorubicin, cyclophosphamide and Taxol. Answered all questions. PORT completed and OCM completed. Echocardiogram jan 12 2022 with preserved EF.   C1D1 started jan 20 2022. Dose adjustments according to adverse effects and cytopenias. Recommend growth factor support. Will follow per NCCN guidelines after completion of all the treatments  C1 Taxol 3/17/22 delayed due to neutropenia. CXR ordered due to cough. Cover with levaquin. Dr. Erich Gutierrez aware of labs, delay of treatment. NC anemia; Most probably sec to chemotherapy. Will monitor for now. Transfusion support    Mild elevation of the liver enzymes,? most probably secondary to alcohol as PET with no liver mets. Alk PO4 remains mildly elevated. Recommend complete cessation of alcohol. CMP pending. Constipation: recommended stool softener and laxatives as needed. ?Seasonal allergies: Recommended trial of claritin    Neuropathy: Intermittent and mild and chronic. Note h/o ETOH. Will monitor for now. Defers pharmacological intervention at this point    Continue other medical care. Thank you for letting us be part of the care and will follow along. Discussed above findings and plan with her and she voiced understanding. Answered all her questions. Discussed healthy lifestyle including healthy diet, regular exercise as tolerated. Also discussed importance of being up-to-date with age-appropriate screening tools. Quit smoking and taking THC  Quit ETOH intake    Recommend follow-up with primary care physician and other specialists. Please do not hesitate to contact us if you need further information. Return to clinic March 2022 or earlier if new Sx    I have recommended that the patient follow CDC guidelines for prevention of COVID-19 infection. Received COVID vaccine, booster not yet. No Flu vaccine either, never taken.

## 2022-03-22 ENCOUNTER — CLINICAL DOCUMENTATION (OUTPATIENT)
Dept: CASE MANAGEMENT | Age: 57
End: 2022-03-22

## 2022-03-22 NOTE — PROGRESS NOTES
Patient had CXR on 3/17/22 which revealed below:  The patient's right chest infusion port catheter has flipped superiorly as compared to prior whole-body PET-CT 01/17/2022 with catheter directed superiorly into the neck, likely in the right internal jugular vein. Tip is not included in the field-of-view. Dr. Annmarie Alfonso has been notified and will see patient in office tomorrow, 3/23/22 and will revise/replace port Friday, 3/25/22. Patient notified - LVM. Will hold patient's chemo until new port placed/revised.

## 2022-03-31 ENCOUNTER — HOSPITAL ENCOUNTER (OUTPATIENT)
Dept: INFUSION THERAPY | Age: 57
Discharge: HOME | End: 2022-03-31
Payer: COMMERCIAL

## 2022-03-31 ENCOUNTER — CLINICAL DOCUMENTATION (OUTPATIENT)
Dept: RADIATION ONCOLOGY | Age: 57
End: 2022-03-31

## 2022-03-31 ENCOUNTER — OFFICE VISIT (OUTPATIENT)
Dept: ONCOLOGY | Age: 57
End: 2022-03-31
Payer: COMMERCIAL

## 2022-03-31 VITALS
TEMPERATURE: 98 F | WEIGHT: 246 LBS | DIASTOLIC BLOOD PRESSURE: 94 MMHG | BODY MASS INDEX: 46.44 KG/M2 | HEIGHT: 61 IN | OXYGEN SATURATION: 95 % | HEART RATE: 109 BPM | RESPIRATION RATE: 16 BRPM | SYSTOLIC BLOOD PRESSURE: 140 MMHG

## 2022-03-31 DIAGNOSIS — Z17.1 MALIGNANT NEOPLASM OF LOWER-INNER QUADRANT OF LEFT BREAST IN FEMALE, ESTROGEN RECEPTOR NEGATIVE (HCC): Primary | ICD-10-CM

## 2022-03-31 DIAGNOSIS — C50.312 MALIGNANT NEOPLASM OF LOWER-INNER QUADRANT OF LEFT BREAST IN FEMALE, ESTROGEN RECEPTOR NEGATIVE (HCC): Primary | ICD-10-CM

## 2022-03-31 DIAGNOSIS — R74.8 ELEVATED LIVER ENZYMES: ICD-10-CM

## 2022-03-31 DIAGNOSIS — R91.8 LUNG NODULES: ICD-10-CM

## 2022-03-31 DIAGNOSIS — D70.1 AGRANULOCYTOSIS SECONDARY TO CANCER CHEMOTHERAPY (CODE) (HCC): ICD-10-CM

## 2022-03-31 LAB
ALBUMIN SERPL-MCNC: 4.4 GM/DL (ref 3.4–5)
ALP BLD-CCNC: 104 IU/L (ref 40–128)
ALT SERPL-CCNC: 31 U/L (ref 10–40)
ANION GAP SERPL CALCULATED.3IONS-SCNC: 18 MMOL/L (ref 4–16)
AST SERPL-CCNC: 23 IU/L (ref 15–37)
BASOPHILS ABSOLUTE: 0 K/CU MM
BASOPHILS RELATIVE PERCENT: 0.1 % (ref 0–1)
BILIRUB SERPL-MCNC: 0.8 MG/DL (ref 0–1)
BUN BLDV-MCNC: 12 MG/DL (ref 6–23)
CALCIUM SERPL-MCNC: 9.4 MG/DL (ref 8.3–10.6)
CHLORIDE BLD-SCNC: 101 MMOL/L (ref 99–110)
CO2: 20 MMOL/L (ref 21–32)
CREAT SERPL-MCNC: 0.4 MG/DL (ref 0.6–1.1)
DIFFERENTIAL TYPE: ABNORMAL
EOSINOPHILS ABSOLUTE: 0 K/CU MM
EOSINOPHILS RELATIVE PERCENT: 0 % (ref 0–3)
GFR AFRICAN AMERICAN: >60 ML/MIN/1.73M2
GFR NON-AFRICAN AMERICAN: >60 ML/MIN/1.73M2
GLUCOSE BLD-MCNC: 162 MG/DL (ref 70–99)
HCT VFR BLD CALC: 35.6 % (ref 37–47)
HEMOGLOBIN: 11.7 GM/DL (ref 12.5–16)
LYMPHOCYTES ABSOLUTE: 0.5 K/CU MM
LYMPHOCYTES RELATIVE PERCENT: 6.4 % (ref 24–44)
MCH RBC QN AUTO: 32.3 PG (ref 27–31)
MCHC RBC AUTO-ENTMCNC: 32.9 % (ref 32–36)
MCV RBC AUTO: 98.3 FL (ref 78–100)
MONOCYTES ABSOLUTE: 0.1 K/CU MM
MONOCYTES RELATIVE PERCENT: 1.5 % (ref 0–4)
PDW BLD-RTO: 15.2 % (ref 11.7–14.9)
PLATELET # BLD: 270 K/CU MM (ref 140–440)
PMV BLD AUTO: 9.5 FL (ref 7.5–11.1)
POTASSIUM SERPL-SCNC: 4 MMOL/L (ref 3.5–5.1)
RBC # BLD: 3.62 M/CU MM (ref 4.2–5.4)
SEGMENTED NEUTROPHILS ABSOLUTE COUNT: 6.6 K/CU MM
SEGMENTED NEUTROPHILS RELATIVE PERCENT: 92 % (ref 36–66)
SODIUM BLD-SCNC: 139 MMOL/L (ref 135–145)
TOTAL PROTEIN: 6.5 GM/DL (ref 6.4–8.2)
WBC # BLD: 7.2 K/CU MM (ref 4–10.5)

## 2022-03-31 PROCEDURE — 96413 CHEMO IV INFUSION 1 HR: CPT

## 2022-03-31 PROCEDURE — 96374 THER/PROPH/DIAG INJ IV PUSH: CPT

## 2022-03-31 PROCEDURE — 96375 TX/PRO/DX INJ NEW DRUG ADDON: CPT

## 2022-03-31 PROCEDURE — 2500000003 HC RX 250 WO HCPCS: Performed by: INTERNAL MEDICINE

## 2022-03-31 PROCEDURE — 2580000003 HC RX 258: Performed by: INTERNAL MEDICINE

## 2022-03-31 PROCEDURE — 80053 COMPREHEN METABOLIC PANEL: CPT

## 2022-03-31 PROCEDURE — 85025 COMPLETE CBC W/AUTO DIFF WBC: CPT

## 2022-03-31 PROCEDURE — 6360000002 HC RX W HCPCS: Performed by: INTERNAL MEDICINE

## 2022-03-31 PROCEDURE — 99214 OFFICE O/P EST MOD 30 MIN: CPT | Performed by: INTERNAL MEDICINE

## 2022-03-31 RX ORDER — DIPHENHYDRAMINE HYDROCHLORIDE 50 MG/ML
50 INJECTION INTRAMUSCULAR; INTRAVENOUS ONCE
Status: COMPLETED | OUTPATIENT
Start: 2022-03-31 | End: 2022-03-31

## 2022-03-31 RX ORDER — SODIUM CHLORIDE 9 MG/ML
20 INJECTION, SOLUTION INTRAVENOUS ONCE
Status: COMPLETED | OUTPATIENT
Start: 2022-03-31 | End: 2022-03-31

## 2022-03-31 RX ORDER — SODIUM CHLORIDE 0.9 % (FLUSH) 0.9 %
5-40 SYRINGE (ML) INJECTION PRN
Status: DISCONTINUED | OUTPATIENT
Start: 2022-03-31 | End: 2022-04-01 | Stop reason: HOSPADM

## 2022-03-31 RX ORDER — HEPARIN SODIUM (PORCINE) LOCK FLUSH IV SOLN 100 UNIT/ML 100 UNIT/ML
500 SOLUTION INTRAVENOUS PRN
Status: DISCONTINUED | OUTPATIENT
Start: 2022-03-31 | End: 2022-04-01 | Stop reason: HOSPADM

## 2022-03-31 RX ORDER — LIDOCAINE AND PRILOCAINE 25; 25 MG/G; MG/G
CREAM TOPICAL
Qty: 30 G | Refills: 1 | Status: SHIPPED | OUTPATIENT
Start: 2022-03-31 | End: 2022-08-10

## 2022-03-31 RX ADMIN — DEXAMETHASONE SODIUM PHOSPHATE 10 MG: 10 INJECTION INTRAMUSCULAR; INTRAVENOUS at 11:36

## 2022-03-31 RX ADMIN — SODIUM CHLORIDE 20 ML/HR: 9 INJECTION, SOLUTION INTRAVENOUS at 11:35

## 2022-03-31 RX ADMIN — HEPARIN 500 UNITS: 100 SYRINGE at 13:33

## 2022-03-31 RX ADMIN — DIPHENHYDRAMINE HYDROCHLORIDE 50 MG: 50 INJECTION INTRAMUSCULAR; INTRAVENOUS at 11:30

## 2022-03-31 RX ADMIN — FAMOTIDINE 20 MG: 10 INJECTION, SOLUTION INTRAVENOUS at 11:30

## 2022-03-31 RX ADMIN — PACLITAXEL 174 MG: 6 INJECTION, SOLUTION INTRAVENOUS at 12:23

## 2022-03-31 RX ADMIN — SODIUM CHLORIDE, PRESERVATIVE FREE 20 ML: 5 INJECTION INTRAVENOUS at 13:33

## 2022-03-31 ASSESSMENT — PATIENT HEALTH QUESTIONNAIRE - PHQ9
SUM OF ALL RESPONSES TO PHQ9 QUESTIONS 1 & 2: 0
1. LITTLE INTEREST OR PLEASURE IN DOING THINGS: 0
SUM OF ALL RESPONSES TO PHQ QUESTIONS 1-9: 0
2. FEELING DOWN, DEPRESSED OR HOPELESS: 0
SUM OF ALL RESPONSES TO PHQ QUESTIONS 1-9: 0

## 2022-03-31 NOTE — PROGRESS NOTES
EMLA cream 30 g tube with 2 refills e-scripted to [facility]wick on [facility]. Patient instructed to apply a generous amount to port area and cover with saran wrap 30 minutes prior to coming for treatments.

## 2022-03-31 NOTE — PROGRESS NOTES
Patient Name:  Victorino Cooper  Patient :  1965  Patient MRN:  [id number]     Primary Oncologist: Caridad Runner, MD  Referring Provider: Colonel Vanessa PA-C     Date of Service: 3/31/2022      Reason for Consult:  TNBC     Chief Complaint:    Chief Complaint   Patient presents with    Follow-up       Encounter Diagnoses   Name Primary? 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 Reported that she was found to have a mass in the left breast on routine mammogram.  Denied any axial lymphadenopathy. Denied any weight loss. Denied any new bone pains. Denied any focal weakness. Reported blurred vision, needs new glasses. Headaches lately. But reported that she recently quit drinking alcohol. Denied any chest pain or increase shortness of breath. Denied any abdominal pain, nausea vomiting, diarrhea, constipation    21: Mammogram:  Recommend spot gene synthesis compression views and possible ultrasound for   focal asymmetry in the left upper inner breast, middle depth.       BIRADS:   BIRADS - CATEGORY 0       Incomplete: Needs Additional Imaging Evaluation     11/10/21: Mammogram:  0.7 x 0.8 x 0.7 cm suspicious mass left breast 8 o'clock, about 3-4 cm from   the left nipple.  This likely correlates with the mammographic finding.    Ultrasound-guided biopsy is advised.       Recommend post biopsy mammographic correlation to confirm that it correlates   with the mammographic finding.       These results and recommendations were discussed with the patient by Dr. Vandana Greens will be assisted to schedule the recommended biopsy by the breast   nurse navigator.  An order will be requested from her referring physician.       BIRADS:   BIRADS - CATEGORY 4B     Intermediate suspicion for malignancy.       Suspicious Abnormality. Biopsy should be considered at this time. 12/10/21:Final Pathologic Diagnosis:   Breast, left, 8 o'clock, ultrasound guided needle core   biopsy:   -  INVASIVE DUCTAL CARCINOMA (see comment, see stains   report). BREAST INVASIVE CARCINOMA SUMMARY - CORE BIOPSY   Procedure:  Ultrasound guided needle core biopsy. Laterality and tumor site:  Left breast at 8 o'clock. Tumor size:  7 mm. in greatest dimension within biopsy   material present. Histologic type:  Invasive ductal carcinoma.     Histologic Grade (Jennifer): Grade 2,        -Tubular score 3, Nuclear score 3, Mitosis score 1 (7   /10 hpf). Ductal Carcinoma In Situ (DCIS):  Not present. Lobular Carcinoma In Situ (LCIS):  Not present.     Lympho-vascular invasion:  Not identified.     Microcalcifications:  Present.     Additional Findings:  None.     Ancillary studies: ER/PgR/Her2 results from the current   biopsy are as follows:   -ER by [facility]*: Negative (0% staining)   -PgR by IHC*: Negative (0% staining)   -Her2 by IHC*: Negative (Score 0)   -Her2 by FISH*:Negative   -Avg. #HER2 signals/nucleus: 1.8, Avg. #CEP17   signals/nucleus: 1.72, HER2/CEP17 ratio: 1.05      12/16/2021 CBC WBC of 4.8 hemoglobin of 12.6 hematocrit of 39.6 MCV of 100.5 and platelets of 042. Sodium of 138 potassium 4.6 BUN of 11 creatinine 0.4. LFTs with alk phos 155 ALT 54    12/23/2021 CT scan of the abdomen pelvis with IV contrast revealed couple of noncalcified micro nodules in the lower lobe of the right lung.     1/10/22: PORT placed    1/17/22: PET:  Approximate 1.1 cm mass of the left lower inner breast with minimal focal FDG   avidity which may correspond to the known primary. Maynor William is no evidence of   metastatic disease.         C1D1 neoadjuvant AC started 1/20/22    3/31/22: C1D1 taxol    Past Medical History:     Hyperlipidemia, hypertension, depression Past Surgery History: Total abdominal hysterectomy, bilateral salpingo-oophorectomy                                                                          Social History:   Lives with a partner. Has 2 children 22 and 35, 2 daughters. Quit smoking about 25 years ago prior to which she smoked 1 pack/day for 7 years. Quit drinking alcohol on 12/2/2021 prior to which she consumed 4-5 beers per day and also whiskey sometimes. Currently on sick leave, was an  at [employer] Family History:    Father diagnosed with prostate cancer                                                                                               No Known Allergies    Current Outpatient Medications on File Prior to Visit   Medication Sig Dispense Refill    dexamethasone (DECADRON) 4 MG tablet Two tablets (8 mg) po day before chemotherapy with taxol 2 tablet 0    dexamethasone (DECADRON) 2 MG tablet Take 1 tablet by mouth daily (with breakfast) for two days AFTER chemotherapy. Take 8 mg (4 tabs) the night before 1st chemo ONLY.  28 tablet 0    Cholecalciferol (VITAMIN D3 PO) Take 1 tablet by mouth daily      Ginger, Zingiber officinalis, (GINGER PO) Take 1 tablet by mouth daily      OLANZapine (ZYPREXA) 5 MG tablet One tablet po HS for four nights starting the night before chemotherapy 16 tablet 0    ondansetron (ZOFRAN) 8 MG tablet Take 1 tablet by mouth every 8 hours as needed for Nausea or Vomiting 30 tablet 1    FLUoxetine (PROZAC) 20 MG capsule Take 20 mg by mouth daily      ibuprofen (ADVIL;MOTRIN) 600 MG tablet Take 600 mg by mouth every 6 hours as needed for Pain      ATORVASTATIN CALCIUM PO Take by mouth      lisinopril (PRINIVIL;ZESTRIL) 20 MG tablet TAKE ONE TABLET BY MOUTH DAILY 90 tablet 1    acyclovir (ZOVIRAX) 400 MG tablet Take 1 tablet by mouth daily 90 tablet 1     No current facility-administered medications on file prior to visit. Interval history:3/31/22: She arrived alone to the clinic today. Overall feels fine other than the fact that her port was not functioning well. Bottom of the feet hurting  And Urdu slight numbness in the bottom of the toes. Eyes are dry, drops has helped. Vision better. No focal weakness. No new breast mass, no axillary lad. No chest pain, palpitations or any dizziness. Reported wheezing and cough productive of white sputum at times. Sometimes she feels more sob with the coughing spells. No abdominal pain, nausea, emesis, diarrhea or any constipation. Review of Systems:  As per internal history     Vital Signs: BP (!) 140/94 (Site: Left Upper Arm, Position: Sitting, Cuff Size: Large Adult)   Pulse 109   Temp 98 °F (36.7 °C) (Infrared)   Resp 16   Ht 5' 1\" (1.549 m)   Wt 246 lb (111.6 kg)   SpO2 95%   BMI 46.48 kg/m²     CONSTITUTIONAL: awake, alert, obese  EYES: IGNACIO, No pallor or any icterus  ENT: ATNC  NECK: No JVD, port in the right side, slight edema and ttp. Mild erythema. HEMATOLOGIC/LYMPHATIC: no cervical, supraclavicular or axillary lymphadenopathy   LUNGS: CTAB  CARDIOVASCULAR: s1s2 rrr no murmurs  ABDOMEN: soft ntnd bs pos  NEUROLOGIC: GI  SKIN: No rash  EXTREMITIES: no LE edema bilaterally  Breast, did not really appreciate left breast mass, did not appreciate any axillary lymphadenopathy.   No masses in the right breast or right axillary lymphadenopathy     Labs:  Hematology:  Lab Results   Component Value Date    WBC 7.2 03/31/2022    RBC 3.62 (L) 03/31/2022    HGB 11.7 (L) 03/31/2022    HCT 35.6 (L) 03/31/2022    MCV 98.3 03/31/2022    MCH 32.3 (H) 03/31/2022    MCHC 32.9 03/31/2022    RDW 15.2 (H) 03/31/2022     03/31/2022    MPV 9.5 03/31/2022    SEGSPCT 92.0 (H) 03/31/2022    EOSRELPCT 0.0 03/31/2022    BASOPCT 0.1 03/31/2022    LYMPHOPCT 6.4 (L) 03/31/2022    MONOPCT 1.5 03/31/2022    SEGSABS 6.6 03/31/2022    EOSABS 0.0 03/31/2022    BASOSABS 0.0 03/31/2022    LYMPHSABS 0.5 03/31/2022    MONOSABS 0.1 03/31/2022    DIFFTYPE AUTOMATED DIFFERENTIAL 03/31/2022     No results found for: ESR  Chemistry:  Lab Results   Component Value Date     03/17/2022    K 4.5 03/17/2022     03/17/2022    CO2 24 03/17/2022    BUN 13 03/17/2022    CREATININE 0.4 (L) 03/17/2022    GLUCOSE 133 (H) 03/17/2022    CALCIUM 9.6 03/17/2022    PROT 6.6 03/17/2022    LABALBU 4.3 03/17/2022    BILITOT 0.5 03/17/2022    ALKPHOS 105 03/17/2022    AST 16 03/17/2022    ALT 21 03/17/2022    LABGLOM >60 03/17/2022    GFRAA >60 03/17/2022    AGRATIO 1.7 10/11/2016    GLOB 2.4 10/11/2016     No results found for: MMA, LDH, HOMOCYSTEINE  No components found for: LD  Lab Results   Component Value Date    TSHHS 1.650 02/24/2021    T4FREE 1.04 02/24/2021    FT3 2.8 02/24/2021     Immunology:  Lab Results   Component Value Date    PROT 6.6 03/17/2022     No results found for: Pham Dillon, KLFLCR  No results found for: B2M  Coagulation Panel:  No results found for: PROTIME, INR, APTT, DDIMER  Anemia Panel:  No results found for: [id number], FOLATE  Tumor Markers:  Lab Results   Component Value Date    LABCA2 29.6 01/19/2022        Observations:  ECOG:  PHQ-9 Total Score: 0 (3/31/2022 10:02 AM)       Assessment & Plan:                                                          Triple negative breast cancer,biopsy on 12/1/2021,  left, 1.1 cm, grade 2, no evidence of any axillary  lymphadenopathy per available imaging studies. PET scan jan 2022 with no other met disease. Ca 27-29 was 29.6 in jan 2021 . Discussed the findings, diagnosis, possible staging and recommended neoadjuvant chemotherapy with AC followed by T followed by surgical resection followed by radiation pending surgical modality(Note genetic testing negative, Pt yet undecided). Discussed the adverse effects of doxorubicin, cyclophosphamide and Taxol. Answered all questions. PORT completed and OCM completed. Echocardiogram jan 12 2022 with preserved EF.   C1D1 AC started jan 20 2022. Completed march 3 2022   Dose adjustments were made according to adverse effects and cytopenias. Started Neoadjuvant Taxol march 31/2022   Will contact surgeon after 2M of taxol  Will follow per NCCN guidelines after completion of all the treatments    NC anemia; Most probably sec to chemotherapy. Will monitor for now. Transfusion support    Neutropenia: was most probably sec to chemotherapy. WBC normalized    Mild elevation of the liver enzymes,?  most probably secondary to alcohol as PET with no liver mets. Recommend complete cessation of alcohol. Normalized    Constipation: recommended stool softener and laxatives as needed. ?Seasonal allergies: Recommended trial of claritin    Neuropathy: Intermittent and mild and chronic. Note h/o ETOH. Will monitor for now. Defers pharmacological intervention at this point    Continue other medical care. Thank you for letting us be part of the care and will follow along. Discussed above findings and plan with her and she voiced understanding. Answered all her questions. Discussed healthy lifestyle including healthy diet, regular exercise as tolerated. Also discussed importance of being up-to-date with age-appropriate screening tools. Quit smoking and taking THC  Recommend ETOH cessation    Recommend follow-up with primary care physician and other specialists. Please do not hesitate to contact us if you need further information. Return to clinic may 2022 or earlier if new Sx    I have recommended that the patient follow CDC guidelines for prevention of COVID-19 infection. Received COVID vaccine, booster not yet. No Flu vaccine either, never taken.      [address]

## 2022-03-31 NOTE — CARE COORDINATION
LSW met with Pt today and CCOP services were reviewed. Pt stated she is working at [employer] on [employer] while receiving her treatment and she comes home very tired. At Pt's request a referral was made to [employer].

## 2022-03-31 NOTE — PROGRESS NOTES
Ambulated to infusion area,after office visit, for chemotherapy.  Right chest mediport accessed, positive blood return noted. Labs drawn and sent to lab for processing.  Chemo administered as ordered. Call light within reach. Tolerated infusion without incident. .Carina Reyna instructions refused.      Status appropriately assessed and documented. All required labs and results reviewed. Treatment approved by provider. Treatment orders and medications verified by 2 Registered Nurses where applicable. Treatment plan was confirmed with patient prior to administration, and educated the need to report any treatment-related symptoms        Prior to administration, when applicable, the following 8 elements of medication administration were reviewed with 2nd Registered Nurse prior to dosing: drug name, drug dose, infusion volume when prepared in a syringe, rate of administration, expiration dates and/or times, appearance and integrity of drug(s), and rate of pump for infusion.  The 5 rights of medication administration have been verified.

## 2022-03-31 NOTE — PROGRESS NOTES
MA Rooming Questions  Patient: Melisa Goldmann  MRN: [id number]    Date: 3/31/2022        1. Do you have any new issues? yes - Numbness in toes (bilateral)         2. Do you need any refills on medications?    no    3. Have you had any imaging done since your last visit? yes - XR    4. Have you been hospitalized or seen in the emergency room since your last visit here?   no    5. Did the patient have a depression screening completed today?  Yes    PHQ-9 Total Score: 0 (3/31/2022 10:02 AM)       PHQ-9 Given to (if applicable):               PHQ-9 Score (if applicable):                     [] Positive     []  Negative              Does question #9 need addressed (if applicable)                     [] Yes    []  No               Kitty De Oliveira CMA

## 2022-04-06 RX ORDER — ALBUTEROL SULFATE 90 UG/1
4 AEROSOL, METERED RESPIRATORY (INHALATION) PRN
Status: CANCELLED | OUTPATIENT
Start: 2022-04-07

## 2022-04-06 RX ORDER — ONDANSETRON 2 MG/ML
8 INJECTION INTRAMUSCULAR; INTRAVENOUS
Status: CANCELLED | OUTPATIENT
Start: 2022-04-07

## 2022-04-06 RX ORDER — MEPERIDINE HYDROCHLORIDE 25 MG/ML
12.5 INJECTION INTRAMUSCULAR; INTRAVENOUS; SUBCUTANEOUS PRN
Status: CANCELLED | OUTPATIENT
Start: 2022-04-07

## 2022-04-06 RX ORDER — EPINEPHRINE 1 MG/ML
0.3 INJECTION, SOLUTION, CONCENTRATE INTRAVENOUS PRN
Status: CANCELLED | OUTPATIENT
Start: 2022-04-07

## 2022-04-06 RX ORDER — SODIUM CHLORIDE 9 MG/ML
25 INJECTION, SOLUTION INTRAVENOUS PRN
Status: CANCELLED | OUTPATIENT
Start: 2022-04-07

## 2022-04-06 RX ORDER — SODIUM CHLORIDE 0.9 % (FLUSH) 0.9 %
5-40 SYRINGE (ML) INJECTION PRN
Status: CANCELLED | OUTPATIENT
Start: 2022-04-07

## 2022-04-06 RX ORDER — SODIUM CHLORIDE 9 MG/ML
5-40 INJECTION INTRAVENOUS PRN
Status: CANCELLED | OUTPATIENT
Start: 2022-04-07

## 2022-04-06 RX ORDER — SODIUM CHLORIDE 9 MG/ML
INJECTION, SOLUTION INTRAVENOUS CONTINUOUS
Status: CANCELLED | OUTPATIENT
Start: 2022-04-07

## 2022-04-06 RX ORDER — ACETAMINOPHEN 325 MG/1
650 TABLET ORAL
Status: CANCELLED | OUTPATIENT
Start: 2022-04-07

## 2022-04-06 RX ORDER — DIPHENHYDRAMINE HYDROCHLORIDE 50 MG/ML
50 INJECTION INTRAMUSCULAR; INTRAVENOUS
Status: CANCELLED | OUTPATIENT
Start: 2022-04-07

## 2022-04-07 ENCOUNTER — HOSPITAL ENCOUNTER (OUTPATIENT)
Dept: INFUSION THERAPY | Age: 57
Discharge: HOME | End: 2022-04-07
Payer: COMMERCIAL

## 2022-04-07 VITALS
WEIGHT: 249.2 LBS | HEIGHT: 61 IN | DIASTOLIC BLOOD PRESSURE: 70 MMHG | RESPIRATION RATE: 16 BRPM | TEMPERATURE: 96.7 F | HEART RATE: 81 BPM | BODY MASS INDEX: 47.05 KG/M2 | SYSTOLIC BLOOD PRESSURE: 128 MMHG | OXYGEN SATURATION: 95 %

## 2022-04-07 DIAGNOSIS — Z17.1 MALIGNANT NEOPLASM OF LOWER-INNER QUADRANT OF LEFT BREAST IN FEMALE, ESTROGEN RECEPTOR NEGATIVE (HCC): Primary | ICD-10-CM

## 2022-04-07 DIAGNOSIS — C50.312 MALIGNANT NEOPLASM OF LOWER-INNER QUADRANT OF LEFT BREAST IN FEMALE, ESTROGEN RECEPTOR NEGATIVE (HCC): Primary | ICD-10-CM

## 2022-04-07 LAB
BASOPHILS ABSOLUTE: 0 K/CU MM
BASOPHILS RELATIVE PERCENT: 1.2 % (ref 0–1)
DIFFERENTIAL TYPE: ABNORMAL
EOSINOPHILS ABSOLUTE: 0.2 K/CU MM
EOSINOPHILS RELATIVE PERCENT: 5.2 % (ref 0–3)
HCT VFR BLD CALC: 32.6 % (ref 37–47)
HEMOGLOBIN: 10.6 GM/DL (ref 12.5–16)
LYMPHOCYTES ABSOLUTE: 0.7 K/CU MM
LYMPHOCYTES RELATIVE PERCENT: 20.2 % (ref 24–44)
MCH RBC QN AUTO: 32.6 PG (ref 27–31)
MCHC RBC AUTO-ENTMCNC: 32.5 % (ref 32–36)
MCV RBC AUTO: 100.3 FL (ref 78–100)
MONOCYTES ABSOLUTE: 0.2 K/CU MM
MONOCYTES RELATIVE PERCENT: 6.6 % (ref 0–4)
PDW BLD-RTO: 15.6 % (ref 11.7–14.9)
PLATELET # BLD: 212 K/CU MM (ref 140–440)
PMV BLD AUTO: 9.6 FL (ref 7.5–11.1)
RBC # BLD: 3.25 M/CU MM (ref 4.2–5.4)
SEGMENTED NEUTROPHILS ABSOLUTE COUNT: 2.3 K/CU MM
SEGMENTED NEUTROPHILS RELATIVE PERCENT: 66.8 % (ref 36–66)
WBC # BLD: 3.5 K/CU MM (ref 4–10.5)

## 2022-04-07 PROCEDURE — 2500000003 HC RX 250 WO HCPCS: Performed by: INTERNAL MEDICINE

## 2022-04-07 PROCEDURE — 96413 CHEMO IV INFUSION 1 HR: CPT

## 2022-04-07 PROCEDURE — 96375 TX/PRO/DX INJ NEW DRUG ADDON: CPT

## 2022-04-07 PROCEDURE — 2580000003 HC RX 258: Performed by: INTERNAL MEDICINE

## 2022-04-07 PROCEDURE — 85025 COMPLETE CBC W/AUTO DIFF WBC: CPT

## 2022-04-07 PROCEDURE — 6360000002 HC RX W HCPCS: Performed by: INTERNAL MEDICINE

## 2022-04-07 RX ORDER — SODIUM CHLORIDE 9 MG/ML
20 INJECTION, SOLUTION INTRAVENOUS ONCE
Status: DISCONTINUED | OUTPATIENT
Start: 2022-04-07 | End: 2022-04-08 | Stop reason: HOSPADM

## 2022-04-07 RX ORDER — HEPARIN SODIUM (PORCINE) LOCK FLUSH IV SOLN 100 UNIT/ML 100 UNIT/ML
500 SOLUTION INTRAVENOUS PRN
Status: DISCONTINUED | OUTPATIENT
Start: 2022-04-07 | End: 2022-04-08 | Stop reason: HOSPADM

## 2022-04-07 RX ORDER — DIPHENHYDRAMINE HYDROCHLORIDE 50 MG/ML
50 INJECTION INTRAMUSCULAR; INTRAVENOUS ONCE
Status: COMPLETED | OUTPATIENT
Start: 2022-04-07 | End: 2022-04-07

## 2022-04-07 RX ADMIN — HEPARIN 500 UNITS: 100 SYRINGE at 12:10

## 2022-04-07 RX ADMIN — FAMOTIDINE 20 MG: 10 INJECTION, SOLUTION INTRAVENOUS at 10:27

## 2022-04-07 RX ADMIN — PACLITAXEL 174 MG: 6 INJECTION, SOLUTION INTRAVENOUS at 10:56

## 2022-04-07 RX ADMIN — DIPHENHYDRAMINE HYDROCHLORIDE 50 MG: 50 INJECTION INTRAMUSCULAR; INTRAVENOUS at 10:27

## 2022-04-07 RX ADMIN — DEXAMETHASONE SODIUM PHOSPHATE 10 MG: 10 INJECTION INTRAMUSCULAR; INTRAVENOUS at 10:30

## 2022-04-07 RX ADMIN — SODIUM CHLORIDE 20 ML/HR: 9 INJECTION, SOLUTION INTRAVENOUS at 10:29

## 2022-04-14 ENCOUNTER — HOSPITAL ENCOUNTER (OUTPATIENT)
Dept: INFUSION THERAPY | Age: 57
Discharge: HOME | End: 2022-04-14
Payer: COMMERCIAL

## 2022-04-14 VITALS
RESPIRATION RATE: 16 BRPM | HEART RATE: 82 BPM | HEIGHT: 61 IN | SYSTOLIC BLOOD PRESSURE: 126 MMHG | WEIGHT: 249 LBS | OXYGEN SATURATION: 98 % | DIASTOLIC BLOOD PRESSURE: 73 MMHG | BODY MASS INDEX: 47.01 KG/M2 | TEMPERATURE: 97.7 F

## 2022-04-14 DIAGNOSIS — C50.312 MALIGNANT NEOPLASM OF LOWER-INNER QUADRANT OF LEFT BREAST IN FEMALE, ESTROGEN RECEPTOR NEGATIVE (HCC): Primary | ICD-10-CM

## 2022-04-14 DIAGNOSIS — Z17.1 MALIGNANT NEOPLASM OF LOWER-INNER QUADRANT OF LEFT BREAST IN FEMALE, ESTROGEN RECEPTOR NEGATIVE (HCC): Primary | ICD-10-CM

## 2022-04-14 LAB
BASOPHILS ABSOLUTE: 0 K/CU MM
BASOPHILS RELATIVE PERCENT: 0.6 % (ref 0–1)
DIFFERENTIAL TYPE: ABNORMAL
EOSINOPHILS ABSOLUTE: 0.3 K/CU MM
EOSINOPHILS RELATIVE PERCENT: 7.5 % (ref 0–3)
HCT VFR BLD CALC: 31.3 % (ref 37–47)
HEMOGLOBIN: 10.2 GM/DL (ref 12.5–16)
LYMPHOCYTES ABSOLUTE: 0.7 K/CU MM
LYMPHOCYTES RELATIVE PERCENT: 20.5 % (ref 24–44)
MCH RBC QN AUTO: 32.9 PG (ref 27–31)
MCHC RBC AUTO-ENTMCNC: 32.6 % (ref 32–36)
MCV RBC AUTO: 101 FL (ref 78–100)
MONOCYTES ABSOLUTE: 0.3 K/CU MM
MONOCYTES RELATIVE PERCENT: 8 % (ref 0–4)
PDW BLD-RTO: 15.6 % (ref 11.7–14.9)
PLATELET # BLD: 185 K/CU MM (ref 140–440)
PMV BLD AUTO: 9.3 FL (ref 7.5–11.1)
RBC # BLD: 3.1 M/CU MM (ref 4.2–5.4)
SEGMENTED NEUTROPHILS ABSOLUTE COUNT: 2.3 K/CU MM
SEGMENTED NEUTROPHILS RELATIVE PERCENT: 63.4 % (ref 36–66)
WBC # BLD: 3.6 K/CU MM (ref 4–10.5)

## 2022-04-14 PROCEDURE — 2500000003 HC RX 250 WO HCPCS: Performed by: NURSE PRACTITIONER

## 2022-04-14 PROCEDURE — 6360000002 HC RX W HCPCS: Performed by: NURSE PRACTITIONER

## 2022-04-14 PROCEDURE — 96375 TX/PRO/DX INJ NEW DRUG ADDON: CPT

## 2022-04-14 PROCEDURE — 96413 CHEMO IV INFUSION 1 HR: CPT

## 2022-04-14 PROCEDURE — 85025 COMPLETE CBC W/AUTO DIFF WBC: CPT

## 2022-04-14 PROCEDURE — 2580000003 HC RX 258: Performed by: NURSE PRACTITIONER

## 2022-04-14 RX ORDER — SODIUM CHLORIDE 9 MG/ML
5-40 INJECTION INTRAVENOUS PRN
Status: CANCELLED | OUTPATIENT
Start: 2022-04-14

## 2022-04-14 RX ORDER — DIPHENHYDRAMINE HYDROCHLORIDE 50 MG/ML
50 INJECTION INTRAMUSCULAR; INTRAVENOUS ONCE
Status: COMPLETED | OUTPATIENT
Start: 2022-04-14 | End: 2022-04-14

## 2022-04-14 RX ORDER — ALBUTEROL SULFATE 90 UG/1
4 AEROSOL, METERED RESPIRATORY (INHALATION) PRN
Status: CANCELLED | OUTPATIENT
Start: 2022-04-14

## 2022-04-14 RX ORDER — DIPHENHYDRAMINE HYDROCHLORIDE 50 MG/ML
50 INJECTION INTRAMUSCULAR; INTRAVENOUS
Status: CANCELLED | OUTPATIENT
Start: 2022-04-14

## 2022-04-14 RX ORDER — ACETAMINOPHEN 325 MG/1
650 TABLET ORAL
Status: CANCELLED | OUTPATIENT
Start: 2022-04-14

## 2022-04-14 RX ORDER — SODIUM CHLORIDE 9 MG/ML
25 INJECTION, SOLUTION INTRAVENOUS PRN
Status: CANCELLED | OUTPATIENT
Start: 2022-04-14

## 2022-04-14 RX ORDER — ONDANSETRON 2 MG/ML
8 INJECTION INTRAMUSCULAR; INTRAVENOUS
Status: CANCELLED | OUTPATIENT
Start: 2022-04-14

## 2022-04-14 RX ORDER — HEPARIN SODIUM (PORCINE) LOCK FLUSH IV SOLN 100 UNIT/ML 100 UNIT/ML
500 SOLUTION INTRAVENOUS PRN
Status: DISCONTINUED | OUTPATIENT
Start: 2022-04-14 | End: 2022-04-15 | Stop reason: HOSPADM

## 2022-04-14 RX ORDER — SODIUM CHLORIDE 9 MG/ML
INJECTION, SOLUTION INTRAVENOUS CONTINUOUS
Status: CANCELLED | OUTPATIENT
Start: 2022-04-14

## 2022-04-14 RX ORDER — MEPERIDINE HYDROCHLORIDE 25 MG/ML
12.5 INJECTION INTRAMUSCULAR; INTRAVENOUS; SUBCUTANEOUS PRN
Status: CANCELLED | OUTPATIENT
Start: 2022-04-14

## 2022-04-14 RX ORDER — SODIUM CHLORIDE 9 MG/ML
20 INJECTION, SOLUTION INTRAVENOUS ONCE
Status: DISCONTINUED | OUTPATIENT
Start: 2022-04-14 | End: 2022-04-15 | Stop reason: HOSPADM

## 2022-04-14 RX ORDER — SODIUM CHLORIDE 0.9 % (FLUSH) 0.9 %
5-40 SYRINGE (ML) INJECTION PRN
Status: CANCELLED | OUTPATIENT
Start: 2022-04-14

## 2022-04-14 RX ORDER — EPINEPHRINE 1 MG/ML
0.3 INJECTION, SOLUTION, CONCENTRATE INTRAVENOUS PRN
Status: CANCELLED | OUTPATIENT
Start: 2022-04-14

## 2022-04-14 RX ADMIN — FAMOTIDINE 20 MG: 10 INJECTION, SOLUTION INTRAVENOUS at 10:08

## 2022-04-14 RX ADMIN — SODIUM CHLORIDE 20 ML/HR: 9 INJECTION, SOLUTION INTRAVENOUS at 10:11

## 2022-04-14 RX ADMIN — HEPARIN 500 UNITS: 100 SYRINGE at 12:34

## 2022-04-14 RX ADMIN — PACLITAXEL 160 MG: 6 INJECTION, SOLUTION INTRAVENOUS at 10:59

## 2022-04-14 RX ADMIN — DEXAMETHASONE SODIUM PHOSPHATE 10 MG: 4 INJECTION INTRA-ARTICULAR; INTRALESIONAL; INTRAMUSCULAR; INTRAVENOUS; SOFT TISSUE at 10:11

## 2022-04-14 RX ADMIN — DIPHENHYDRAMINE HYDROCHLORIDE 50 MG: 50 INJECTION INTRAMUSCULAR; INTRAVENOUS at 10:09

## 2022-04-14 NOTE — PROGRESS NOTES
Ambulated to infusion area. Here for treatment. Reports constant numbness in right foot mainly toes and ball of foot which worsened after last treatment. Dr. Josh García notified. Taxol dose decreased.     Right chest mediport accessed, labs drawn. CBC within defined parameters. Taxol administered as ordered. Call light within reach. Tolerated infusion without incident. AVS provided. Discharged in stable condition.

## 2022-04-21 ENCOUNTER — HOSPITAL ENCOUNTER (OUTPATIENT)
Dept: INFUSION THERAPY | Age: 57
Discharge: HOME | End: 2022-04-21
Payer: COMMERCIAL

## 2022-04-21 ENCOUNTER — OFFICE VISIT (OUTPATIENT)
Dept: ONCOLOGY | Age: 57
End: 2022-04-21
Payer: COMMERCIAL

## 2022-04-21 VITALS
OXYGEN SATURATION: 94 % | DIASTOLIC BLOOD PRESSURE: 76 MMHG | HEIGHT: 61 IN | HEART RATE: 87 BPM | SYSTOLIC BLOOD PRESSURE: 138 MMHG | BODY MASS INDEX: 47.2 KG/M2 | WEIGHT: 250 LBS | TEMPERATURE: 96.8 F | RESPIRATION RATE: 18 BRPM

## 2022-04-21 DIAGNOSIS — C50.312 MALIGNANT NEOPLASM OF LOWER-INNER QUADRANT OF LEFT BREAST IN FEMALE, ESTROGEN RECEPTOR NEGATIVE (HCC): Primary | ICD-10-CM

## 2022-04-21 DIAGNOSIS — Z17.1 MALIGNANT NEOPLASM OF LOWER-INNER QUADRANT OF LEFT BREAST IN FEMALE, ESTROGEN RECEPTOR NEGATIVE (HCC): Primary | ICD-10-CM

## 2022-04-21 LAB
ALBUMIN SERPL-MCNC: 4.2 GM/DL (ref 3.4–5)
ALP BLD-CCNC: 86 IU/L (ref 40–128)
ALT SERPL-CCNC: 31 U/L (ref 10–40)
ANION GAP SERPL CALCULATED.3IONS-SCNC: 12 MMOL/L (ref 4–16)
AST SERPL-CCNC: 23 IU/L (ref 15–37)
BASOPHILS ABSOLUTE: 0 K/CU MM
BASOPHILS RELATIVE PERCENT: 0.5 % (ref 0–1)
BILIRUB SERPL-MCNC: 1 MG/DL (ref 0–1)
BUN BLDV-MCNC: 12 MG/DL (ref 6–23)
CALCIUM SERPL-MCNC: 9.2 MG/DL (ref 8.3–10.6)
CHLORIDE BLD-SCNC: 101 MMOL/L (ref 99–110)
CO2: 25 MMOL/L (ref 21–32)
CREAT SERPL-MCNC: 0.4 MG/DL (ref 0.6–1.1)
DIFFERENTIAL TYPE: ABNORMAL
EOSINOPHILS ABSOLUTE: 0.2 K/CU MM
EOSINOPHILS RELATIVE PERCENT: 4.2 % (ref 0–3)
GFR AFRICAN AMERICAN: >60 ML/MIN/1.73M2
GFR NON-AFRICAN AMERICAN: >60 ML/MIN/1.73M2
GLUCOSE BLD-MCNC: 91 MG/DL (ref 70–99)
HCT VFR BLD CALC: 33.4 % (ref 37–47)
HEMOGLOBIN: 11.2 GM/DL (ref 12.5–16)
LYMPHOCYTES ABSOLUTE: 0.6 K/CU MM
LYMPHOCYTES RELATIVE PERCENT: 16.5 % (ref 24–44)
MCH RBC QN AUTO: 33.7 PG (ref 27–31)
MCHC RBC AUTO-ENTMCNC: 33.5 % (ref 32–36)
MCV RBC AUTO: 100.6 FL (ref 78–100)
MONOCYTES ABSOLUTE: 0.3 K/CU MM
MONOCYTES RELATIVE PERCENT: 6.6 % (ref 0–4)
PDW BLD-RTO: 15.4 % (ref 11.7–14.9)
PLATELET # BLD: 246 K/CU MM (ref 140–440)
PMV BLD AUTO: 9.5 FL (ref 7.5–11.1)
POTASSIUM SERPL-SCNC: 4.3 MMOL/L (ref 3.5–5.1)
RBC # BLD: 3.32 M/CU MM (ref 4.2–5.4)
SEGMENTED NEUTROPHILS ABSOLUTE COUNT: 2.8 K/CU MM
SEGMENTED NEUTROPHILS RELATIVE PERCENT: 72.2 % (ref 36–66)
SODIUM BLD-SCNC: 138 MMOL/L (ref 135–145)
TOTAL PROTEIN: 6.2 GM/DL (ref 6.4–8.2)
WBC # BLD: 3.8 K/CU MM (ref 4–10.5)

## 2022-04-21 PROCEDURE — 2500000003 HC RX 250 WO HCPCS: Performed by: INTERNAL MEDICINE

## 2022-04-21 PROCEDURE — 99401 PREV MED CNSL INDIV APPRX 15: CPT | Performed by: INTERNAL MEDICINE

## 2022-04-21 PROCEDURE — 96413 CHEMO IV INFUSION 1 HR: CPT

## 2022-04-21 PROCEDURE — 80053 COMPREHEN METABOLIC PANEL: CPT

## 2022-04-21 PROCEDURE — 6360000002 HC RX W HCPCS: Performed by: INTERNAL MEDICINE

## 2022-04-21 PROCEDURE — 85025 COMPLETE CBC W/AUTO DIFF WBC: CPT

## 2022-04-21 PROCEDURE — 96375 TX/PRO/DX INJ NEW DRUG ADDON: CPT

## 2022-04-21 PROCEDURE — 2580000003 HC RX 258: Performed by: INTERNAL MEDICINE

## 2022-04-21 RX ORDER — DIPHENHYDRAMINE HYDROCHLORIDE 50 MG/ML
50 INJECTION INTRAMUSCULAR; INTRAVENOUS
Status: CANCELLED | OUTPATIENT
Start: 2022-04-21

## 2022-04-21 RX ORDER — DIPHENHYDRAMINE HYDROCHLORIDE 50 MG/ML
50 INJECTION INTRAMUSCULAR; INTRAVENOUS ONCE
Status: COMPLETED | OUTPATIENT
Start: 2022-04-21 | End: 2022-04-21

## 2022-04-21 RX ORDER — SODIUM CHLORIDE 9 MG/ML
20 INJECTION, SOLUTION INTRAVENOUS ONCE
Status: CANCELLED | OUTPATIENT
Start: 2022-04-21 | End: 2022-04-21

## 2022-04-21 RX ORDER — FAMOTIDINE 10 MG/ML
20 INJECTION, SOLUTION INTRAVENOUS ONCE
Status: COMPLETED | OUTPATIENT
Start: 2022-04-21 | End: 2022-04-21

## 2022-04-21 RX ORDER — SODIUM CHLORIDE 9 MG/ML
INJECTION, SOLUTION INTRAVENOUS CONTINUOUS
Status: CANCELLED | OUTPATIENT
Start: 2022-04-21

## 2022-04-21 RX ORDER — MEPERIDINE HYDROCHLORIDE 50 MG/ML
12.5 INJECTION INTRAMUSCULAR; INTRAVENOUS; SUBCUTANEOUS PRN
Status: CANCELLED | OUTPATIENT
Start: 2022-04-21

## 2022-04-21 RX ORDER — ONDANSETRON 2 MG/ML
8 INJECTION INTRAMUSCULAR; INTRAVENOUS
Status: CANCELLED | OUTPATIENT
Start: 2022-04-21

## 2022-04-21 RX ORDER — ALBUTEROL SULFATE 90 UG/1
4 AEROSOL, METERED RESPIRATORY (INHALATION) PRN
Status: CANCELLED | OUTPATIENT
Start: 2022-04-21

## 2022-04-21 RX ORDER — DIPHENHYDRAMINE HYDROCHLORIDE 50 MG/ML
50 INJECTION INTRAMUSCULAR; INTRAVENOUS ONCE
Status: CANCELLED | OUTPATIENT
Start: 2022-04-21

## 2022-04-21 RX ORDER — ACETAMINOPHEN 325 MG/1
650 TABLET ORAL
Status: CANCELLED | OUTPATIENT
Start: 2022-04-21

## 2022-04-21 RX ORDER — EPINEPHRINE 1 MG/ML
0.3 INJECTION, SOLUTION, CONCENTRATE INTRAVENOUS PRN
Status: CANCELLED | OUTPATIENT
Start: 2022-04-21

## 2022-04-21 RX ORDER — SODIUM CHLORIDE 9 MG/ML
20 INJECTION, SOLUTION INTRAVENOUS ONCE
Status: DISCONTINUED | OUTPATIENT
Start: 2022-04-21 | End: 2022-04-22 | Stop reason: HOSPADM

## 2022-04-21 RX ORDER — HEPARIN SODIUM (PORCINE) LOCK FLUSH IV SOLN 100 UNIT/ML 100 UNIT/ML
500 SOLUTION INTRAVENOUS PRN
Status: CANCELLED | OUTPATIENT
Start: 2022-04-21

## 2022-04-21 RX ORDER — SODIUM CHLORIDE 9 MG/ML
5-40 INJECTION INTRAVENOUS PRN
Status: CANCELLED | OUTPATIENT
Start: 2022-04-21

## 2022-04-21 RX ORDER — SODIUM CHLORIDE 9 MG/ML
25 INJECTION, SOLUTION INTRAVENOUS PRN
Status: CANCELLED | OUTPATIENT
Start: 2022-04-21

## 2022-04-21 RX ORDER — FAMOTIDINE 10 MG/ML
20 INJECTION, SOLUTION INTRAVENOUS
Status: CANCELLED | OUTPATIENT
Start: 2022-04-21

## 2022-04-21 RX ORDER — FAMOTIDINE 10 MG/ML
20 INJECTION, SOLUTION INTRAVENOUS ONCE
Status: CANCELLED | OUTPATIENT
Start: 2022-04-21

## 2022-04-21 RX ORDER — SODIUM CHLORIDE 0.9 % (FLUSH) 0.9 %
5-40 SYRINGE (ML) INJECTION PRN
Status: CANCELLED | OUTPATIENT
Start: 2022-04-21

## 2022-04-21 RX ORDER — HEPARIN SODIUM (PORCINE) LOCK FLUSH IV SOLN 100 UNIT/ML 100 UNIT/ML
500 SOLUTION INTRAVENOUS PRN
Status: DISCONTINUED | OUTPATIENT
Start: 2022-04-21 | End: 2022-04-22 | Stop reason: HOSPADM

## 2022-04-21 RX ADMIN — DEXAMETHASONE SODIUM PHOSPHATE 10 MG: 4 INJECTION INTRA-ARTICULAR; INTRALESIONAL; INTRAMUSCULAR; INTRAVENOUS; SOFT TISSUE at 11:07

## 2022-04-21 RX ADMIN — SODIUM CHLORIDE 20 ML/HR: 9 INJECTION, SOLUTION INTRAVENOUS at 11:32

## 2022-04-21 RX ADMIN — PACLITAXEL 160 MG: 6 INJECTION, SOLUTION INTRAVENOUS at 11:35

## 2022-04-21 RX ADMIN — DIPHENHYDRAMINE HYDROCHLORIDE 50 MG: 50 INJECTION INTRAMUSCULAR; INTRAVENOUS at 11:03

## 2022-04-21 RX ADMIN — FAMOTIDINE 20 MG: 10 INJECTION, SOLUTION INTRAVENOUS at 11:03

## 2022-04-21 RX ADMIN — HEPARIN 500 UNITS: 100 SYRINGE at 12:45

## 2022-04-21 NOTE — PROGRESS NOTES
Palliative Care  Assessment    Medical Oncology History:     mammogram 0.8 cm lesion left breast.  Biopsy invasive ductal carcinoma high-grade triple negative. PET scan 2022 1.1 cm mass left lower inner breast.  2022 started on dose dense Adriamycin and Cytoxan to be followed by Taxol in a neoadjuvant fashion. [facility] on March 3, 2022 and started on Taxol on 2022    Other Medical Problems:    Hypertension, hyperlipidemia, depression. Previous total abdominal hysterectomy and bilateral salpingo-oophorectomy. Personal History     A. Life Time:    Grew up in [place] area. Finished high school. Got  for a short time, two girls ages 22 and 35.  2 grandchildren ages 5 and 2. Quite close with her daughters who live around. Has a boyfriend for last 6 years. Works at [employer] for last 35 years different places. Previous history of smoking though was stopped sometime ago. History of excessive ethanol intake has stopped about 6 weeks ago. Has used recreational marijuana     B. Family:     Parents are  had 3 brothers and 3 sisters though not close with. Physical Symptoms:   2022 undergoing therapy with weekly Taxol. She was tolerating these treatments little bit better than Adriamycin and Cytoxan. Still feels quite tired but is working 40 hours a week at [employer]. She has continued to use cannabis with significant help in managing her symptoms. She did complain of issue with slight peripheral symptoms of neuropathy which she had even prior to the start of treatments. She is gaining weight with these treatments because of change in her taste and her new dietary habits.      Psychological and Cognitive Symptoms:   As described above extremely concerned about the disease process but denies any cognitive decline  Illness Understanding and Care Preferences:   Has fairly good understanding of the disease process and of course is extremely pleased with the wonderful care she has been receiving here. Existential and Spiritual:   Not a regular Quaker person. She believes and just being a good human being and treating everyone with respect and kindness  Social and Economic Resources:   Has a fairly good support system around her primarily 2 of her daughters and coworkers and a good support for her  Care Coordination:   April 21, 2022 spent time with her and her daughter. We of course talked about the disease process and her personal history made some suggestions regarding her food intake and foods to take for her to change her blood overall taste. Made some recommendation regarding abstaining being active. She also has some questions regarding possible surgery encouraged her to speak with her oncology team as well as her surgeon. Otherwise she was quite pleased with her treatments and is quite actively participating in the same.   For the most part her symptoms are not quite well managed    Time spent 15 minutes      Mendoza Alvarez MD

## 2022-04-21 NOTE — PROGRESS NOTES
MA Rooming Questions  Patient: Yeimy Hernandez  MRN: [id number]    Date: 4/21/2022        1. Do you have any new issues? yes - NUMBNESS IN TOES         2. Do you need any refills on medications? yes - Lisinopril    3. Have you had any imaging done since your last visit?   no    4. Have you been hospitalized or seen in the emergency room since your last visit here?   no    5. Did the patient have a depression screening completed today?  No    No data recorded     PHQ-9 Given to (if applicable):               PHQ-9 Score (if applicable):                     [] Positive     []  Negative              Does question #9 need addressed (if applicable)                     [] Yes    []  No               Berdie Marrow, CMA

## 2022-04-28 ENCOUNTER — HOSPITAL ENCOUNTER (OUTPATIENT)
Dept: INFUSION THERAPY | Age: 57
Discharge: HOME | End: 2022-04-28
Payer: COMMERCIAL

## 2022-04-28 VITALS
TEMPERATURE: 96.6 F | HEIGHT: 61 IN | SYSTOLIC BLOOD PRESSURE: 129 MMHG | DIASTOLIC BLOOD PRESSURE: 76 MMHG | WEIGHT: 253 LBS | OXYGEN SATURATION: 93 % | BODY MASS INDEX: 47.77 KG/M2

## 2022-04-28 DIAGNOSIS — Z17.1 MALIGNANT NEOPLASM OF LOWER-INNER QUADRANT OF LEFT BREAST IN FEMALE, ESTROGEN RECEPTOR NEGATIVE (HCC): Primary | ICD-10-CM

## 2022-04-28 DIAGNOSIS — C50.312 MALIGNANT NEOPLASM OF LOWER-INNER QUADRANT OF LEFT BREAST IN FEMALE, ESTROGEN RECEPTOR NEGATIVE (HCC): Primary | ICD-10-CM

## 2022-04-28 LAB
BASOPHILS ABSOLUTE: 0 K/CU MM
BASOPHILS RELATIVE PERCENT: 0.5 % (ref 0–1)
DIFFERENTIAL TYPE: ABNORMAL
EOSINOPHILS ABSOLUTE: 0.1 K/CU MM
EOSINOPHILS RELATIVE PERCENT: 2.6 % (ref 0–3)
HCT VFR BLD CALC: 35.1 % (ref 37–47)
HEMOGLOBIN: 11.1 GM/DL (ref 12.5–16)
LYMPHOCYTES ABSOLUTE: 0.7 K/CU MM
LYMPHOCYTES RELATIVE PERCENT: 17 % (ref 24–44)
MCH RBC QN AUTO: 32.8 PG (ref 27–31)
MCHC RBC AUTO-ENTMCNC: 31.6 % (ref 32–36)
MCV RBC AUTO: 103.8 FL (ref 78–100)
MONOCYTES ABSOLUTE: 0.2 K/CU MM
MONOCYTES RELATIVE PERCENT: 6.2 % (ref 0–4)
PDW BLD-RTO: 15.4 % (ref 11.7–14.9)
PLATELET # BLD: 238 K/CU MM (ref 140–440)
PMV BLD AUTO: 9.6 FL (ref 7.5–11.1)
RBC # BLD: 3.38 M/CU MM (ref 4.2–5.4)
SEGMENTED NEUTROPHILS ABSOLUTE COUNT: 2.9 K/CU MM
SEGMENTED NEUTROPHILS RELATIVE PERCENT: 73.7 % (ref 36–66)
WBC # BLD: 3.9 K/CU MM (ref 4–10.5)

## 2022-04-28 PROCEDURE — 2500000003 HC RX 250 WO HCPCS: Performed by: INTERNAL MEDICINE

## 2022-04-28 PROCEDURE — 96367 TX/PROPH/DG ADDL SEQ IV INF: CPT

## 2022-04-28 PROCEDURE — 96413 CHEMO IV INFUSION 1 HR: CPT

## 2022-04-28 PROCEDURE — 96375 TX/PRO/DX INJ NEW DRUG ADDON: CPT

## 2022-04-28 PROCEDURE — 6360000002 HC RX W HCPCS: Performed by: INTERNAL MEDICINE

## 2022-04-28 PROCEDURE — 2580000003 HC RX 258: Performed by: INTERNAL MEDICINE

## 2022-04-28 PROCEDURE — 85025 COMPLETE CBC W/AUTO DIFF WBC: CPT

## 2022-04-28 RX ORDER — ONDANSETRON 2 MG/ML
8 INJECTION INTRAMUSCULAR; INTRAVENOUS
Status: CANCELLED | OUTPATIENT
Start: 2022-04-28

## 2022-04-28 RX ORDER — SODIUM CHLORIDE 9 MG/ML
5-40 INJECTION INTRAVENOUS PRN
Status: CANCELLED | OUTPATIENT
Start: 2022-04-28

## 2022-04-28 RX ORDER — SODIUM CHLORIDE 0.9 % (FLUSH) 0.9 %
5-40 SYRINGE (ML) INJECTION PRN
Status: CANCELLED | OUTPATIENT
Start: 2022-04-28

## 2022-04-28 RX ORDER — SODIUM CHLORIDE 9 MG/ML
20 INJECTION, SOLUTION INTRAVENOUS ONCE
Status: DISCONTINUED | OUTPATIENT
Start: 2022-04-28 | End: 2022-04-29 | Stop reason: HOSPADM

## 2022-04-28 RX ORDER — FAMOTIDINE 10 MG/ML
20 INJECTION, SOLUTION INTRAVENOUS
Status: CANCELLED | OUTPATIENT
Start: 2022-04-28

## 2022-04-28 RX ORDER — ALBUTEROL SULFATE 90 UG/1
4 AEROSOL, METERED RESPIRATORY (INHALATION) PRN
Status: CANCELLED | OUTPATIENT
Start: 2022-04-28

## 2022-04-28 RX ORDER — ACETAMINOPHEN 325 MG/1
650 TABLET ORAL
Status: CANCELLED | OUTPATIENT
Start: 2022-04-28

## 2022-04-28 RX ORDER — DIPHENHYDRAMINE HYDROCHLORIDE 50 MG/ML
50 INJECTION INTRAMUSCULAR; INTRAVENOUS ONCE
Status: COMPLETED | OUTPATIENT
Start: 2022-04-28 | End: 2022-04-28

## 2022-04-28 RX ORDER — DIPHENHYDRAMINE HYDROCHLORIDE 50 MG/ML
50 INJECTION INTRAMUSCULAR; INTRAVENOUS
Status: CANCELLED | OUTPATIENT
Start: 2022-04-28

## 2022-04-28 RX ORDER — SODIUM CHLORIDE 9 MG/ML
INJECTION, SOLUTION INTRAVENOUS CONTINUOUS
Status: CANCELLED | OUTPATIENT
Start: 2022-04-28

## 2022-04-28 RX ORDER — MEPERIDINE HYDROCHLORIDE 25 MG/ML
12.5 INJECTION INTRAMUSCULAR; INTRAVENOUS; SUBCUTANEOUS PRN
Status: CANCELLED | OUTPATIENT
Start: 2022-04-28

## 2022-04-28 RX ORDER — EPINEPHRINE 1 MG/ML
0.3 INJECTION, SOLUTION, CONCENTRATE INTRAVENOUS PRN
Status: CANCELLED | OUTPATIENT
Start: 2022-04-28

## 2022-04-28 RX ORDER — SODIUM CHLORIDE 9 MG/ML
25 INJECTION, SOLUTION INTRAVENOUS PRN
Status: CANCELLED | OUTPATIENT
Start: 2022-04-28

## 2022-04-28 RX ORDER — FAMOTIDINE 10 MG/ML
20 INJECTION, SOLUTION INTRAVENOUS ONCE
Status: COMPLETED | OUTPATIENT
Start: 2022-04-28 | End: 2022-04-28

## 2022-04-28 RX ORDER — HEPARIN SODIUM (PORCINE) LOCK FLUSH IV SOLN 100 UNIT/ML 100 UNIT/ML
500 SOLUTION INTRAVENOUS PRN
Status: DISCONTINUED | OUTPATIENT
Start: 2022-04-28 | End: 2022-04-29 | Stop reason: HOSPADM

## 2022-04-28 RX ADMIN — SODIUM CHLORIDE 20 ML/HR: 9 INJECTION, SOLUTION INTRAVENOUS at 10:16

## 2022-04-28 RX ADMIN — DEXAMETHASONE SODIUM PHOSPHATE 10 MG: 10 INJECTION INTRAMUSCULAR; INTRAVENOUS at 10:28

## 2022-04-28 RX ADMIN — PACLITAXEL 162 MG: 6 INJECTION, SOLUTION INTRAVENOUS at 10:58

## 2022-04-28 RX ADMIN — DIPHENHYDRAMINE HYDROCHLORIDE 50 MG: 50 INJECTION INTRAMUSCULAR; INTRAVENOUS at 10:12

## 2022-04-28 RX ADMIN — FAMOTIDINE 20 MG: 10 INJECTION, SOLUTION INTRAVENOUS at 10:12

## 2022-04-28 RX ADMIN — HEPARIN 500 UNITS: 100 SYRINGE at 12:12

## 2022-04-28 ASSESSMENT — PAIN SCALES - GENERAL: PAINLEVEL_OUTOF10: 0

## 2022-04-28 NOTE — PROGRESS NOTES
Ambulated to infusion area, here today for chemotherapy. No concerns at this time. Right chest mediport accessed, positive blood return noted. Labs drawn. Labs within defined limits. Chemo administered as ordered. Call light within reach. Tolerated infusion without incident. Discharge instructions provided. Patient RTC 05/05 for next infusion. Status appropriately assessed and documented. All required labs and results reviewed. Treatment approved by provider. Treatment orders and medications verified by 2 Registered Nurses where applicable. Treatment plan was confirmed with patient prior to administration, and educated the need to report any treatment-related symptoms      Prior to administration, when applicable, the following 8 elements of medication administration were reviewed with 2nd Registered Nurse prior to dosing: drug name, drug dose, infusion volume when prepared in a syringe, rate of administration, expiration dates and/or times, appearance and integrity of drug(s), and rate of pump for infusion. The 5 rights of medication administration have been verified.

## 2022-05-03 RX ORDER — DIPHENHYDRAMINE HYDROCHLORIDE 50 MG/ML
50 INJECTION INTRAMUSCULAR; INTRAVENOUS
Status: CANCELLED | OUTPATIENT
Start: 2022-05-05

## 2022-05-03 RX ORDER — SODIUM CHLORIDE 9 MG/ML
INJECTION, SOLUTION INTRAVENOUS CONTINUOUS
Status: CANCELLED | OUTPATIENT
Start: 2022-05-05

## 2022-05-03 RX ORDER — ONDANSETRON 2 MG/ML
8 INJECTION INTRAMUSCULAR; INTRAVENOUS
Status: CANCELLED | OUTPATIENT
Start: 2022-05-05

## 2022-05-03 RX ORDER — FAMOTIDINE 10 MG/ML
20 INJECTION, SOLUTION INTRAVENOUS
Status: CANCELLED | OUTPATIENT
Start: 2022-05-05

## 2022-05-03 RX ORDER — ACETAMINOPHEN 325 MG/1
650 TABLET ORAL
Status: CANCELLED | OUTPATIENT
Start: 2022-05-05

## 2022-05-03 RX ORDER — SODIUM CHLORIDE 9 MG/ML
5-40 INJECTION INTRAVENOUS PRN
Status: CANCELLED | OUTPATIENT
Start: 2022-05-05

## 2022-05-03 RX ORDER — EPINEPHRINE 1 MG/ML
0.3 INJECTION, SOLUTION, CONCENTRATE INTRAVENOUS PRN
Status: CANCELLED | OUTPATIENT
Start: 2022-05-05

## 2022-05-03 RX ORDER — SODIUM CHLORIDE 9 MG/ML
25 INJECTION, SOLUTION INTRAVENOUS PRN
Status: CANCELLED | OUTPATIENT
Start: 2022-05-05

## 2022-05-03 RX ORDER — MEPERIDINE HYDROCHLORIDE 25 MG/ML
12.5 INJECTION INTRAMUSCULAR; INTRAVENOUS; SUBCUTANEOUS PRN
Status: CANCELLED | OUTPATIENT
Start: 2022-05-05

## 2022-05-03 RX ORDER — ALBUTEROL SULFATE 90 UG/1
4 AEROSOL, METERED RESPIRATORY (INHALATION) PRN
Status: CANCELLED | OUTPATIENT
Start: 2022-05-05

## 2022-05-05 ENCOUNTER — HOSPITAL ENCOUNTER (OUTPATIENT)
Dept: INFUSION THERAPY | Age: 57
Discharge: HOME | End: 2022-05-05
Payer: COMMERCIAL

## 2022-05-05 ENCOUNTER — HOSPITAL ENCOUNTER (OUTPATIENT)
Dept: INFUSION THERAPY | Age: 57
End: 2022-05-05
Payer: COMMERCIAL

## 2022-05-05 VITALS
WEIGHT: 253.2 LBS | SYSTOLIC BLOOD PRESSURE: 116 MMHG | HEIGHT: 63 IN | OXYGEN SATURATION: 94 % | TEMPERATURE: 98 F | BODY MASS INDEX: 44.86 KG/M2 | DIASTOLIC BLOOD PRESSURE: 56 MMHG | HEART RATE: 83 BPM

## 2022-05-05 DIAGNOSIS — C50.312 MALIGNANT NEOPLASM OF LOWER-INNER QUADRANT OF LEFT BREAST IN FEMALE, ESTROGEN RECEPTOR NEGATIVE (HCC): Primary | ICD-10-CM

## 2022-05-05 DIAGNOSIS — Z17.1 MALIGNANT NEOPLASM OF LOWER-INNER QUADRANT OF LEFT BREAST IN FEMALE, ESTROGEN RECEPTOR NEGATIVE (HCC): Primary | ICD-10-CM

## 2022-05-05 LAB
ALBUMIN SERPL-MCNC: 4.1 GM/DL (ref 3.4–5)
ALP BLD-CCNC: 81 IU/L (ref 40–128)
ALT SERPL-CCNC: 30 U/L (ref 10–40)
ANION GAP SERPL CALCULATED.3IONS-SCNC: 11 MMOL/L (ref 4–16)
AST SERPL-CCNC: 21 IU/L (ref 15–37)
BASOPHILS ABSOLUTE: 0 K/CU MM
BASOPHILS RELATIVE PERCENT: 0.5 % (ref 0–1)
BILIRUB SERPL-MCNC: 1.1 MG/DL (ref 0–1)
BUN BLDV-MCNC: 14 MG/DL (ref 6–23)
CALCIUM SERPL-MCNC: 9 MG/DL (ref 8.3–10.6)
CHLORIDE BLD-SCNC: 101 MMOL/L (ref 99–110)
CO2: 26 MMOL/L (ref 21–32)
CREAT SERPL-MCNC: 0.4 MG/DL (ref 0.6–1.1)
DIFFERENTIAL TYPE: ABNORMAL
EOSINOPHILS ABSOLUTE: 0.1 K/CU MM
EOSINOPHILS RELATIVE PERCENT: 2.7 % (ref 0–3)
GFR AFRICAN AMERICAN: >60 ML/MIN/1.73M2
GFR NON-AFRICAN AMERICAN: >60 ML/MIN/1.73M2
GLUCOSE BLD-MCNC: 103 MG/DL (ref 70–99)
HCT VFR BLD CALC: 33.2 % (ref 37–47)
HEMOGLOBIN: 10.7 GM/DL (ref 12.5–16)
LYMPHOCYTES ABSOLUTE: 0.6 K/CU MM
LYMPHOCYTES RELATIVE PERCENT: 17.2 % (ref 24–44)
MCH RBC QN AUTO: 33.4 PG (ref 27–31)
MCHC RBC AUTO-ENTMCNC: 32.2 % (ref 32–36)
MCV RBC AUTO: 103.8 FL (ref 78–100)
MONOCYTES ABSOLUTE: 0.2 K/CU MM
MONOCYTES RELATIVE PERCENT: 6.4 % (ref 0–4)
PDW BLD-RTO: 15 % (ref 11.7–14.9)
PLATELET # BLD: 207 K/CU MM (ref 140–440)
PMV BLD AUTO: 9.5 FL (ref 7.5–11.1)
POTASSIUM SERPL-SCNC: 4 MMOL/L (ref 3.5–5.1)
RBC # BLD: 3.2 M/CU MM (ref 4.2–5.4)
SEGMENTED NEUTROPHILS ABSOLUTE COUNT: 2.7 K/CU MM
SEGMENTED NEUTROPHILS RELATIVE PERCENT: 73.2 % (ref 36–66)
SODIUM BLD-SCNC: 138 MMOL/L (ref 135–145)
TOTAL PROTEIN: 5.9 GM/DL (ref 6.4–8.2)
WBC # BLD: 3.7 K/CU MM (ref 4–10.5)

## 2022-05-05 PROCEDURE — 6360000002 HC RX W HCPCS: Performed by: INTERNAL MEDICINE

## 2022-05-05 PROCEDURE — 2500000003 HC RX 250 WO HCPCS: Performed by: INTERNAL MEDICINE

## 2022-05-05 PROCEDURE — 85025 COMPLETE CBC W/AUTO DIFF WBC: CPT

## 2022-05-05 PROCEDURE — 96413 CHEMO IV INFUSION 1 HR: CPT

## 2022-05-05 PROCEDURE — 96375 TX/PRO/DX INJ NEW DRUG ADDON: CPT

## 2022-05-05 PROCEDURE — 96367 TX/PROPH/DG ADDL SEQ IV INF: CPT

## 2022-05-05 PROCEDURE — 80053 COMPREHEN METABOLIC PANEL: CPT

## 2022-05-05 PROCEDURE — 2580000003 HC RX 258: Performed by: INTERNAL MEDICINE

## 2022-05-05 RX ORDER — HEPARIN SODIUM (PORCINE) LOCK FLUSH IV SOLN 100 UNIT/ML 100 UNIT/ML
500 SOLUTION INTRAVENOUS PRN
Status: DISCONTINUED | OUTPATIENT
Start: 2022-05-05 | End: 2022-05-06 | Stop reason: HOSPADM

## 2022-05-05 RX ORDER — FAMOTIDINE 10 MG/ML
20 INJECTION, SOLUTION INTRAVENOUS ONCE
Status: COMPLETED | OUTPATIENT
Start: 2022-05-05 | End: 2022-05-05

## 2022-05-05 RX ORDER — DIPHENHYDRAMINE HYDROCHLORIDE 50 MG/ML
50 INJECTION INTRAMUSCULAR; INTRAVENOUS ONCE
Status: COMPLETED | OUTPATIENT
Start: 2022-05-05 | End: 2022-05-05

## 2022-05-05 RX ORDER — SODIUM CHLORIDE 0.9 % (FLUSH) 0.9 %
5-40 SYRINGE (ML) INJECTION PRN
Status: DISCONTINUED | OUTPATIENT
Start: 2022-05-05 | End: 2022-05-06 | Stop reason: HOSPADM

## 2022-05-05 RX ORDER — SODIUM CHLORIDE 9 MG/ML
20 INJECTION, SOLUTION INTRAVENOUS ONCE
Status: DISCONTINUED | OUTPATIENT
Start: 2022-05-05 | End: 2022-05-06 | Stop reason: HOSPADM

## 2022-05-05 RX ADMIN — FAMOTIDINE 20 MG: 10 INJECTION, SOLUTION INTRAVENOUS at 10:35

## 2022-05-05 RX ADMIN — HEPARIN 500 UNITS: 100 SYRINGE at 12:15

## 2022-05-05 RX ADMIN — DEXAMETHASONE SODIUM PHOSPHATE 10 MG: 10 INJECTION INTRAMUSCULAR; INTRAVENOUS at 10:38

## 2022-05-05 RX ADMIN — SODIUM CHLORIDE 20 ML/HR: 9 INJECTION, SOLUTION INTRAVENOUS at 10:38

## 2022-05-05 RX ADMIN — PACLITAXEL 162 MG: 6 INJECTION, SOLUTION, CONCENTRATE INTRAVENOUS at 11:06

## 2022-05-05 RX ADMIN — DIPHENHYDRAMINE HYDROCHLORIDE 50 MG: 50 INJECTION INTRAMUSCULAR; INTRAVENOUS at 10:35

## 2022-05-05 NOTE — PROGRESS NOTES
Ambulated to infusion area, here today for chemotherapy. Patient reports feeling tired, and occasional tenderness on tongue. No other concerns. Right chest mediport accessed, positive blood return noted. Labs drawn. Labs within defined limits. Chemo administered as ordered. Call light within reach. Tolerated infusion without incident. Discharge instructions provided. Patient RTC 05/12 for next infusion and OV with Dr. Gerald Zaragoza. Status appropriately assessed and documented. All required labs and results reviewed. Treatment approved by provider. Treatment orders and medications verified by 2 Registered Nurses where applicable. Treatment plan was confirmed with patient prior to administration, and educated the need to report any treatment-related symptoms. Prior to administration, when applicable, the following 8 elements of medication administration were reviewed with 2nd Registered Nurse prior to dosing: drug name, drug dose, infusion volume when prepared in a syringe, rate of administration, expiration dates and/or times, appearance and integrity of drug(s), and rate of pump for infusion. The 5 rights of medication administration have been verified.

## 2022-05-11 RX ORDER — FAMOTIDINE 10 MG/ML
20 INJECTION, SOLUTION INTRAVENOUS
Status: CANCELLED | OUTPATIENT
Start: 2022-05-12

## 2022-05-11 RX ORDER — DIPHENHYDRAMINE HYDROCHLORIDE 50 MG/ML
50 INJECTION INTRAMUSCULAR; INTRAVENOUS ONCE
Status: CANCELLED | OUTPATIENT
Start: 2022-05-12

## 2022-05-11 RX ORDER — SODIUM CHLORIDE 9 MG/ML
20 INJECTION, SOLUTION INTRAVENOUS ONCE
Status: CANCELLED | OUTPATIENT
Start: 2022-05-12 | End: 2022-05-12

## 2022-05-11 RX ORDER — SODIUM CHLORIDE 9 MG/ML
INJECTION, SOLUTION INTRAVENOUS CONTINUOUS
Status: CANCELLED | OUTPATIENT
Start: 2022-05-12

## 2022-05-11 RX ORDER — FAMOTIDINE 10 MG/ML
20 INJECTION, SOLUTION INTRAVENOUS ONCE
Status: CANCELLED | OUTPATIENT
Start: 2022-05-12

## 2022-05-11 RX ORDER — ONDANSETRON 2 MG/ML
8 INJECTION INTRAMUSCULAR; INTRAVENOUS
Status: CANCELLED | OUTPATIENT
Start: 2022-05-12

## 2022-05-11 RX ORDER — HEPARIN SODIUM (PORCINE) LOCK FLUSH IV SOLN 100 UNIT/ML 100 UNIT/ML
500 SOLUTION INTRAVENOUS PRN
Status: CANCELLED | OUTPATIENT
Start: 2022-05-12

## 2022-05-11 RX ORDER — SODIUM CHLORIDE 0.9 % (FLUSH) 0.9 %
5-40 SYRINGE (ML) INJECTION PRN
Status: CANCELLED | OUTPATIENT
Start: 2022-05-12

## 2022-05-11 RX ORDER — SODIUM CHLORIDE 9 MG/ML
5-40 INJECTION INTRAVENOUS PRN
Status: CANCELLED | OUTPATIENT
Start: 2022-05-12

## 2022-05-11 RX ORDER — ACETAMINOPHEN 325 MG/1
650 TABLET ORAL
Status: CANCELLED | OUTPATIENT
Start: 2022-05-12

## 2022-05-11 RX ORDER — SODIUM CHLORIDE 9 MG/ML
25 INJECTION, SOLUTION INTRAVENOUS PRN
Status: CANCELLED | OUTPATIENT
Start: 2022-05-12

## 2022-05-11 RX ORDER — MEPERIDINE HYDROCHLORIDE 50 MG/ML
12.5 INJECTION INTRAMUSCULAR; INTRAVENOUS; SUBCUTANEOUS PRN
Status: CANCELLED | OUTPATIENT
Start: 2022-05-12

## 2022-05-11 RX ORDER — DIPHENHYDRAMINE HYDROCHLORIDE 50 MG/ML
50 INJECTION INTRAMUSCULAR; INTRAVENOUS
Status: CANCELLED | OUTPATIENT
Start: 2022-05-12

## 2022-05-11 RX ORDER — ALBUTEROL SULFATE 90 UG/1
4 AEROSOL, METERED RESPIRATORY (INHALATION) PRN
Status: CANCELLED | OUTPATIENT
Start: 2022-05-12

## 2022-05-11 RX ORDER — EPINEPHRINE 1 MG/ML
0.3 INJECTION, SOLUTION, CONCENTRATE INTRAVENOUS PRN
Status: CANCELLED | OUTPATIENT
Start: 2022-05-12

## 2022-05-12 ENCOUNTER — OFFICE VISIT (OUTPATIENT)
Dept: ONCOLOGY | Age: 57
End: 2022-05-12
Payer: COMMERCIAL

## 2022-05-12 ENCOUNTER — HOSPITAL ENCOUNTER (OUTPATIENT)
Dept: INFUSION THERAPY | Age: 57
Discharge: HOME | End: 2022-05-12
Payer: COMMERCIAL

## 2022-05-12 VITALS
HEIGHT: 63 IN | SYSTOLIC BLOOD PRESSURE: 114 MMHG | OXYGEN SATURATION: 96 % | HEART RATE: 73 BPM | DIASTOLIC BLOOD PRESSURE: 75 MMHG | WEIGHT: 253 LBS | TEMPERATURE: 96.6 F | BODY MASS INDEX: 44.83 KG/M2

## 2022-05-12 DIAGNOSIS — R91.8 LUNG NODULES: ICD-10-CM

## 2022-05-12 DIAGNOSIS — Z17.1 MALIGNANT NEOPLASM OF LOWER-INNER QUADRANT OF LEFT BREAST IN FEMALE, ESTROGEN RECEPTOR NEGATIVE (HCC): Primary | ICD-10-CM

## 2022-05-12 DIAGNOSIS — R74.8 ELEVATED LIVER ENZYMES: ICD-10-CM

## 2022-05-12 DIAGNOSIS — R06.00 DYSPNEA, UNSPECIFIED TYPE: ICD-10-CM

## 2022-05-12 DIAGNOSIS — C50.312 MALIGNANT NEOPLASM OF LOWER-INNER QUADRANT OF LEFT BREAST IN FEMALE, ESTROGEN RECEPTOR NEGATIVE (HCC): Primary | ICD-10-CM

## 2022-05-12 LAB
BASOPHILS ABSOLUTE: 0 K/CU MM
BASOPHILS RELATIVE PERCENT: 0.8 % (ref 0–1)
DIFFERENTIAL TYPE: ABNORMAL
EOSINOPHILS ABSOLUTE: 0.1 K/CU MM
EOSINOPHILS RELATIVE PERCENT: 2.8 % (ref 0–3)
HCT VFR BLD CALC: 31.5 % (ref 37–47)
HEMOGLOBIN: 9.9 GM/DL (ref 12.5–16)
LYMPHOCYTES ABSOLUTE: 0.7 K/CU MM
LYMPHOCYTES RELATIVE PERCENT: 19.9 % (ref 24–44)
MCH RBC QN AUTO: 33.1 PG (ref 27–31)
MCHC RBC AUTO-ENTMCNC: 31.4 % (ref 32–36)
MCV RBC AUTO: 105.4 FL (ref 78–100)
MONOCYTES ABSOLUTE: 0.3 K/CU MM
MONOCYTES RELATIVE PERCENT: 9 % (ref 0–4)
PDW BLD-RTO: 15.1 % (ref 11.7–14.9)
PLATELET # BLD: 206 K/CU MM (ref 140–440)
PMV BLD AUTO: 9.5 FL (ref 7.5–11.1)
RBC # BLD: 2.99 M/CU MM (ref 4.2–5.4)
SEGMENTED NEUTROPHILS ABSOLUTE COUNT: 2.4 K/CU MM
SEGMENTED NEUTROPHILS RELATIVE PERCENT: 67.5 % (ref 36–66)
WBC # BLD: 3.6 K/CU MM (ref 4–10.5)

## 2022-05-12 PROCEDURE — 99214 OFFICE O/P EST MOD 30 MIN: CPT | Performed by: INTERNAL MEDICINE

## 2022-05-12 PROCEDURE — 96375 TX/PRO/DX INJ NEW DRUG ADDON: CPT

## 2022-05-12 PROCEDURE — 96374 THER/PROPH/DIAG INJ IV PUSH: CPT

## 2022-05-12 PROCEDURE — 6360000002 HC RX W HCPCS: Performed by: INTERNAL MEDICINE

## 2022-05-12 PROCEDURE — 96417 CHEMO IV INFUS EACH ADDL SEQ: CPT

## 2022-05-12 PROCEDURE — 2500000003 HC RX 250 WO HCPCS: Performed by: INTERNAL MEDICINE

## 2022-05-12 PROCEDURE — 96413 CHEMO IV INFUSION 1 HR: CPT

## 2022-05-12 PROCEDURE — 85025 COMPLETE CBC W/AUTO DIFF WBC: CPT

## 2022-05-12 PROCEDURE — 2580000003 HC RX 258: Performed by: INTERNAL MEDICINE

## 2022-05-12 RX ORDER — FAMOTIDINE 10 MG/ML
20 INJECTION, SOLUTION INTRAVENOUS ONCE
Status: COMPLETED | OUTPATIENT
Start: 2022-05-12 | End: 2022-05-12

## 2022-05-12 RX ORDER — DIPHENHYDRAMINE HYDROCHLORIDE 50 MG/ML
50 INJECTION INTRAMUSCULAR; INTRAVENOUS ONCE
Status: COMPLETED | OUTPATIENT
Start: 2022-05-12 | End: 2022-05-12

## 2022-05-12 RX ORDER — SODIUM CHLORIDE 9 MG/ML
20 INJECTION, SOLUTION INTRAVENOUS ONCE
Status: DISCONTINUED | OUTPATIENT
Start: 2022-05-12 | End: 2022-05-13 | Stop reason: HOSPADM

## 2022-05-12 RX ORDER — HEPARIN SODIUM (PORCINE) LOCK FLUSH IV SOLN 100 UNIT/ML 100 UNIT/ML
500 SOLUTION INTRAVENOUS PRN
Status: DISCONTINUED | OUTPATIENT
Start: 2022-05-12 | End: 2022-05-13 | Stop reason: HOSPADM

## 2022-05-12 RX ADMIN — FAMOTIDINE 20 MG: 10 INJECTION, SOLUTION INTRAVENOUS at 10:54

## 2022-05-12 RX ADMIN — PACLITAXEL 162 MG: 6 INJECTION, SOLUTION, CONCENTRATE INTRAVENOUS at 10:59

## 2022-05-12 RX ADMIN — SODIUM CHLORIDE 20 ML/HR: 9 INJECTION, SOLUTION INTRAVENOUS at 10:17

## 2022-05-12 RX ADMIN — DEXAMETHASONE SODIUM PHOSPHATE 10 MG: 10 INJECTION INTRAMUSCULAR; INTRAVENOUS at 10:18

## 2022-05-12 RX ADMIN — DIPHENHYDRAMINE HYDROCHLORIDE 50 MG: 50 INJECTION INTRAMUSCULAR; INTRAVENOUS at 10:54

## 2022-05-12 NOTE — PROGRESS NOTES
Ambulated to infusion area for treatment. Mediport accessed per protocol. Blood return noted, flushes easily. Labs drawn as ordered. Pt assessed. States neuropathy is the same, right foot is worse than left, and frustrated with weight gain. Pt plans to discuss this during office visit with Dr. Catie Gan today. Ambulated to treatment room for office visit with Dr. Catie Gan. Returned to infusion area for treatment. Treatment given as ordered. Tolerated well. AVS given. Discharge in stable condition.   Rodney Anderson RN

## 2022-05-12 NOTE — PROGRESS NOTES
Patient Name:  Rose Westbrook  Patient :  1965  Patient MRN:  [id number]     Primary Oncologist: Daniel Can MD  Referring Provider: Machelle Hernandez PA-C     Date of Service: 2022      Reason for Consult:  TNBC     Chief Complaint:    Chief Complaint   Patient presents with    Follow-up       Encounter Diagnoses   Name Primary?  Malignant neoplasm of lower-inner quadrant of left breast in female, estrogen receptor negative (HCC) Yes    Lung nodules     Elevated liver enzymes     Dyspnea, unspecified type         HPI:   2021: She had with her  to the clinic today. Reported that she was found to have a mass in the left breast on routine mammogram.  Denied any axial lymphadenopathy. Denied any weight loss. Denied any new bone pains. Denied any focal weakness. Reported blurred vision, needs new glasses. Headaches lately. But reported that she recently quit drinking alcohol. Denied any chest pain or increase shortness of breath. Denied any abdominal pain, nausea vomiting, diarrhea, constipation    21: Mammogram:  Recommend spot gene synthesis compression views and possible ultrasound for   focal asymmetry in the left upper inner breast, middle depth.       BIRADS:   BIRADS - CATEGORY 0       Incomplete: Needs Additional Imaging Evaluation     11/10/21: Mammogram:  0.7 x 0.8 x 0.7 cm suspicious mass left breast 8 o'clock, about 3-4 cm from   the left nipple.  This likely correlates with the mammographic finding.    Ultrasound-guided biopsy is advised.       Recommend post biopsy mammographic correlation to confirm that it correlates   with the mammographic finding.       These results and recommendations were discussed with the patient by Dr. Yanet Deng will be assisted to schedule the recommended biopsy by the breast   nurse navigator.  An order will be requested from her referring physician.       BIRADS:   BIRADS - CATEGORY 4B       Intermediate suspicion for malignancy.       Suspicious Abnormality. Biopsy should be considered at this time. 12/10/21:Final Pathologic Diagnosis:   Breast, left, 8 o'clock, ultrasound guided needle core   biopsy:   -  INVASIVE DUCTAL CARCINOMA (see comment, see stains   report). BREAST INVASIVE CARCINOMA SUMMARY - CORE BIOPSY   Procedure:  Ultrasound guided needle core biopsy. Laterality and tumor site:  Left breast at 8 o'clock. Tumor size:  7 mm. in greatest dimension within biopsy   material present. Histologic type:  Invasive ductal carcinoma.     Histologic Grade (Jennifer): Grade 2,        -Tubular score 3, Nuclear score 3, Mitosis score 1 (7   /10 hpf). Ductal Carcinoma In Situ (DCIS):  Not present. Lobular Carcinoma In Situ (LCIS):  Not present.     Lympho-vascular invasion:  Not identified.     Microcalcifications:  Present.     Additional Findings:  None.     Ancillary studies: ER/PgR/Her2 results from the current   biopsy are as follows:   -ER by [facility]*: Negative (0% staining)   -PgR by IHC*: Negative (0% staining)   -Her2 by IHC*: Negative (Score 0)   -Her2 by FISH*:Negative   -Avg. #HER2 signals/nucleus: 1.8, Avg. #CEP17   signals/nucleus: 1.72, HER2/CEP17 ratio: 1.05      12/16/2021 CBC WBC of 4.8 hemoglobin of 12.6 hematocrit of 39.6 MCV of 100.5 and platelets of 356. Sodium of 138 potassium 4.6 BUN of 11 creatinine 0.4. LFTs with alk phos 155 ALT 54    12/23/2021 CT scan of the abdomen pelvis with IV contrast revealed couple of noncalcified micro nodules in the lower lobe of the right lung.     1/10/22: PORT placed    1/17/22: PET:  Approximate 1.1 cm mass of the left lower inner breast with minimal focal FDG   avidity which may correspond to the known primary. Aki lE is no evidence of   metastatic disease.         C1D1 neoadjuvant AC started 1/20/22    3/31/22: C1D1 taxol    Past Medical History:     Hyperlipidemia, hypertension, depression                                                           Past Surgery History: Total abdominal hysterectomy, bilateral salpingo-oophorectomy                                                                          Social History:   Lives with a partner. Has 2 children 22 and 35, 2 daughters. Quit smoking about 25 years ago prior to which she smoked 1 pack/day for 7 years. Quit drinking alcohol on 12/2/2021 prior to which she consumed 4-5 beers per day and also whiskey sometimes. Currently on sick leave, was an  at [employer]. Family History:    Father diagnosed with prostate cancer                                                                                               No Known Allergies    Current Outpatient Medications on File Prior to Visit   Medication Sig Dispense Refill    lidocaine-prilocaine (EMLA) 2.5-2.5 % cream Apply topically to [facility] site 30-60 minutes prior to treatments. 30 g 1    dexamethasone (DECADRON) 4 MG tablet Two tablets (8 mg) po day before chemotherapy with taxol 2 tablet 0    dexamethasone (DECADRON) 2 MG tablet Take 1 tablet by mouth daily (with breakfast) for two days AFTER chemotherapy. Take 8 mg (4 tabs) the night before 1st chemo ONLY.  28 tablet 0    Cholecalciferol (VITAMIN D3 PO) Take 1 tablet by mouth daily      Ginger, Zingiber officinalis, (GINGER PO) Take 1 tablet by mouth daily      OLANZapine (ZYPREXA) 5 MG tablet One tablet po HS for four nights starting the night before chemotherapy 16 tablet 0    ondansetron (ZOFRAN) 8 MG tablet Take 1 tablet by mouth every 8 hours as needed for Nausea or Vomiting 30 tablet 1    FLUoxetine (PROZAC) 20 MG capsule Take 20 mg by mouth daily      ibuprofen (ADVIL;MOTRIN) 600 MG tablet Take 600 mg by mouth every 6 hours as needed for Pain      ATORVASTATIN CALCIUM PO Take by mouth      lisinopril (PRINIVIL;ZESTRIL) 20 MG tablet TAKE ONE TABLET BY MOUTH DAILY 90 tablet 1    acyclovir (ZOVIRAX) 400 MG tablet Take 1 tablet by mouth daily 90 tablet 1     No current facility-administered medications on file prior to visit. Interval history:5/12/22: She arrived alone to the clinic today. Back pain intense at times. Standing on her feet all the time. Neuropathy in the right foot, is not worse. No falls. No other neuropathy. No rash. No bleeding. No chest pain. SOB is the same. No abdominal pain. No nausea or any emesis. No  sx. Has gained weight. No LE edema. Review of Systems:  As per internal history     Vital Signs: /75 (Position: Sitting, Cuff Size: Large Adult)   Pulse 73   Temp 96.6 °F (35.9 °C) (Temporal)   Ht 5' 3\" (1.6 m)   Wt 253 lb (114.8 kg)   SpO2 96%   BMI 44.82 kg/m²     CONSTITUTIONAL: awake, alert, obese  EYES: IGNACIO, No pallor or any icterus  ENT: ATNC  NECK: No JVD, port in the right side, slight edema and ttp. Mild erythema. HEMATOLOGIC/LYMPHATIC: no cervical, supraclavicular or axillary lymphadenopathy   LUNGS: CTAB  CARDIOVASCULAR: s1s2 rrr no murmurs  ABDOMEN: soft ntnd bs pos  NEUROLOGIC: GI  SKIN: No rash  EXTREMITIES: no LE edema bilaterally  Breast, did not really appreciate left breast mass, did not appreciate any axillary lymphadenopathy.   No masses in the right breast or right axillary lymphadenopathy     Labs:  Hematology:  Lab Results   Component Value Date    WBC 3.6 (L) 05/12/2022    RBC 2.99 (L) 05/12/2022    HGB 9.9 (L) 05/12/2022    HCT 31.5 (L) 05/12/2022    .4 (H) 05/12/2022    MCH 33.1 (H) 05/12/2022    MCHC 31.4 (L) 05/12/2022    RDW 15.1 (H) 05/12/2022     05/12/2022    MPV 9.5 05/12/2022    SEGSPCT 67.5 (H) 05/12/2022    EOSRELPCT 2.8 05/12/2022    BASOPCT 0.8 05/12/2022    LYMPHOPCT 19.9 (L) 05/12/2022    MONOPCT 9.0 (H) 05/12/2022    SEGSABS 2.4 05/12/2022    EOSABS 0.1 05/12/2022    BASOSABS 0.0 05/12/2022    LYMPHSABS 0.7 05/12/2022    MONOSABS 0.3 05/12/2022    DIFFTYPE AUTOMATED DIFFERENTIAL 05/12/2022 No results found for: ESR  Chemistry:  Lab Results   Component Value Date     05/05/2022    K 4.0 05/05/2022     05/05/2022    CO2 26 05/05/2022    BUN 14 05/05/2022    CREATININE 0.4 (L) 05/05/2022    GLUCOSE 103 (H) 05/05/2022    CALCIUM 9.0 05/05/2022    PROT 5.9 (L) 05/05/2022    LABALBU 4.1 05/05/2022    BILITOT 1.1 (H) 05/05/2022    ALKPHOS 81 05/05/2022    AST 21 05/05/2022    ALT 30 05/05/2022    LABGLOM >60 05/05/2022    GFRAA >60 05/05/2022    AGRATIO 1.7 10/11/2016    GLOB 2.4 10/11/2016     No results found for: MMA, LDH, HOMOCYSTEINE  No components found for: LD  Lab Results   Component Value Date    TSHHS 1.650 02/24/2021    T4FREE 1.04 02/24/2021    FT3 2.8 02/24/2021     Immunology:  Lab Results   Component Value Date    PROT 5.9 (L) 05/05/2022     No results found for: Santi Mata, KLFLCR  No results found for: B2M  Coagulation Panel:  No results found for: PROTIME, INR, APTT, DDIMER  Anemia Panel:  No results found for: [id number], FOLATE  Tumor Markers:  Lab Results   Component Value Date    LABCA2 29.6 01/19/2022        Observations:  ECOG:  No data recorded       Assessment & Plan:                                                          Triple negative breast cancer,biopsy on 12/1/2021,  left, 1.1 cm, grade 2, no evidence of any axillary  lymphadenopathy per available imaging studies. PET scan jan 2022 with no other met disease. Ca 27-29 was 29.6 in jan 2021 . Discussed the findings, diagnosis, possible staging and recommended neoadjuvant chemotherapy with AC followed by T followed by surgical resection followed by radiation pending surgical modality(Note genetic testing negative, Pt yet undecided). Discussed the adverse effects of doxorubicin, cyclophosphamide and Taxol. Answered all questions. PORT completed and OCM completed. Echocardiogram jan 12 2022 with preserved EF.   C1D1 AC started jan 20 2022.   Completed march 3 2022   Dose adjustments were made according to adverse effects and cytopenias. Started Neoadjuvant Taxol march 31/2022, will monitor for adverse effects. LIANET MSH6 [id number]  Will contact surgeon, She is still deciding regarding the modality of surgery  Will follow per NCCN guidelines after completion of all the treatments    NC anemia: Most probably sec to chemotherapy. Will monitor for now. Transfusion support as needed    Neutropenia: is  most probably sec to chemotherapy. Dose adjustments are being made    Recommend complete cessation of ETOH, weight loss. Constipation: recommended stool softener and laxatives as needed. ?Seasonal allergies: Recommended trial of claritin    Neuropathy: Intermittent and mild and chronic. Note h/o ETOH. Will monitor for now. Defers pharmacological intervention at this point    Continue other medical care. Thank you for letting us be part of the care and will follow along. Discussed above findings and plan with her and she voiced understanding. Answered all her questions. Discussed healthy lifestyle including healthy diet, regular exercise as tolerated. Also discussed importance of being up-to-date with age-appropriate screening tools. Quit smoking and taking THC  Recommend ETOH cessation    Recommend follow-up with primary care physician and other specialists. Please do not hesitate to contact us if you need further information. Return to clinic June 2 2022  or earlier if new Sx    I have recommended that the patient follow CDC guidelines for prevention of COVID-19 infection. Received COVID vaccine, booster not yet. No Flu vaccine either, never taken.      [address]

## 2022-05-12 NOTE — PROGRESS NOTES
MA Rooming Questions  Patient: Jeremías Valverde  MRN: [id number]    Date: 5/12/2022        1. Do you have any new issues?   no         2. Do you need any refills on medications?    no    3. Have you had any imaging done since your last visit? yes - today     4. Have you been hospitalized or seen in the emergency room since your last visit here?   no    5. Did the patient have a depression screening completed today?  No    No data recorded     PHQ-9 Given to (if applicable):               PHQ-9 Score (if applicable):                     [] Positive     []  Negative              Does question #9 need addressed (if applicable)                     [] Yes    []  No               Rhea Ly CMA

## 2022-05-18 RX ORDER — ONDANSETRON 2 MG/ML
8 INJECTION INTRAMUSCULAR; INTRAVENOUS
Status: CANCELLED | OUTPATIENT
Start: 2022-05-19

## 2022-05-18 RX ORDER — FAMOTIDINE 10 MG/ML
20 INJECTION, SOLUTION INTRAVENOUS
Status: CANCELLED | OUTPATIENT
Start: 2022-05-19

## 2022-05-18 RX ORDER — SODIUM CHLORIDE 9 MG/ML
5-40 INJECTION INTRAVENOUS PRN
Status: CANCELLED | OUTPATIENT
Start: 2022-05-19

## 2022-05-18 RX ORDER — MEPERIDINE HYDROCHLORIDE 25 MG/ML
12.5 INJECTION INTRAMUSCULAR; INTRAVENOUS; SUBCUTANEOUS PRN
Status: CANCELLED | OUTPATIENT
Start: 2022-05-19

## 2022-05-18 RX ORDER — SODIUM CHLORIDE 9 MG/ML
25 INJECTION, SOLUTION INTRAVENOUS PRN
Status: CANCELLED | OUTPATIENT
Start: 2022-05-19

## 2022-05-18 RX ORDER — ALBUTEROL SULFATE 90 UG/1
4 AEROSOL, METERED RESPIRATORY (INHALATION) PRN
Status: CANCELLED | OUTPATIENT
Start: 2022-05-19

## 2022-05-18 RX ORDER — EPINEPHRINE 1 MG/ML
0.3 INJECTION, SOLUTION, CONCENTRATE INTRAVENOUS PRN
Status: CANCELLED | OUTPATIENT
Start: 2022-05-19

## 2022-05-18 RX ORDER — SODIUM CHLORIDE 9 MG/ML
INJECTION, SOLUTION INTRAVENOUS CONTINUOUS
Status: CANCELLED | OUTPATIENT
Start: 2022-05-19

## 2022-05-18 RX ORDER — ACETAMINOPHEN 325 MG/1
650 TABLET ORAL
Status: CANCELLED | OUTPATIENT
Start: 2022-05-19

## 2022-05-18 RX ORDER — DIPHENHYDRAMINE HYDROCHLORIDE 50 MG/ML
50 INJECTION INTRAMUSCULAR; INTRAVENOUS
Status: CANCELLED | OUTPATIENT
Start: 2022-05-19

## 2022-05-19 ENCOUNTER — HOSPITAL ENCOUNTER (OUTPATIENT)
Dept: INFUSION THERAPY | Age: 57
Discharge: HOME | End: 2022-05-19
Payer: COMMERCIAL

## 2022-05-19 VITALS
HEIGHT: 63 IN | RESPIRATION RATE: 16 BRPM | WEIGHT: 256.3 LBS | BODY MASS INDEX: 45.41 KG/M2 | SYSTOLIC BLOOD PRESSURE: 130 MMHG | HEART RATE: 86 BPM | OXYGEN SATURATION: 96 % | DIASTOLIC BLOOD PRESSURE: 79 MMHG | TEMPERATURE: 97.3 F

## 2022-05-19 DIAGNOSIS — C50.312 MALIGNANT NEOPLASM OF LOWER-INNER QUADRANT OF LEFT BREAST IN FEMALE, ESTROGEN RECEPTOR NEGATIVE (HCC): Primary | ICD-10-CM

## 2022-05-19 DIAGNOSIS — Z17.1 MALIGNANT NEOPLASM OF LOWER-INNER QUADRANT OF LEFT BREAST IN FEMALE, ESTROGEN RECEPTOR NEGATIVE (HCC): Primary | ICD-10-CM

## 2022-05-19 LAB
ALBUMIN SERPL-MCNC: 4 GM/DL (ref 3.4–5)
ALP BLD-CCNC: 77 IU/L (ref 40–128)
ALT SERPL-CCNC: 24 U/L (ref 10–40)
ANION GAP SERPL CALCULATED.3IONS-SCNC: 10 MMOL/L (ref 4–16)
AST SERPL-CCNC: 16 IU/L (ref 15–37)
BASOPHILS ABSOLUTE: 0 K/CU MM
BASOPHILS RELATIVE PERCENT: 0.5 % (ref 0–1)
BILIRUB SERPL-MCNC: 0.7 MG/DL (ref 0–1)
BUN BLDV-MCNC: 15 MG/DL (ref 6–23)
CALCIUM SERPL-MCNC: 9 MG/DL (ref 8.3–10.6)
CHLORIDE BLD-SCNC: 102 MMOL/L (ref 99–110)
CO2: 27 MMOL/L (ref 21–32)
CREAT SERPL-MCNC: 0.4 MG/DL (ref 0.6–1.1)
DIFFERENTIAL TYPE: ABNORMAL
EOSINOPHILS ABSOLUTE: 0.1 K/CU MM
EOSINOPHILS RELATIVE PERCENT: 2.3 % (ref 0–3)
GFR AFRICAN AMERICAN: >60 ML/MIN/1.73M2
GFR NON-AFRICAN AMERICAN: >60 ML/MIN/1.73M2
GLUCOSE BLD-MCNC: 109 MG/DL (ref 70–99)
HCT VFR BLD CALC: 32.2 % (ref 37–47)
HEMOGLOBIN: 10.3 GM/DL (ref 12.5–16)
LYMPHOCYTES ABSOLUTE: 0.7 K/CU MM
LYMPHOCYTES RELATIVE PERCENT: 16.5 % (ref 24–44)
MCH RBC QN AUTO: 33.2 PG (ref 27–31)
MCHC RBC AUTO-ENTMCNC: 32 % (ref 32–36)
MCV RBC AUTO: 103.9 FL (ref 78–100)
MONOCYTES ABSOLUTE: 0.3 K/CU MM
MONOCYTES RELATIVE PERCENT: 6.7 % (ref 0–4)
PDW BLD-RTO: 14.9 % (ref 11.7–14.9)
PLATELET # BLD: 229 K/CU MM (ref 140–440)
PMV BLD AUTO: 9.3 FL (ref 7.5–11.1)
POTASSIUM SERPL-SCNC: 3.9 MMOL/L (ref 3.5–5.1)
RBC # BLD: 3.1 M/CU MM (ref 4.2–5.4)
SEGMENTED NEUTROPHILS ABSOLUTE COUNT: 3.2 K/CU MM
SEGMENTED NEUTROPHILS RELATIVE PERCENT: 74 % (ref 36–66)
SODIUM BLD-SCNC: 139 MMOL/L (ref 135–145)
TOTAL PROTEIN: 5.8 GM/DL (ref 6.4–8.2)
WBC # BLD: 4.3 K/CU MM (ref 4–10.5)

## 2022-05-19 PROCEDURE — 2500000003 HC RX 250 WO HCPCS: Performed by: NURSE PRACTITIONER

## 2022-05-19 PROCEDURE — 96367 TX/PROPH/DG ADDL SEQ IV INF: CPT

## 2022-05-19 PROCEDURE — 2580000003 HC RX 258: Performed by: NURSE PRACTITIONER

## 2022-05-19 PROCEDURE — 96375 TX/PRO/DX INJ NEW DRUG ADDON: CPT

## 2022-05-19 PROCEDURE — 85025 COMPLETE CBC W/AUTO DIFF WBC: CPT

## 2022-05-19 PROCEDURE — 6360000002 HC RX W HCPCS: Performed by: NURSE PRACTITIONER

## 2022-05-19 PROCEDURE — 80053 COMPREHEN METABOLIC PANEL: CPT

## 2022-05-19 PROCEDURE — 96413 CHEMO IV INFUSION 1 HR: CPT

## 2022-05-19 RX ORDER — DIPHENHYDRAMINE HYDROCHLORIDE 50 MG/ML
50 INJECTION INTRAMUSCULAR; INTRAVENOUS ONCE
Status: COMPLETED | OUTPATIENT
Start: 2022-05-19 | End: 2022-05-19

## 2022-05-19 RX ORDER — SODIUM CHLORIDE 0.9 % (FLUSH) 0.9 %
5-40 SYRINGE (ML) INJECTION PRN
Status: DISCONTINUED | OUTPATIENT
Start: 2022-05-19 | End: 2022-05-20 | Stop reason: HOSPADM

## 2022-05-19 RX ORDER — SODIUM CHLORIDE 9 MG/ML
20 INJECTION, SOLUTION INTRAVENOUS ONCE
Status: DISCONTINUED | OUTPATIENT
Start: 2022-05-19 | End: 2022-05-20 | Stop reason: HOSPADM

## 2022-05-19 RX ORDER — FAMOTIDINE 10 MG/ML
20 INJECTION, SOLUTION INTRAVENOUS ONCE
Status: COMPLETED | OUTPATIENT
Start: 2022-05-19 | End: 2022-05-19

## 2022-05-19 RX ORDER — HEPARIN SODIUM (PORCINE) LOCK FLUSH IV SOLN 100 UNIT/ML 100 UNIT/ML
500 SOLUTION INTRAVENOUS PRN
Status: DISCONTINUED | OUTPATIENT
Start: 2022-05-19 | End: 2022-05-20 | Stop reason: HOSPADM

## 2022-05-19 RX ADMIN — FAMOTIDINE 20 MG: 10 INJECTION, SOLUTION INTRAVENOUS at 09:45

## 2022-05-19 RX ADMIN — SODIUM CHLORIDE, PRESERVATIVE FREE 10 ML: 5 INJECTION INTRAVENOUS at 11:26

## 2022-05-19 RX ADMIN — DEXAMETHASONE SODIUM PHOSPHATE 10 MG: 10 INJECTION INTRAMUSCULAR; INTRAVENOUS at 09:50

## 2022-05-19 RX ADMIN — PACLITAXEL 144 MG: 6 INJECTION, SOLUTION INTRAVENOUS at 10:14

## 2022-05-19 RX ADMIN — SODIUM CHLORIDE 20 ML/HR: 9 INJECTION, SOLUTION INTRAVENOUS at 09:45

## 2022-05-19 RX ADMIN — DIPHENHYDRAMINE HYDROCHLORIDE 50 MG: 50 INJECTION INTRAMUSCULAR; INTRAVENOUS at 09:45

## 2022-05-19 RX ADMIN — HEPARIN 500 UNITS: 100 SYRINGE at 11:26

## 2022-05-19 NOTE — PROGRESS NOTES
Pt. Here for treatment. Right upper chest mediport accessed without difficulty, good blood return noted. Lab work drawn as ordered. Pt. Verbalized that the bottom of both feet are tingling and a little worse than they have been. Spoke with Dario Walls NP regarding this and she stated to decrease dose by 10%, informed pt. And she verbalized understanding. Treatment administered without incident. Discharge AVS given. Patient's status assessed and documented appropriately. All labs and required results were also reviewed today. Treatment parameters have been reviewed. Today's treatment has been approved by the provider. Treatment orders and medication sequencing (when applicable) was verified by 2 registered nurses. The treatment plan was confirmed with the patient prior to administration, and the patient understands the need to report any treatment-related symptoms. Prior to administration, when applicable, the following 8 elements of medication administration were reviewed with 2nd Registered Nurse prior to dosing: drug name, drug dose, infusion volume when prepared in a syringe, rate of administration, expiration dates and/or times, appearance and integrity of drug(s), and rate of pump for infusion. The 5 rights of medication administration have been verified.

## 2022-05-25 RX ORDER — EPINEPHRINE 1 MG/ML
0.3 INJECTION, SOLUTION, CONCENTRATE INTRAVENOUS PRN
Status: CANCELLED | OUTPATIENT
Start: 2022-05-26

## 2022-05-25 RX ORDER — ONDANSETRON 2 MG/ML
8 INJECTION INTRAMUSCULAR; INTRAVENOUS
Status: CANCELLED | OUTPATIENT
Start: 2022-05-26

## 2022-05-25 RX ORDER — FAMOTIDINE 10 MG/ML
20 INJECTION, SOLUTION INTRAVENOUS
Status: CANCELLED | OUTPATIENT
Start: 2022-05-26

## 2022-05-25 RX ORDER — SODIUM CHLORIDE 9 MG/ML
INJECTION, SOLUTION INTRAVENOUS CONTINUOUS
Status: CANCELLED | OUTPATIENT
Start: 2022-05-26

## 2022-05-25 RX ORDER — ALBUTEROL SULFATE 90 UG/1
4 AEROSOL, METERED RESPIRATORY (INHALATION) PRN
Status: CANCELLED | OUTPATIENT
Start: 2022-05-26

## 2022-05-25 RX ORDER — DIPHENHYDRAMINE HYDROCHLORIDE 50 MG/ML
50 INJECTION INTRAMUSCULAR; INTRAVENOUS
Status: CANCELLED | OUTPATIENT
Start: 2022-05-26

## 2022-05-25 RX ORDER — SODIUM CHLORIDE 9 MG/ML
5-40 INJECTION INTRAVENOUS PRN
Status: CANCELLED | OUTPATIENT
Start: 2022-05-26

## 2022-05-25 RX ORDER — SODIUM CHLORIDE 9 MG/ML
25 INJECTION, SOLUTION INTRAVENOUS PRN
Status: CANCELLED | OUTPATIENT
Start: 2022-05-26

## 2022-05-25 RX ORDER — MEPERIDINE HYDROCHLORIDE 25 MG/ML
12.5 INJECTION INTRAMUSCULAR; INTRAVENOUS; SUBCUTANEOUS PRN
Status: CANCELLED | OUTPATIENT
Start: 2022-05-26

## 2022-05-25 RX ORDER — SODIUM CHLORIDE 0.9 % (FLUSH) 0.9 %
5-40 SYRINGE (ML) INJECTION PRN
Status: CANCELLED | OUTPATIENT
Start: 2022-05-26

## 2022-05-25 RX ORDER — ACETAMINOPHEN 325 MG/1
650 TABLET ORAL
Status: CANCELLED | OUTPATIENT
Start: 2022-05-26

## 2022-05-26 ENCOUNTER — HOSPITAL ENCOUNTER (OUTPATIENT)
Dept: INFUSION THERAPY | Age: 57
Discharge: HOME | End: 2022-05-26
Payer: COMMERCIAL

## 2022-05-26 ENCOUNTER — CLINICAL DOCUMENTATION (OUTPATIENT)
Dept: CASE MANAGEMENT | Age: 57
End: 2022-05-26

## 2022-05-26 VITALS
OXYGEN SATURATION: 94 % | HEART RATE: 90 BPM | DIASTOLIC BLOOD PRESSURE: 95 MMHG | SYSTOLIC BLOOD PRESSURE: 128 MMHG | RESPIRATION RATE: 16 BRPM | TEMPERATURE: 99.1 F

## 2022-05-26 DIAGNOSIS — Z17.1 MALIGNANT NEOPLASM OF LOWER-INNER QUADRANT OF LEFT BREAST IN FEMALE, ESTROGEN RECEPTOR NEGATIVE (HCC): Primary | ICD-10-CM

## 2022-05-26 DIAGNOSIS — C50.312 MALIGNANT NEOPLASM OF LOWER-INNER QUADRANT OF LEFT BREAST IN FEMALE, ESTROGEN RECEPTOR NEGATIVE (HCC): Primary | ICD-10-CM

## 2022-05-26 LAB
BASOPHILS ABSOLUTE: 0 K/CU MM
BASOPHILS RELATIVE PERCENT: 1 % (ref 0–1)
DIFFERENTIAL TYPE: ABNORMAL
EOSINOPHILS ABSOLUTE: 0.1 K/CU MM
EOSINOPHILS RELATIVE PERCENT: 3 % (ref 0–3)
HCT VFR BLD CALC: 34.3 % (ref 37–47)
HEMOGLOBIN: 11 GM/DL (ref 12.5–16)
LYMPHOCYTES ABSOLUTE: 0.8 K/CU MM
LYMPHOCYTES RELATIVE PERCENT: 25.5 % (ref 24–44)
MCH RBC QN AUTO: 33.3 PG (ref 27–31)
MCHC RBC AUTO-ENTMCNC: 32.1 % (ref 32–36)
MCV RBC AUTO: 103.9 FL (ref 78–100)
MONOCYTES ABSOLUTE: 0.3 K/CU MM
MONOCYTES RELATIVE PERCENT: 9.3 % (ref 0–4)
PDW BLD-RTO: 15.1 % (ref 11.7–14.9)
PLATELET # BLD: 227 K/CU MM (ref 140–440)
PMV BLD AUTO: 9.9 FL (ref 7.5–11.1)
RBC # BLD: 3.3 M/CU MM (ref 4.2–5.4)
SEGMENTED NEUTROPHILS ABSOLUTE COUNT: 1.9 K/CU MM
SEGMENTED NEUTROPHILS RELATIVE PERCENT: 61.2 % (ref 36–66)
WBC # BLD: 3 K/CU MM (ref 4–10.5)

## 2022-05-26 PROCEDURE — 2580000003 HC RX 258: Performed by: INTERNAL MEDICINE

## 2022-05-26 PROCEDURE — 6360000002 HC RX W HCPCS: Performed by: INTERNAL MEDICINE

## 2022-05-26 PROCEDURE — 85025 COMPLETE CBC W/AUTO DIFF WBC: CPT

## 2022-05-26 PROCEDURE — 96413 CHEMO IV INFUSION 1 HR: CPT

## 2022-05-26 PROCEDURE — 96375 TX/PRO/DX INJ NEW DRUG ADDON: CPT

## 2022-05-26 PROCEDURE — 2500000003 HC RX 250 WO HCPCS: Performed by: INTERNAL MEDICINE

## 2022-05-26 PROCEDURE — 96417 CHEMO IV INFUS EACH ADDL SEQ: CPT

## 2022-05-26 RX ORDER — SODIUM CHLORIDE 9 MG/ML
20 INJECTION, SOLUTION INTRAVENOUS ONCE
Status: DISCONTINUED | OUTPATIENT
Start: 2022-05-26 | End: 2022-05-27 | Stop reason: HOSPADM

## 2022-05-26 RX ORDER — DIPHENHYDRAMINE HYDROCHLORIDE 50 MG/ML
50 INJECTION INTRAMUSCULAR; INTRAVENOUS ONCE
Status: COMPLETED | OUTPATIENT
Start: 2022-05-26 | End: 2022-05-26

## 2022-05-26 RX ORDER — HEPARIN SODIUM (PORCINE) LOCK FLUSH IV SOLN 100 UNIT/ML 100 UNIT/ML
500 SOLUTION INTRAVENOUS PRN
Status: DISCONTINUED | OUTPATIENT
Start: 2022-05-26 | End: 2022-05-27 | Stop reason: HOSPADM

## 2022-05-26 RX ORDER — FAMOTIDINE 10 MG/ML
20 INJECTION, SOLUTION INTRAVENOUS ONCE
Status: COMPLETED | OUTPATIENT
Start: 2022-05-26 | End: 2022-05-26

## 2022-05-26 RX ADMIN — FAMOTIDINE 20 MG: 10 INJECTION, SOLUTION INTRAVENOUS at 09:56

## 2022-05-26 RX ADMIN — DEXAMETHASONE SODIUM PHOSPHATE 10 MG: 10 INJECTION INTRAMUSCULAR; INTRAVENOUS at 09:57

## 2022-05-26 RX ADMIN — DIPHENHYDRAMINE HYDROCHLORIDE 50 MG: 50 INJECTION INTRAMUSCULAR; INTRAVENOUS at 09:56

## 2022-05-26 RX ADMIN — PACLITAXEL 130 MG: 6 INJECTION, SOLUTION INTRAVENOUS at 10:30

## 2022-05-26 RX ADMIN — HEPARIN 500 UNITS: 100 SYRINGE at 11:39

## 2022-05-26 RX ADMIN — SODIUM CHLORIDE 20 ML/HR: 9 INJECTION, SOLUTION INTRAVENOUS at 09:57

## 2022-05-26 ASSESSMENT — PAIN SCALES - GENERAL: PAINLEVEL_OUTOF10: 0

## 2022-05-26 NOTE — PROGRESS NOTES
Appointment made for patient to see Dr. Joel Lord to discuss upcoming breast surgery once patient completes her chemotherapy on 6/16/22.

## 2022-05-26 NOTE — PROGRESS NOTES
Ambulated to infusion area. Here for treatment. Right chest mediport accessed, labs drawn. CBC within defined parameters. Numbness in left foot is a little worse since last treatment even though Taxol dose was decreased by 10%. Dr. Tran Sanchez at chairside. Patient states she does not want to take any oral medications for neuropathy or delay treatment for 1 week. Taxol dose reduced by 10%.    Taxol administered as ordered. Call light within reach. Tolerated infusion without incident. AVS provided. Discharged in stable condition.

## 2022-06-02 ENCOUNTER — OFFICE VISIT (OUTPATIENT)
Dept: ONCOLOGY | Age: 57
End: 2022-06-02
Payer: COMMERCIAL

## 2022-06-02 ENCOUNTER — HOSPITAL ENCOUNTER (OUTPATIENT)
Dept: INFUSION THERAPY | Age: 57
Discharge: HOME | End: 2022-06-02
Payer: COMMERCIAL

## 2022-06-02 VITALS
RESPIRATION RATE: 16 BRPM | HEIGHT: 61 IN | HEART RATE: 79 BPM | DIASTOLIC BLOOD PRESSURE: 65 MMHG | OXYGEN SATURATION: 98 % | SYSTOLIC BLOOD PRESSURE: 121 MMHG | BODY MASS INDEX: 47.95 KG/M2 | TEMPERATURE: 97 F | WEIGHT: 254 LBS

## 2022-06-02 VITALS
TEMPERATURE: 97 F | SYSTOLIC BLOOD PRESSURE: 121 MMHG | HEIGHT: 61 IN | HEART RATE: 79 BPM | DIASTOLIC BLOOD PRESSURE: 65 MMHG | BODY MASS INDEX: 48.07 KG/M2 | WEIGHT: 254.6 LBS | OXYGEN SATURATION: 98 %

## 2022-06-02 DIAGNOSIS — G62.9 NEUROPATHY: ICD-10-CM

## 2022-06-02 DIAGNOSIS — Z17.1 MALIGNANT NEOPLASM OF LOWER-INNER QUADRANT OF LEFT BREAST IN FEMALE, ESTROGEN RECEPTOR NEGATIVE (HCC): Primary | ICD-10-CM

## 2022-06-02 DIAGNOSIS — C50.312 MALIGNANT NEOPLASM OF LOWER-INNER QUADRANT OF LEFT BREAST IN FEMALE, ESTROGEN RECEPTOR NEGATIVE (HCC): Primary | ICD-10-CM

## 2022-06-02 LAB
ALBUMIN SERPL-MCNC: 4.1 GM/DL (ref 3.4–5)
ALP BLD-CCNC: 78 IU/L (ref 40–129)
ALT SERPL-CCNC: 31 U/L (ref 10–40)
ANION GAP SERPL CALCULATED.3IONS-SCNC: 9 MMOL/L (ref 4–16)
AST SERPL-CCNC: 23 IU/L (ref 15–37)
BASOPHILS ABSOLUTE: 0 K/CU MM
BASOPHILS RELATIVE PERCENT: 0.5 % (ref 0–1)
BILIRUB SERPL-MCNC: 0.7 MG/DL (ref 0–1)
BUN BLDV-MCNC: 16 MG/DL (ref 6–23)
CALCIUM SERPL-MCNC: 8.8 MG/DL (ref 8.3–10.6)
CHLORIDE BLD-SCNC: 106 MMOL/L (ref 99–110)
CO2: 25 MMOL/L (ref 21–32)
CREAT SERPL-MCNC: 0.5 MG/DL (ref 0.6–1.1)
DIFFERENTIAL TYPE: ABNORMAL
EOSINOPHILS ABSOLUTE: 0.1 K/CU MM
EOSINOPHILS RELATIVE PERCENT: 2.2 % (ref 0–3)
GFR AFRICAN AMERICAN: >60 ML/MIN/1.73M2
GFR NON-AFRICAN AMERICAN: >60 ML/MIN/1.73M2
GLUCOSE BLD-MCNC: 102 MG/DL (ref 70–99)
HCT VFR BLD CALC: 32.6 % (ref 37–47)
HEMOGLOBIN: 10.5 GM/DL (ref 12.5–16)
LYMPHOCYTES ABSOLUTE: 0.7 K/CU MM
LYMPHOCYTES RELATIVE PERCENT: 18.2 % (ref 24–44)
MCH RBC QN AUTO: 33.5 PG (ref 27–31)
MCHC RBC AUTO-ENTMCNC: 32.2 % (ref 32–36)
MCV RBC AUTO: 104.2 FL (ref 78–100)
MONOCYTES ABSOLUTE: 0.3 K/CU MM
MONOCYTES RELATIVE PERCENT: 8.1 % (ref 0–4)
PDW BLD-RTO: 15.3 % (ref 11.7–14.9)
PLATELET # BLD: 213 K/CU MM (ref 140–440)
PMV BLD AUTO: 9.1 FL (ref 7.5–11.1)
POTASSIUM SERPL-SCNC: 4.3 MMOL/L (ref 3.5–5.1)
RBC # BLD: 3.13 M/CU MM (ref 4.2–5.4)
SEGMENTED NEUTROPHILS ABSOLUTE COUNT: 2.6 K/CU MM
SEGMENTED NEUTROPHILS RELATIVE PERCENT: 71 % (ref 36–66)
SODIUM BLD-SCNC: 140 MMOL/L (ref 135–145)
TOTAL PROTEIN: 6 GM/DL (ref 6.4–8.2)
WBC # BLD: 3.7 K/CU MM (ref 4–10.5)

## 2022-06-02 PROCEDURE — 6360000002 HC RX W HCPCS

## 2022-06-02 PROCEDURE — 99214 OFFICE O/P EST MOD 30 MIN: CPT | Performed by: INTERNAL MEDICINE

## 2022-06-02 PROCEDURE — 96413 CHEMO IV INFUSION 1 HR: CPT

## 2022-06-02 PROCEDURE — 85025 COMPLETE CBC W/AUTO DIFF WBC: CPT

## 2022-06-02 PROCEDURE — 80053 COMPREHEN METABOLIC PANEL: CPT

## 2022-06-02 PROCEDURE — 96367 TX/PROPH/DG ADDL SEQ IV INF: CPT

## 2022-06-02 PROCEDURE — 96375 TX/PRO/DX INJ NEW DRUG ADDON: CPT

## 2022-06-02 PROCEDURE — 2500000003 HC RX 250 WO HCPCS: Performed by: INTERNAL MEDICINE

## 2022-06-02 PROCEDURE — 6360000002 HC RX W HCPCS: Performed by: INTERNAL MEDICINE

## 2022-06-02 PROCEDURE — 2580000003 HC RX 258: Performed by: INTERNAL MEDICINE

## 2022-06-02 RX ORDER — HEPARIN SODIUM (PORCINE) LOCK FLUSH IV SOLN 100 UNIT/ML 100 UNIT/ML
SOLUTION INTRAVENOUS
Status: COMPLETED
Start: 2022-06-02 | End: 2022-06-02

## 2022-06-02 RX ORDER — SODIUM CHLORIDE 9 MG/ML
20 INJECTION, SOLUTION INTRAVENOUS ONCE
Status: DISCONTINUED | OUTPATIENT
Start: 2022-06-02 | End: 2022-06-03 | Stop reason: HOSPADM

## 2022-06-02 RX ORDER — SODIUM CHLORIDE 9 MG/ML
5-40 INJECTION INTRAVENOUS PRN
Status: DISCONTINUED | OUTPATIENT
Start: 2022-06-02 | End: 2022-06-03 | Stop reason: HOSPADM

## 2022-06-02 RX ORDER — SODIUM CHLORIDE 9 MG/ML
INJECTION, SOLUTION INTRAVENOUS CONTINUOUS
Status: CANCELLED | OUTPATIENT
Start: 2022-06-02

## 2022-06-02 RX ORDER — DIPHENHYDRAMINE HYDROCHLORIDE 50 MG/ML
50 INJECTION INTRAMUSCULAR; INTRAVENOUS ONCE
Status: COMPLETED | OUTPATIENT
Start: 2022-06-02 | End: 2022-06-02

## 2022-06-02 RX ORDER — SODIUM CHLORIDE 9 MG/ML
25 INJECTION, SOLUTION INTRAVENOUS PRN
Status: CANCELLED | OUTPATIENT
Start: 2022-06-02

## 2022-06-02 RX ORDER — EPINEPHRINE 1 MG/ML
0.3 INJECTION, SOLUTION, CONCENTRATE INTRAVENOUS PRN
Status: CANCELLED | OUTPATIENT
Start: 2022-06-02

## 2022-06-02 RX ORDER — HEPARIN SODIUM (PORCINE) LOCK FLUSH IV SOLN 100 UNIT/ML 100 UNIT/ML
500 SOLUTION INTRAVENOUS PRN
Status: DISCONTINUED | OUTPATIENT
Start: 2022-06-02 | End: 2022-06-03 | Stop reason: HOSPADM

## 2022-06-02 RX ORDER — ACETAMINOPHEN 325 MG/1
650 TABLET ORAL
Status: CANCELLED | OUTPATIENT
Start: 2022-06-02

## 2022-06-02 RX ORDER — MEPERIDINE HYDROCHLORIDE 25 MG/ML
12.5 INJECTION INTRAMUSCULAR; INTRAVENOUS; SUBCUTANEOUS PRN
Status: CANCELLED | OUTPATIENT
Start: 2022-06-02

## 2022-06-02 RX ORDER — SODIUM CHLORIDE 0.9 % (FLUSH) 0.9 %
5-40 SYRINGE (ML) INJECTION PRN
Status: CANCELLED | OUTPATIENT
Start: 2022-06-02

## 2022-06-02 RX ORDER — ONDANSETRON 2 MG/ML
8 INJECTION INTRAMUSCULAR; INTRAVENOUS
Status: CANCELLED | OUTPATIENT
Start: 2022-06-02

## 2022-06-02 RX ORDER — ALBUTEROL SULFATE 90 UG/1
4 AEROSOL, METERED RESPIRATORY (INHALATION) PRN
Status: CANCELLED | OUTPATIENT
Start: 2022-06-02

## 2022-06-02 RX ORDER — DIPHENHYDRAMINE HYDROCHLORIDE 50 MG/ML
50 INJECTION INTRAMUSCULAR; INTRAVENOUS
Status: CANCELLED | OUTPATIENT
Start: 2022-06-02

## 2022-06-02 RX ORDER — FAMOTIDINE 10 MG/ML
20 INJECTION, SOLUTION INTRAVENOUS ONCE
Status: COMPLETED | OUTPATIENT
Start: 2022-06-02 | End: 2022-06-02

## 2022-06-02 RX ORDER — FAMOTIDINE 10 MG/ML
20 INJECTION, SOLUTION INTRAVENOUS
Status: CANCELLED | OUTPATIENT
Start: 2022-06-02

## 2022-06-02 RX ADMIN — DIPHENHYDRAMINE HYDROCHLORIDE 50 MG: 50 INJECTION INTRAMUSCULAR; INTRAVENOUS at 11:37

## 2022-06-02 RX ADMIN — HEPARIN 500 UNITS: 100 SYRINGE at 13:27

## 2022-06-02 RX ADMIN — SODIUM CHLORIDE 20 ML/HR: 9 INJECTION, SOLUTION INTRAVENOUS at 11:36

## 2022-06-02 RX ADMIN — HEPARIN SODIUM (PORCINE) LOCK FLUSH IV SOLN 100 UNIT/ML 500 UNITS: 100 SOLUTION at 13:27

## 2022-06-02 RX ADMIN — DEXAMETHASONE SODIUM PHOSPHATE 10 MG: 4 INJECTION, SOLUTION INTRAMUSCULAR; INTRAVENOUS at 11:40

## 2022-06-02 RX ADMIN — PACLITAXEL 108 MG: 6 INJECTION, SOLUTION, CONCENTRATE INTRAVENOUS at 12:26

## 2022-06-02 RX ADMIN — FAMOTIDINE 20 MG: 10 INJECTION, SOLUTION INTRAVENOUS at 11:36

## 2022-06-02 NOTE — PROGRESS NOTES
MA Rooming Questions  Patient: Zev Pina  MRN: [id number]    Date: 6/2/2022        1. Do you have any new issues? yes - more tired than usual, issues w/ toenails, numbness in feet,  nigh sweats still ,Jessica Conquest RN wanting to know about dose being reduced. 2. Do you need any refills on medications?    no    3. Have you had any imaging done since your last visit?   no    4. Have you been hospitalized or seen in the emergency room since your last visit here?   no    5. Did the patient have a depression screening completed today?  No    No data recorded     PHQ-9 Given to (if applicable):               PHQ-9 Score (if applicable):                     [] Positive     []  Negative              Does question #9 need addressed (if applicable)                     [] Yes    []  No               Matt Pain, CMA

## 2022-06-02 NOTE — PROGRESS NOTES
Pt here for chemotherapy infusion. Pt with  Complaints of numbness to both feet, numbness is worsening in the left foot. Pt requesting dose be decreased by 10%. Pt taken to see physician pt will report the numbness to both feet, with numbness worsening in the left foot. Pt requesting dose be decreased by 10%. Pt seen and brought back to treatment suite by Dr Laurie Bauer. Per  Dr Laurie Bauer dose to be decreased by 15% today. Patient's status assessed and documented appropriately. All labs and required results were also reviewed today. Treatment parameters have been reviewed. Today's treatment has been approved by the provider. Treatment orders and medication sequencing (when applicable) was verified by 2 registered nurses. The treatment plan was confirmed with the patient prior to administration, and the patient understands the need to report any treatment-related symptoms. Prior to administration, when applicable, the following 8 elements of medication administration were reviewed with 2nd Registered Nurse prior to dosing: drug name, drug dose, infusion volume when prepared in a syringe, rate of administration, expiration dates and/or times, appearance and integrity of drug(s), and rate of pump for infusion. The 5 rights of medication administration have been verified. Infusion completed. Pt given discharge instructions and left treatment center ambulatory by herself.

## 2022-06-02 NOTE — PROGRESS NOTES
Patient Name:  Yamila Hamilton  Patient :  1965  Patient MRN:  [id number]     Primary Oncologist: Mindy Gregory MD  Referring Provider: Avani Ibrahim PA-C     Date of Service: 2022      Reason for Consult:  TNBC     Chief Complaint:    Chief Complaint   Patient presents with    Follow-up    Chemotherapy       Encounter Diagnoses   Name Primary?  Malignant neoplasm of lower-inner quadrant of left breast in female, estrogen receptor negative ([facility] [address] Yes    Neuropathy         HPI:   2021: She had with her  to the clinic today. Reported that she was found to have a mass in the left breast on routine mammogram.  Denied any axial lymphadenopathy. Denied any weight loss. Denied any new bone pains. Denied any focal weakness. Reported blurred vision, needs new glasses. Headaches lately. But reported that she recently quit drinking alcohol. Denied any chest pain or increase shortness of breath. Denied any abdominal pain, nausea vomiting, diarrhea, constipation    21: Mammogram:  Recommend spot gene synthesis compression views and possible ultrasound for   focal asymmetry in the left upper inner breast, middle depth.       BIRADS:   BIRADS - CATEGORY 0       Incomplete: Needs Additional Imaging Evaluation     11/10/21: Mammogram:  0.7 x 0.8 x 0.7 cm suspicious mass left breast 8 o'clock, about 3-4 cm from   the left nipple.  This likely correlates with the mammographic finding. Ultrasound-guided biopsy is advised.       Recommend post biopsy mammographic correlation to confirm that it correlates   with the mammographic finding.       These results and recommendations were discussed with the patient by Dr. Jayesh Laytonle will be assisted to schedule the recommended biopsy by the breast   nurse navigator.  An order will be requested from her referring physician.       BIRADS:   BIRADS - CATEGORY 4B       Intermediate suspicion for malignancy.       Suspicious Abnormality.  Biopsy should be considered at this time. 12/10/21:Final Pathologic Diagnosis:   Breast, left, 8 o'clock, ultrasound guided needle core   biopsy:   -  INVASIVE DUCTAL CARCINOMA (see comment, see stains   report). BREAST INVASIVE CARCINOMA SUMMARY - CORE BIOPSY   Procedure:  Ultrasound guided needle core biopsy. Laterality and tumor site:  Left breast at 8 o'clock. Tumor size:  7 mm. in greatest dimension within biopsy   material present. Histologic type:  Invasive ductal carcinoma.     Histologic Grade (Jennifer): Grade 2,        -Tubular score 3, Nuclear score 3, Mitosis score 1 (7   /10 hpf). Ductal Carcinoma In Situ (DCIS):  Not present. Lobular Carcinoma In Situ (LCIS):  Not present.     Lympho-vascular invasion:  Not identified.     Microcalcifications:  Present.     Additional Findings:  None.     Ancillary studies: ER/PgR/Her2 results from the current   biopsy are as follows:   -ER by [facility]*: Negative (0% staining)   -PgR by IHC*: Negative (0% staining)   -Her2 by IHC*: Negative (Score 0)   -Her2 by FISH*:Negative   -Avg. #HER2 signals/nucleus: 1.8, Avg. #CEP17   signals/nucleus: 1.72, HER2/CEP17 ratio: 1.05      12/16/2021 CBC WBC of 4.8 hemoglobin of 12.6 hematocrit of 39.6 MCV of 100.5 and platelets of 087. Sodium of 138 potassium 4.6 BUN of 11 creatinine 0.4. LFTs with alk phos 155 ALT 54    12/23/2021 CT scan of the abdomen pelvis with IV contrast revealed couple of noncalcified micro nodules in the lower lobe of the right lung.     1/10/22: PORT placed    1/17/22: PET:  Approximate 1.1 cm mass of the left lower inner breast with minimal focal FDG   avidity which may correspond to the known primary.  There is no evidence of   metastatic disease.         C1D1 neoadjuvant AC started 1/20/22 Feb 2022 no clinical relevant variables detected on genetic testing    3/31/22: C1D1 taxol     Past Medical History:     Hyperlipidemia, hypertension, depression Past Surgery History: Total abdominal hysterectomy, bilateral salpingo-oophorectomy                                                                          Social History:   Lives with a partner. Has 2 children 22 and 35, 2 daughters. Quit smoking about 25 years ago prior to which she smoked 1 pack/day for 7 years. Quit drinking alcohol on 12/2/2021 prior to which she consumed 4-5 beers per day and also whiskey sometimes. Currently on sick leave, was an  at [employer] Family History:    Father diagnosed with prostate cancer                                                                                               No Known Allergies    Current Outpatient Medications on File Prior to Visit   Medication Sig Dispense Refill    CLONIDINE HCL PO Take by mouth      lidocaine-prilocaine (EMLA) 2.5-2.5 % cream Apply topically to [facility] site 30-60 minutes prior to treatments. 30 g 1    dexamethasone (DECADRON) 4 MG tablet Two tablets (8 mg) po day before chemotherapy with taxol 2 tablet 0    dexamethasone (DECADRON) 2 MG tablet Take 1 tablet by mouth daily (with breakfast) for two days AFTER chemotherapy. Take 8 mg (4 tabs) the night before 1st chemo ONLY.  28 tablet 0    Cholecalciferol (VITAMIN D3 PO) Take 1 tablet by mouth daily      OLANZapine (ZYPREXA) 5 MG tablet One tablet po HS for four nights starting the night before chemotherapy 16 tablet 0    ondansetron (ZOFRAN) 8 MG tablet Take 1 tablet by mouth every 8 hours as needed for Nausea or Vomiting 30 tablet 1    FLUoxetine (PROZAC) 20 MG capsule Take 20 mg by mouth daily      ibuprofen (ADVIL;MOTRIN) 600 MG tablet Take 600 mg by mouth every 6 hours as needed for Pain      ATORVASTATIN CALCIUM PO Take by mouth      lisinopril (PRINIVIL;ZESTRIL) 20 MG tablet TAKE ONE TABLET BY MOUTH DAILY 90 tablet 1    acyclovir (ZOVIRAX) 400 MG tablet Take 1 tablet by mouth daily 90 tablet 1     No current facility-administered medications on file prior to visit. Interval history:6/2/22: She arrived alone to the clinic today. Back pain intensified after she started chemotherapy, had had for a long period of time. Reported constant numbing in the toes , worsened in the left foot. Not falling. No numbness in the hands. Hot flashes better with clonidine. Review of Systems:  As per internal history     Vital Signs: /65 (Position: Sitting, Cuff Size: Large Adult)   Pulse 79   Temp 97 °F (36.1 °C) (Temporal)   Resp 16   Ht 5' 1\" (1.549 m)   Wt 254 lb (115.2 kg)   SpO2 98%   BMI 47.99 kg/m²     CONSTITUTIONAL: awake, alert, obese  EYES: IGNACIO, No pallor or any icterus  ENT: ATNC  NECK: No JVD, port in the right side, slight edema and ttp. Mild erythema. HEMATOLOGIC/LYMPHATIC: no cervical, supraclavicular or axillary lymphadenopathy   LUNGS: CTAB  CARDIOVASCULAR: s1s2 rrr no murmurs  ABDOMEN: soft ntnd bs pos  NEUROLOGIC: GI  SKIN: No rash  EXTREMITIES: no LE edema bilaterally  Breast, did not feel  left breast mass, did not appreciate any axillary lymphadenopathy.   No masses in the right breast or right axillary lymphadenopathy     Labs:  Hematology:  Lab Results   Component Value Date    WBC 3.7 (L) 06/02/2022    RBC 3.13 (L) 06/02/2022    HGB 10.5 (L) 06/02/2022    HCT 32.6 (L) 06/02/2022    .2 (H) 06/02/2022    MCH 33.5 (H) 06/02/2022    MCHC 32.2 06/02/2022    RDW 15.3 (H) 06/02/2022     06/02/2022    MPV 9.1 06/02/2022    SEGSPCT 71.0 (H) 06/02/2022    EOSRELPCT 2.2 06/02/2022    BASOPCT 0.5 06/02/2022    LYMPHOPCT 18.2 (L) 06/02/2022    MONOPCT 8.1 (H) 06/02/2022    SEGSABS 2.6 06/02/2022    EOSABS 0.1 06/02/2022    BASOSABS 0.0 06/02/2022    LYMPHSABS 0.7 06/02/2022    MONOSABS 0.3 06/02/2022    DIFFTYPE AUTOMATED DIFFERENTIAL 06/02/2022     No results found for: ESR  Chemistry:  Lab Results   Component Value Date     05/19/2022    K 3.9 05/19/2022     05/19/2022    CO2 27 05/19/2022    BUN 15 05/19/2022    CREATININE 0.4 (L) 05/19/2022    GLUCOSE 109 (H) 05/19/2022    CALCIUM 9.0 05/19/2022    PROT 5.8 (L) 05/19/2022    LABALBU 4.0 05/19/2022    BILITOT 0.7 05/19/2022    ALKPHOS 77 05/19/2022    AST 16 05/19/2022    ALT 24 05/19/2022    LABGLOM >60 05/19/2022    GFRAA >60 05/19/2022    AGRATIO 1.7 10/11/2016    GLOB 2.4 10/11/2016     No results found for: MMA, LDH, HOMOCYSTEINE  No components found for: LD  Lab Results   Component Value Date    TSHHS 1.650 02/24/2021    T4FREE 1.04 02/24/2021    FT3 2.8 02/24/2021     Immunology:  Lab Results   Component Value Date    PROT 5.8 (L) 05/19/2022     No results found for: Samaria Show, KLFLCR  No results found for: B2M  Coagulation Panel:  No results found for: PROTIME, INR, APTT, DDIMER  Anemia Panel:  No results found for: [id number], FOLATE  Tumor Markers:  Lab Results   Component Value Date    LABCA2 29.6 01/19/2022        Observations:  ECOG:  No data recorded       Assessment & Plan:                                                          Triple negative breast cancer,biopsy on 12/1/2021,  left, 1.1 cm, grade 2, no evidence of any axillary  lymphadenopathy per available imaging studies. PET scan jan 2022 with no other met disease. Ca 27-29 was 29.6 in jan 2021 . Discussed the findings, diagnosis, possible staging and recommended neoadjuvant chemotherapy with AC followed by T followed by surgical resection followed by radiation pending surgical modality(Note genetic testing negative, Pt yet undecided). Discussed the adverse effects of doxorubicin, cyclophosphamide and Taxol. Answered all questions. PORT completed and OCM completed. Echocardiogram jan 12 2022 with preserved EF.   C1D1 AC started jan 20 2022. Completed march 3 2022   Dose adjustments were  made according to adverse effects and cytopenias.    Started Neoadjuvant Taxol march 31/2022, will monitor for adverse effects. VUS MSH6 [id number]  Taxol dose has been modified  sec to neuropathy, discussed that we can discontinue taxol but she would like to proceed with reduced dose today's cycle 10  She would like to proceed with bilateral mastectomy, has appt with surgeon June 8 2022   Will follow per NCCN guidelines after completion of all the treatments    NC anemia: Most probably sec to chemotherapy. Will monitor for now. Transfusion support as needed    Neutropenia: is  most probably sec to chemotherapy. Dose adjustments accordingly    Recommend complete cessation of ETOH. Constipation: recommended stool softener and laxatives as needed. ?Seasonal allergies: Recommended trial of claritin    Neuropathy: Intermittent and mild and chronic. Note h/o ETOH. Defers pharmacological intervention at this point . Recommend epsom salt soaks and also discussed topical compounded. As above mentioned. Continue other medical care. Thank you for letting us be part of the care and will follow along. Discussed above findings and plan with her and she voiced understanding. Answered all her questions. Discussed healthy lifestyle including healthy diet, regular exercise as tolerated. Also discussed importance of being up-to-date with age-appropriate screening tools. Quit smoking and taking THC  Recommend ETOH cessation    Recommend follow-up with primary care physician and other specialists. Please do not hesitate to contact us if you need further information. Return to clinic June 16 2022   or earlier if new Sx    I have recommended that the patient follow CDC guidelines for prevention of COVID-19 infection. Received COVID vaccine, booster not yet. No Flu vaccine either, never taken.      [address]

## 2022-06-08 ENCOUNTER — HOSPITAL ENCOUNTER (OUTPATIENT)
Age: 57
Discharge: HOME | End: 2022-06-08
Payer: COMMERCIAL

## 2022-06-08 LAB
ALBUMIN SERPL-MCNC: 4.3 GM/DL (ref 3.4–5)
ALP BLD-CCNC: 87 IU/L (ref 40–128)
ALT SERPL-CCNC: 31 U/L (ref 10–40)
ANION GAP SERPL CALCULATED.3IONS-SCNC: 11 MMOL/L (ref 4–16)
AST SERPL-CCNC: 27 IU/L (ref 15–37)
BASOPHILS ABSOLUTE: 0 K/CU MM
BASOPHILS RELATIVE PERCENT: 0.6 % (ref 0–1)
BILIRUB SERPL-MCNC: 0.9 MG/DL (ref 0–1)
BUN BLDV-MCNC: 15 MG/DL (ref 6–23)
CALCIUM SERPL-MCNC: 9.5 MG/DL (ref 8.3–10.6)
CHLORIDE BLD-SCNC: 101 MMOL/L (ref 99–110)
CHOLESTEROL: 230 MG/DL
CO2: 25 MMOL/L (ref 21–32)
CREAT SERPL-MCNC: 0.4 MG/DL (ref 0.6–1.1)
DIFFERENTIAL TYPE: ABNORMAL
EOSINOPHILS ABSOLUTE: 0.1 K/CU MM
EOSINOPHILS RELATIVE PERCENT: 2.1 % (ref 0–3)
ESTIMATED AVERAGE GLUCOSE: 108 MG/DL
GFR AFRICAN AMERICAN: >60 ML/MIN/1.73M2
GFR NON-AFRICAN AMERICAN: >60 ML/MIN/1.73M2
GLUCOSE BLD-MCNC: 89 MG/DL (ref 70–99)
HBA1C MFR BLD: 5.4 % (ref 4.2–6.3)
HCT VFR BLD CALC: 36.3 % (ref 37–47)
HDLC SERPL-MCNC: 50 MG/DL
HEMOGLOBIN: 11.7 GM/DL (ref 12.5–16)
IMMATURE NEUTROPHIL %: 1 % (ref 0–0.43)
LDL CHOLESTEROL CALCULATED: 150 MG/DL
LYMPHOCYTES ABSOLUTE: 0.8 K/CU MM
LYMPHOCYTES RELATIVE PERCENT: 17 % (ref 24–44)
MCH RBC QN AUTO: 33.3 PG (ref 27–31)
MCHC RBC AUTO-ENTMCNC: 32.2 % (ref 32–36)
MCV RBC AUTO: 103.4 FL (ref 78–100)
MONOCYTES ABSOLUTE: 0.4 K/CU MM
MONOCYTES RELATIVE PERCENT: 7.9 % (ref 0–4)
NUCLEATED RBC %: 0 %
PDW BLD-RTO: 14.6 % (ref 11.7–14.9)
PLATELET # BLD: 225 K/CU MM (ref 140–440)
PMV BLD AUTO: 10.2 FL (ref 7.5–11.1)
POTASSIUM SERPL-SCNC: 4.3 MMOL/L (ref 3.5–5.1)
RBC # BLD: 3.51 M/CU MM (ref 4.2–5.4)
SEGMENTED NEUTROPHILS ABSOLUTE COUNT: 3.4 K/CU MM
SEGMENTED NEUTROPHILS RELATIVE PERCENT: 71.4 % (ref 36–66)
SODIUM BLD-SCNC: 137 MMOL/L (ref 135–145)
T3 FREE: 3.9 PG/ML (ref 2.3–4.2)
T4 FREE: 1.28 NG/DL (ref 0.9–1.8)
TOTAL IMMATURE NEUTOROPHIL: 0.05 K/CU MM
TOTAL NUCLEATED RBC: 0 K/CU MM
TOTAL PROTEIN: 6.4 GM/DL (ref 6.4–8.2)
TRIGL SERPL-MCNC: 151 MG/DL
TSH HIGH SENSITIVITY: 1.87 UIU/ML (ref 0.27–4.2)
WBC # BLD: 4.8 K/CU MM (ref 4–10.5)

## 2022-06-08 PROCEDURE — 83036 HEMOGLOBIN GLYCOSYLATED A1C: CPT

## 2022-06-08 PROCEDURE — 85025 COMPLETE CBC W/AUTO DIFF WBC: CPT

## 2022-06-08 PROCEDURE — 84439 ASSAY OF FREE THYROXINE: CPT

## 2022-06-08 PROCEDURE — 84481 FREE ASSAY (FT-3): CPT

## 2022-06-08 PROCEDURE — 80053 COMPREHEN METABOLIC PANEL: CPT

## 2022-06-08 PROCEDURE — 84443 ASSAY THYROID STIM HORMONE: CPT

## 2022-06-08 PROCEDURE — 36415 COLL VENOUS BLD VENIPUNCTURE: CPT

## 2022-06-08 PROCEDURE — 80061 LIPID PANEL: CPT

## 2022-06-08 RX ORDER — SODIUM CHLORIDE 0.9 % (FLUSH) 0.9 %
5-40 SYRINGE (ML) INJECTION PRN
Status: CANCELLED | OUTPATIENT
Start: 2022-06-09

## 2022-06-08 RX ORDER — FAMOTIDINE 10 MG/ML
20 INJECTION, SOLUTION INTRAVENOUS
Status: CANCELLED | OUTPATIENT
Start: 2022-06-09

## 2022-06-08 RX ORDER — SODIUM CHLORIDE 9 MG/ML
25 INJECTION, SOLUTION INTRAVENOUS PRN
Status: CANCELLED | OUTPATIENT
Start: 2022-06-09

## 2022-06-08 RX ORDER — ALBUTEROL SULFATE 90 UG/1
4 AEROSOL, METERED RESPIRATORY (INHALATION) PRN
Status: CANCELLED | OUTPATIENT
Start: 2022-06-09

## 2022-06-08 RX ORDER — MEPERIDINE HYDROCHLORIDE 25 MG/ML
12.5 INJECTION INTRAMUSCULAR; INTRAVENOUS; SUBCUTANEOUS PRN
Status: CANCELLED | OUTPATIENT
Start: 2022-06-09

## 2022-06-08 RX ORDER — DIPHENHYDRAMINE HYDROCHLORIDE 50 MG/ML
50 INJECTION INTRAMUSCULAR; INTRAVENOUS
Status: CANCELLED | OUTPATIENT
Start: 2022-06-09

## 2022-06-08 RX ORDER — ACETAMINOPHEN 325 MG/1
650 TABLET ORAL
Status: CANCELLED | OUTPATIENT
Start: 2022-06-09

## 2022-06-08 RX ORDER — EPINEPHRINE 1 MG/ML
0.3 INJECTION, SOLUTION, CONCENTRATE INTRAVENOUS PRN
Status: CANCELLED | OUTPATIENT
Start: 2022-06-09

## 2022-06-08 RX ORDER — SODIUM CHLORIDE 9 MG/ML
5-40 INJECTION INTRAVENOUS PRN
Status: CANCELLED | OUTPATIENT
Start: 2022-06-09

## 2022-06-08 RX ORDER — SODIUM CHLORIDE 9 MG/ML
INJECTION, SOLUTION INTRAVENOUS CONTINUOUS
Status: CANCELLED | OUTPATIENT
Start: 2022-06-09

## 2022-06-08 RX ORDER — ONDANSETRON 2 MG/ML
8 INJECTION INTRAMUSCULAR; INTRAVENOUS
Status: CANCELLED | OUTPATIENT
Start: 2022-06-09

## 2022-06-09 ENCOUNTER — CLINICAL DOCUMENTATION (OUTPATIENT)
Dept: CASE MANAGEMENT | Age: 57
End: 2022-06-09

## 2022-06-09 ENCOUNTER — HOSPITAL ENCOUNTER (OUTPATIENT)
Dept: INFUSION THERAPY | Age: 57
Discharge: HOME | End: 2022-06-09
Payer: COMMERCIAL

## 2022-06-09 VITALS
WEIGHT: 252.8 LBS | TEMPERATURE: 97 F | OXYGEN SATURATION: 95 % | SYSTOLIC BLOOD PRESSURE: 124 MMHG | HEIGHT: 61 IN | HEART RATE: 84 BPM | DIASTOLIC BLOOD PRESSURE: 72 MMHG | BODY MASS INDEX: 47.73 KG/M2

## 2022-06-09 DIAGNOSIS — Z17.1 MALIGNANT NEOPLASM OF LOWER-INNER QUADRANT OF LEFT BREAST IN FEMALE, ESTROGEN RECEPTOR NEGATIVE (HCC): Primary | ICD-10-CM

## 2022-06-09 DIAGNOSIS — C50.312 MALIGNANT NEOPLASM OF LOWER-INNER QUADRANT OF LEFT BREAST IN FEMALE, ESTROGEN RECEPTOR NEGATIVE (HCC): Primary | ICD-10-CM

## 2022-06-09 PROCEDURE — 2580000003 HC RX 258: Performed by: INTERNAL MEDICINE

## 2022-06-09 PROCEDURE — 96367 TX/PROPH/DG ADDL SEQ IV INF: CPT

## 2022-06-09 PROCEDURE — 96375 TX/PRO/DX INJ NEW DRUG ADDON: CPT

## 2022-06-09 PROCEDURE — 2500000003 HC RX 250 WO HCPCS: Performed by: INTERNAL MEDICINE

## 2022-06-09 PROCEDURE — 96413 CHEMO IV INFUSION 1 HR: CPT

## 2022-06-09 PROCEDURE — 6360000002 HC RX W HCPCS: Performed by: INTERNAL MEDICINE

## 2022-06-09 RX ORDER — HEPARIN SODIUM (PORCINE) LOCK FLUSH IV SOLN 100 UNIT/ML 100 UNIT/ML
500 SOLUTION INTRAVENOUS PRN
Status: DISCONTINUED | OUTPATIENT
Start: 2022-06-09 | End: 2022-06-10 | Stop reason: HOSPADM

## 2022-06-09 RX ORDER — DIPHENHYDRAMINE HYDROCHLORIDE 50 MG/ML
50 INJECTION INTRAMUSCULAR; INTRAVENOUS ONCE
Status: COMPLETED | OUTPATIENT
Start: 2022-06-09 | End: 2022-06-09

## 2022-06-09 RX ORDER — SODIUM CHLORIDE 9 MG/ML
20 INJECTION, SOLUTION INTRAVENOUS ONCE
Status: DISCONTINUED | OUTPATIENT
Start: 2022-06-09 | End: 2022-06-10 | Stop reason: HOSPADM

## 2022-06-09 RX ORDER — FAMOTIDINE 10 MG/ML
20 INJECTION, SOLUTION INTRAVENOUS ONCE
Status: COMPLETED | OUTPATIENT
Start: 2022-06-09 | End: 2022-06-09

## 2022-06-09 RX ADMIN — SODIUM CHLORIDE 20 ML/HR: 9 INJECTION, SOLUTION INTRAVENOUS at 10:33

## 2022-06-09 RX ADMIN — PACLITAXEL 108 MG: 6 INJECTION, SOLUTION, CONCENTRATE INTRAVENOUS at 11:25

## 2022-06-09 RX ADMIN — HEPARIN 500 UNITS: 100 SYRINGE at 12:32

## 2022-06-09 RX ADMIN — DIPHENHYDRAMINE HYDROCHLORIDE 50 MG: 50 INJECTION INTRAMUSCULAR; INTRAVENOUS at 10:29

## 2022-06-09 RX ADMIN — DEXAMETHASONE SODIUM PHOSPHATE 10 MG: 4 INJECTION INTRA-ARTICULAR; INTRALESIONAL; INTRAMUSCULAR; INTRAVENOUS; SOFT TISSUE at 10:34

## 2022-06-09 RX ADMIN — FAMOTIDINE 20 MG: 10 INJECTION, SOLUTION INTRAVENOUS at 10:29

## 2022-06-09 NOTE — PROGRESS NOTES
Ambulated to infusion area, here today for chemotherapy. Patient reports neuropathy still present, and would like to try topical cream Dr. Kim Weiss recommended during last OV. Right chest mediport accessed, positive blood return noted. Labs within defined limits. Discussed request with Dr. Kim Weiss, and will have Lilia Florez, send prescription in. Chemo administered as ordered. Call light within reach. Tolerated infusion without incident. Discharge instructions provided. Patient RTC 06/16 for next infusion and OV with Dr. Kim Weiss. Status appropriately assessed and documented. All required labs and results reviewed. Treatment approved by provider. Treatment orders and medications verified by 2 Registered Nurses where applicable. Treatment plan was confirmed with patient prior to administration, and educated the need to report any treatment-related symptoms. Prior to administration, when applicable, the following 8 elements of medication administration were reviewed with 2nd Registered Nurse prior to dosing: drug name, drug dose, infusion volume when prepared in a syringe, rate of administration, expiration dates and/or times, appearance and integrity of drug(s), and rate of pump for infusion. The 5 rights of medication administration have been verified.

## 2022-06-09 NOTE — PROGRESS NOTES
Per Dr. Chrissy Barnes - chemo induced peripheral neuropathy compound cream order faxed to [facility] at [phone].

## 2022-06-10 ENCOUNTER — HOSPITAL ENCOUNTER (OUTPATIENT)
Dept: WOMENS IMAGING | Age: 57
Discharge: HOME | End: 2022-06-10
Payer: COMMERCIAL

## 2022-06-10 ENCOUNTER — HOSPITAL ENCOUNTER (OUTPATIENT)
Dept: ULTRASOUND IMAGING | Age: 57
Discharge: HOME | End: 2022-06-10
Payer: COMMERCIAL

## 2022-06-10 DIAGNOSIS — C50.319 MALIGNANT NEOPLASM OF LOWER-INNER QUADRANT OF BREAST IN FEMALE, ESTROGEN RECEPTOR NEGATIVE, UNSPECIFIED LATERALITY (HCC): ICD-10-CM

## 2022-06-10 DIAGNOSIS — Z17.1 MALIGNANT NEOPLASM OF LOWER-INNER QUADRANT OF BREAST IN FEMALE, ESTROGEN RECEPTOR NEGATIVE, UNSPECIFIED LATERALITY (HCC): ICD-10-CM

## 2022-06-10 PROCEDURE — 76642 ULTRASOUND BREAST LIMITED: CPT

## 2022-06-10 PROCEDURE — G0279 TOMOSYNTHESIS, MAMMO: HCPCS

## 2022-06-15 RX ORDER — SODIUM CHLORIDE 0.9 % (FLUSH) 0.9 %
5-40 SYRINGE (ML) INJECTION PRN
OUTPATIENT
Start: 2022-06-16

## 2022-06-15 RX ORDER — SODIUM CHLORIDE 9 MG/ML
20 INJECTION, SOLUTION INTRAVENOUS ONCE
OUTPATIENT
Start: 2022-06-16 | End: 2022-06-16

## 2022-06-15 RX ORDER — DIPHENHYDRAMINE HYDROCHLORIDE 50 MG/ML
50 INJECTION INTRAMUSCULAR; INTRAVENOUS
OUTPATIENT
Start: 2022-06-16

## 2022-06-15 RX ORDER — EPINEPHRINE 1 MG/ML
0.3 INJECTION, SOLUTION, CONCENTRATE INTRAVENOUS PRN
OUTPATIENT
Start: 2022-06-16

## 2022-06-15 RX ORDER — SODIUM CHLORIDE 9 MG/ML
INJECTION, SOLUTION INTRAVENOUS CONTINUOUS
OUTPATIENT
Start: 2022-06-16

## 2022-06-15 RX ORDER — HEPARIN SODIUM (PORCINE) LOCK FLUSH IV SOLN 100 UNIT/ML 100 UNIT/ML
500 SOLUTION INTRAVENOUS PRN
OUTPATIENT
Start: 2022-06-16

## 2022-06-15 RX ORDER — ONDANSETRON 2 MG/ML
8 INJECTION INTRAMUSCULAR; INTRAVENOUS
OUTPATIENT
Start: 2022-06-16

## 2022-06-15 RX ORDER — FAMOTIDINE 10 MG/ML
20 INJECTION, SOLUTION INTRAVENOUS
OUTPATIENT
Start: 2022-06-16

## 2022-06-15 RX ORDER — SODIUM CHLORIDE 9 MG/ML
5-40 INJECTION INTRAVENOUS PRN
OUTPATIENT
Start: 2022-06-16

## 2022-06-15 RX ORDER — SODIUM CHLORIDE 9 MG/ML
25 INJECTION, SOLUTION INTRAVENOUS PRN
OUTPATIENT
Start: 2022-06-16

## 2022-06-15 RX ORDER — ACETAMINOPHEN 325 MG/1
650 TABLET ORAL
OUTPATIENT
Start: 2022-06-16

## 2022-06-15 RX ORDER — DIPHENHYDRAMINE HYDROCHLORIDE 50 MG/ML
50 INJECTION INTRAMUSCULAR; INTRAVENOUS ONCE
OUTPATIENT
Start: 2022-06-16

## 2022-06-15 RX ORDER — ALBUTEROL SULFATE 90 UG/1
4 AEROSOL, METERED RESPIRATORY (INHALATION) PRN
OUTPATIENT
Start: 2022-06-16

## 2022-06-15 RX ORDER — FAMOTIDINE 10 MG/ML
20 INJECTION, SOLUTION INTRAVENOUS ONCE
OUTPATIENT
Start: 2022-06-16

## 2022-06-15 RX ORDER — MEPERIDINE HYDROCHLORIDE 25 MG/ML
12.5 INJECTION INTRAMUSCULAR; INTRAVENOUS; SUBCUTANEOUS PRN
OUTPATIENT
Start: 2022-06-16

## 2022-06-16 ENCOUNTER — HOSPITAL ENCOUNTER (OUTPATIENT)
Dept: INFUSION THERAPY | Age: 57
Discharge: HOME | End: 2022-06-16

## 2022-06-16 ENCOUNTER — OFFICE VISIT (OUTPATIENT)
Dept: ONCOLOGY | Age: 57
End: 2022-06-16
Payer: COMMERCIAL

## 2022-06-16 VITALS
BODY MASS INDEX: 48.15 KG/M2 | SYSTOLIC BLOOD PRESSURE: 123 MMHG | WEIGHT: 255 LBS | OXYGEN SATURATION: 92 % | HEART RATE: 95 BPM | DIASTOLIC BLOOD PRESSURE: 88 MMHG | TEMPERATURE: 97.6 F | HEIGHT: 61 IN

## 2022-06-16 DIAGNOSIS — R06.00 DYSPNEA, UNSPECIFIED TYPE: ICD-10-CM

## 2022-06-16 DIAGNOSIS — C50.312 MALIGNANT NEOPLASM OF LOWER-INNER QUADRANT OF LEFT BREAST IN FEMALE, ESTROGEN RECEPTOR NEGATIVE (HCC): Primary | ICD-10-CM

## 2022-06-16 DIAGNOSIS — Z17.1 MALIGNANT NEOPLASM OF LOWER-INNER QUADRANT OF LEFT BREAST IN FEMALE, ESTROGEN RECEPTOR NEGATIVE (HCC): Primary | ICD-10-CM

## 2022-06-16 DIAGNOSIS — R74.8 ELEVATED LIVER ENZYMES: ICD-10-CM

## 2022-06-16 DIAGNOSIS — R91.8 LUNG NODULES: ICD-10-CM

## 2022-06-16 DIAGNOSIS — G62.9 NEUROPATHY: ICD-10-CM

## 2022-06-16 PROCEDURE — 99214 OFFICE O/P EST MOD 30 MIN: CPT | Performed by: INTERNAL MEDICINE

## 2022-06-16 NOTE — PROGRESS NOTES
MA Rooming Questions  Patient: Omari Kellogg  MRN: [id number]    Date: 6/16/2022        1. Do you have any new issues? Yes - having more Neuropathy in feet    2. Do you need any refills on medications?    no    3. Have you had any imaging done since your last visit?   no    4. Have you been hospitalized or seen in the emergency room since your last visit here? Yes - mammogram and u/s    5. Did the patient have a depression screening completed today?  No    No data recorded     PHQ-9 Given to (if applicable):               PHQ-9 Score (if applicable):                     [] Positive     []  Negative              Does question #9 need addressed (if applicable)                     [] Yes    []  No               Anikanino Vicente CMA

## 2022-06-16 NOTE — PROGRESS NOTES
Patient Name:  Gabriela Alicea  Patient :  1965  Patient MRN:  [id number]     Primary Oncologist: Raissa Mederos MD  Referring Provider: Mei Bass PA-C     Date of Service: 2022      Reason for Consult:  TNBC     Chief Complaint:    Chief Complaint   Patient presents with    Follow-up       Encounter Diagnoses   Name Primary?  Malignant neoplasm of lower-inner quadrant of left breast in female, estrogen receptor negative (HCC) Yes    Neuropathy     Lung nodules     Elevated liver enzymes     Dyspnea, unspecified type         HPI:   2021: She had with her  to the clinic today. Reported that she was found to have a mass in the left breast on routine mammogram.  Denied any axial lymphadenopathy. Denied any weight loss. Denied any new bone pains. Denied any focal weakness. Reported blurred vision, needs new glasses. Headaches lately. But reported that she recently quit drinking alcohol. Denied any chest pain or increase shortness of breath. Denied any abdominal pain, nausea vomiting, diarrhea, constipation    21: Mammogram:  Recommend spot gene synthesis compression views and possible ultrasound for   focal asymmetry in the left upper inner breast, middle depth.       BIRADS:   BIRADS - CATEGORY 0       Incomplete: Needs Additional Imaging Evaluation     11/10/21: Mammogram:  0.7 x 0.8 x 0.7 cm suspicious mass left breast 8 o'clock, about 3-4 cm from   the left nipple.  This likely correlates with the mammographic finding.    Ultrasound-guided biopsy is advised.       Recommend post biopsy mammographic correlation to confirm that it correlates   with the mammographic finding.       These results and recommendations were discussed with the patient by Dr. Shaheen Grace will be assisted to schedule the recommended biopsy by the breast   nurse navigator.  An order will be requested from her referring physician.       BIRADS:   BIRADS - CATEGORY 4B       Intermediate suspicion for malignancy.       Suspicious Abnormality. Biopsy should be considered at this time. 12/10/21:Final Pathologic Diagnosis:   Breast, left, 8 o'clock, ultrasound guided needle core   biopsy:   -  INVASIVE DUCTAL CARCINOMA (see comment, see stains   report). BREAST INVASIVE CARCINOMA SUMMARY - CORE BIOPSY   Procedure:  Ultrasound guided needle core biopsy. Laterality and tumor site:  Left breast at 8 o'clock. Tumor size:  7 mm. in greatest dimension within biopsy   material present. Histologic type:  Invasive ductal carcinoma.     Histologic Grade (Purcell): Grade 2,        -Tubular score 3, Nuclear score 3, Mitosis score 1 (7   /10 hpf). Ductal Carcinoma In Situ (DCIS):  Not present. Lobular Carcinoma In Situ (LCIS):  Not present.     Lympho-vascular invasion:  Not identified.     Microcalcifications:  Present.     Additional Findings:  None.     Ancillary studies: ER/PgR/Her2 results from the current   biopsy are as follows:   -ER by [facility]*: Negative (0% staining)   -PgR by IHC*: Negative (0% staining)   -Her2 by IHC*: Negative (Score 0)   -Her2 by FISH*:Negative   -Avg. #HER2 signals/nucleus: 1.8, Avg. #CEP17   signals/nucleus: 1.72, HER2/CEP17 ratio: 1.05      12/16/2021 CBC WBC of 4.8 hemoglobin of 12.6 hematocrit of 39.6 MCV of 100.5 and platelets of 675. Sodium of 138 potassium 4.6 BUN of 11 creatinine 0.4. LFTs with alk phos 155 ALT 54    12/23/2021 CT scan of the abdomen pelvis with IV contrast revealed couple of noncalcified micro nodules in the lower lobe of the right lung.     1/10/22: PORT placed    1/17/22: PET:  Approximate 1.1 cm mass of the left lower inner breast with minimal focal FDG   avidity which may correspond to the known primary.  There is no evidence of   metastatic disease.         C1D1 neoadjuvant AC started 1/20/22 Feb 2022 no clinical relevant variables detected on genetic testing    3/31/22: C1D1 taxol    6/9/22: last dose of taxol    6/10/22; mammogram and ultrasound. 1. Previously noted biopsy-proven neoplasm within the left breast at the 8   o'clock position is not visualized on today's exam.   2. No new mammographic evidence for malignancy in the left breast.        Past Medical History:     Hyperlipidemia, hypertension, depression                                                           Past Surgery History: Total abdominal hysterectomy, bilateral salpingo-oophorectomy                                                                          Social History:   Lives with a partner. Has 2 children 22 and 35, 2 daughters. Quit smoking about 25 years ago prior to which she smoked 1 pack/day for 7 years. Quit drinking alcohol on 12/2/2021 prior to which she consumed 4-5 beers per day and also whiskey sometimes. Currently on sick leave, was an  at [employer] Family History:    Father diagnosed with prostate cancer                                                                                               No Known Allergies    Current Outpatient Medications on File Prior to Visit   Medication Sig Dispense Refill    CLONIDINE HCL PO Take by mouth      lidocaine-prilocaine (EMLA) 2.5-2.5 % cream Apply topically to Mediport site 30-60 minutes prior to treatments. 30 g 1    dexamethasone (DECADRON) 4 MG tablet Two tablets (8 mg) po day before chemotherapy with taxol 2 tablet 0    dexamethasone (DECADRON) 2 MG tablet Take 1 tablet by mouth daily (with breakfast) for two days AFTER chemotherapy. Take 8 mg (4 tabs) the night before 1st chemo ONLY.  28 tablet 0    Cholecalciferol (VITAMIN D3 PO) Take 1 tablet by mouth daily      OLANZapine (ZYPREXA) 5 MG tablet One tablet po HS for four nights starting the night before chemotherapy 16 tablet 0    ondansetron (ZOFRAN) 8 MG tablet Take 1 tablet by mouth every 8 hours as needed for Nausea or Vomiting 30 tablet 1    FLUoxetine (PROZAC) 20 MG capsule Take 20 mg by mouth daily      ibuprofen (ADVIL;MOTRIN) 600 MG tablet Take 600 mg by mouth every 6 hours as needed for Pain      ATORVASTATIN CALCIUM PO Take by mouth      lisinopril (PRINIVIL;ZESTRIL) 20 MG tablet TAKE ONE TABLET BY MOUTH DAILY 90 tablet 1    acyclovir (ZOVIRAX) 400 MG tablet Take 1 tablet by mouth daily 90 tablet 1     No current facility-administered medications on file prior to visit. Interval history:6/16/22: She arrived with her daughter to the clinic today. Reported feet were erythematous. Reported she was on vacation last week and neuropathy was better but this week has been worse. Sometimes feels like holes and difficulty ambulating. Reported that she has been consuming 3-4 drinks per month. No tob smoking. Review of Systems:  As per internal history     Vital Signs: /88 (Site: Left Lower Arm, Position: Sitting, Cuff Size: Medium Adult)   Pulse 95   Temp 97.6 °F (36.4 °C) (Infrared)   Ht 5' 1\" (1.549 m)   Wt 255 lb (115.7 kg)   SpO2 92%   BMI 48.18 kg/m²     CONSTITUTIONAL: awake, alert, obese  EYES: IGNACIO, No pallor or any icterus  ENT: ATNC  NECK: No JVD, port in the right side  HEMATOLOGIC/LYMPHATIC: no cervical, supraclavicular or axillary lymphadenopathy   LUNGS: CTAB  CARDIOVASCULAR: s1s2 rrr no murmurs  ABDOMEN: soft ntnd bs pos  NEUROLOGIC: GI  SKIN: No rash  EXTREMITIES: no LE edema bilaterally  Breast, did not feel  left breast mass, did not appreciate any axillary lymphadenopathy.   No masses in the right breast or right axillary lymphadenopathy     Labs:  Hematology:  Lab Results   Component Value Date    WBC 4.8 06/08/2022    RBC 3.51 (L) 06/08/2022    HGB 11.7 (L) 06/08/2022    HCT 36.3 (L) 06/08/2022    .4 (H) 06/08/2022    MCH 33.3 (H) 06/08/2022    MCHC 32.2 06/08/2022    RDW 14.6 06/08/2022     06/08/2022    MPV 10.2 06/08/2022    SEGSPCT 71.4 (H) 06/08/2022    EOSRELT 2.1 06/08/2022    BASOPCT 0.6 06/08/2022    LYMPHOPCT 17.0 (L) 06/08/2022    MONOPCT 7.9 (H) 06/08/2022    SEGSABS 3.4 06/08/2022    EOSABS 0.1 06/08/2022    BASOSABS 0.0 06/08/2022    LYMPHSABS 0.8 06/08/2022    MONOSABS 0.4 06/08/2022    DIFFTYPE AUTOMATED DIFFERENTIAL 06/08/2022     No results found for: ESR  Chemistry:  Lab Results   Component Value Date     06/08/2022    K 4.3 06/08/2022     06/08/2022    CO2 25 06/08/2022    BUN 15 06/08/2022    CREATININE 0.4 (L) 06/08/2022    GLUCOSE 89 06/08/2022    CALCIUM 9.5 06/08/2022    PROT 6.4 06/08/2022    LABALBU 4.3 06/08/2022    BILITOT 0.9 06/08/2022    ALKPHOS 87 06/08/2022    AST 27 06/08/2022    ALT 31 06/08/2022    LABGLOM >60 06/08/2022    GFRAA >60 06/08/2022    AGRATIO 1.7 10/11/2016    GLOB 2.4 10/11/2016     No results found for: MMA, LDH, HOMOCYSTEINE  No components found for: LD  Lab Results   Component Value Date    TSHHS 1.870 06/08/2022    T4FREE 1.28 06/08/2022    FT3 3.9 06/08/2022     Immunology:  Lab Results   Component Value Date    PROT 6.4 06/08/2022     No results found for: Blease Maklory, KLFLCR  No results found for: B2M  Coagulation Panel:  No results found for: PROTIME, INR, APTT, DDIMER  Anemia Panel:  No results found for: [id number], FOLATE  Tumor Markers:  Lab Results   Component Value Date    LABCA2 29.6 01/19/2022        Observations:  ECOG:  No data recorded       Assessment & Plan:                                                          Triple negative breast cancer,biopsy on 12/1/2021,  left, 1.1 cm, grade 2, no evidence of any axillary  lymphadenopathy per available imaging studies. PET scan jan 2022 with no other met disease. Ca 27-29 was 29.6 in jan 2021 . Discussed the findings, diagnosis, possible staging and recommended neoadjuvant chemotherapy with AC followed by T followed by surgical resection followed by radiation pending surgical modality(Note genetic testing negative, Pt yet undecided).   Discussed the adverse effects of doxorubicin, cyclophosphamide and Taxol. Answered all questions. PORT completed and OCM completed. Echocardiogram jan 12 2022 with preserved EF.   C1D1 AC started jan 20 2022. Completed march 3 2022   Dose adjustments were  made according to adverse effects and cytopenias. Started Neoadjuvant Taxol march 31/2022, will monitor for adverse effects. VUS MSH6 p.[id number]  Taxol dose has been modified  sec to neuropathy, last dose was held sec to progressive neuropathy  Will follow up on surgical pathology for further treatment plan  Will follow per NCCN guidelines after completion of all the treatments    NC anemia: Most probably sec to chemotherapy. Will monitor for now. Transfusion support as needed    Neutropenia: is  most probably sec to chemotherapy as well. Expect counts to improve. Recommend complete cessation of ETOH. Constipation: recommended stool softener and laxatives as needed. ?Seasonal allergies: Recommended trial of claritin    Neuropathy:  Note h/o ETOH. Defers pharmacological intervention at this point . Recommend epsom salt soaks and also discussed topical compounded. As above mentioned hold last dose of taxol. Continue other medical care. Thank you for letting us be part of the care and will follow along. Discussed above findings and plan with her and she voiced understanding. Answered all her questions. Discussed healthy lifestyle including healthy diet, regular exercise as tolerated. Also discussed importance of being up-to-date with age-appropriate screening tools. Quit smoking and taking THC  Recommend ETOH cessation    Recommend follow-up with primary care physician and other specialists. Please do not hesitate to contact us if you need further information.     Return to clinic  July 2022  or earlier if new Sx    REX

## 2022-07-07 ENCOUNTER — HOSPITAL ENCOUNTER (OUTPATIENT)
Dept: INFUSION THERAPY | Age: 57
Discharge: HOME | End: 2022-07-07

## 2022-07-07 ENCOUNTER — OFFICE VISIT (OUTPATIENT)
Dept: ONCOLOGY | Age: 57
End: 2022-07-07
Payer: COMMERCIAL

## 2022-07-07 VITALS
WEIGHT: 251.2 LBS | SYSTOLIC BLOOD PRESSURE: 145 MMHG | DIASTOLIC BLOOD PRESSURE: 83 MMHG | BODY MASS INDEX: 47.43 KG/M2 | TEMPERATURE: 97.5 F | HEART RATE: 90 BPM | OXYGEN SATURATION: 97 % | HEIGHT: 61 IN

## 2022-07-07 DIAGNOSIS — D70.1 AGRANULOCYTOSIS SECONDARY TO CANCER CHEMOTHERAPY (CODE) (HCC): ICD-10-CM

## 2022-07-07 DIAGNOSIS — R74.8 ELEVATED LIVER ENZYMES: ICD-10-CM

## 2022-07-07 DIAGNOSIS — G62.9 NEUROPATHY: ICD-10-CM

## 2022-07-07 DIAGNOSIS — R91.8 LUNG NODULES: ICD-10-CM

## 2022-07-07 DIAGNOSIS — Z17.1 MALIGNANT NEOPLASM OF LOWER-INNER QUADRANT OF LEFT BREAST IN FEMALE, ESTROGEN RECEPTOR NEGATIVE (HCC): Primary | ICD-10-CM

## 2022-07-07 DIAGNOSIS — C50.312 MALIGNANT NEOPLASM OF LOWER-INNER QUADRANT OF LEFT BREAST IN FEMALE, ESTROGEN RECEPTOR NEGATIVE (HCC): Primary | ICD-10-CM

## 2022-07-07 PROCEDURE — 99214 OFFICE O/P EST MOD 30 MIN: CPT | Performed by: INTERNAL MEDICINE

## 2022-07-07 NOTE — PROGRESS NOTES
Patient Name:  Trev Renee  Patient :  1965  Patient MRN:  [id number]     Primary Oncologist: Ela Briseno MD  Referring Provider: Nadeem Jefferson PA-C     Date of Service: 2022      Reason for Consult:  TNBC     Chief Complaint:    Chief Complaint   Patient presents with    Follow-up       Encounter Diagnoses   Name Primary? 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 Reported that she was found to have a mass in the left breast on routine mammogram.  Denied any axial lymphadenopathy. Denied any weight loss. Denied any new bone pains. Denied any focal weakness. Reported blurred vision, needs new glasses. Headaches lately. But reported that she recently quit drinking alcohol. Denied any chest pain or increase shortness of breath. Denied any abdominal pain, nausea vomiting, diarrhea, constipation    21: Mammogram:  Recommend spot gene synthesis compression views and possible ultrasound for   focal asymmetry in the left upper inner breast, middle depth.       BIRADS:   BIRADS - CATEGORY 0       Incomplete: Needs Additional Imaging Evaluation     11/10/21: Mammogram:  0.7 x 0.8 x 0.7 cm suspicious mass left breast 8 o'clock, about 3-4 cm from   the left nipple.  This likely correlates with the mammographic finding.    Ultrasound-guided biopsy is advised.       Recommend post biopsy mammographic correlation to confirm that it correlates   with the mammographic finding.       These results and recommendations were discussed with the patient by Dr. Aaron Freemanist will be assisted to schedule the recommended biopsy by the breast   nurse navigator.  An order will be requested from her referring physician.       BIRADS:   BIRADS - CATEGORY 4B       Intermediate suspicion for malignancy.       Suspicious Abnormality. Biopsy should be considered at this time. 12/10/21:Final Pathologic Diagnosis:   Breast, left, 8 o'clock, ultrasound guided needle core   biopsy:   -  INVASIVE DUCTAL CARCINOMA (see comment, see stains   report). BREAST INVASIVE CARCINOMA SUMMARY - CORE BIOPSY   Procedure:  Ultrasound guided needle core biopsy. Laterality and tumor site:  Left breast at 8 o'clock. Tumor size:  7 mm. in greatest dimension within biopsy   material present. Histologic type:  Invasive ductal carcinoma.     Histologic Grade (Jennifer): Grade 2,        -Tubular score 3, Nuclear score 3, Mitosis score 1 (7   /10 hpf). Ductal Carcinoma In Situ (DCIS):  Not present. Lobular Carcinoma In Situ (LCIS):  Not present.     Lympho-vascular invasion:  Not identified.     Microcalcifications:  Present.     Additional Findings:  None.     Ancillary studies: ER/PgR/Her2 results from the current   biopsy are as follows:   -ER by [facility]*: Negative (0% staining)   -PgR by IHC*: Negative (0% staining)   -Her2 by IHC*: Negative (Score 0)   -Her2 by FISH*:Negative   -Avg. #HER2 signals/nucleus: 1.8, Avg. #CEP17   signals/nucleus: 1.72, HER2/CEP17 ratio: 1.05      12/16/2021 CBC WBC of 4.8 hemoglobin of 12.6 hematocrit of 39.6 MCV of 100.5 and platelets of 266. Sodium of 138 potassium 4.6 BUN of 11 creatinine 0.4. LFTs with alk phos 155 ALT 54    12/23/2021 CT scan of the abdomen pelvis with IV contrast revealed couple of noncalcified micro nodules in the lower lobe of the right lung.     1/10/22: PORT placed    1/17/22: PET:  Approximate 1.1 cm mass of the left lower inner breast with minimal focal FDG   avidity which may correspond to the known primary.  There is no evidence of   metastatic disease.         C1D1 neoadjuvant AC started 1/20/22 Feb 2022 no clinical relevant variables detected on genetic testing    3/31/22: C1D1 taxol    6/9/22: last dose of taxol    6/10/22; mammogram and ultrasound. 1. Previously noted biopsy-proven neoplasm within the left breast at the 8   o'clock position is not visualized on today's exam.   2. No new mammographic evidence for malignancy in the left breast.        Past Medical History:     Hyperlipidemia, hypertension, depression                                                           Past Surgery History: Total abdominal hysterectomy, bilateral salpingo-oophorectomy                                                                          Social History:   Lives with a partner. Has 2 children 22 and 35, 2 daughters. Quit smoking about 25 years ago prior to which she smoked 1 pack/day for 7 years. Quit drinking alcohol on 12/2/2021 prior to which she consumed 4-5 beers per day and also whiskey sometimes. Currently on sick leave, was an  at [employer] Family History:    Father diagnosed with prostate cancer                                                                                               No Known Allergies    Current Outpatient Medications on File Prior to Visit   Medication Sig Dispense Refill    lidocaine-prilocaine (EMLA) 2.5-2.5 % cream Apply topically to Mediport site 30-60 minutes prior to treatments. 30 g 1    Cholecalciferol (VITAMIN D3 PO) Take 1 tablet by mouth daily      FLUoxetine (PROZAC) 20 MG capsule Take 20 mg by mouth daily      ibuprofen (ADVIL;MOTRIN) 600 MG tablet Take 600 mg by mouth every 6 hours as needed for Pain      ATORVASTATIN CALCIUM PO Take by mouth      lisinopril (PRINIVIL;ZESTRIL) 20 MG tablet TAKE ONE TABLET BY MOUTH DAILY 90 tablet 1    acyclovir (ZOVIRAX) 400 MG tablet Take 1 tablet by mouth daily 90 tablet 1     No current facility-administered medications on file prior to visit.      Interval history:7/7/22: She arrived with her  to the clinic today. Reported neuropathy is feet getting slightly better. No falls. No chest pain. No new breast mass. She had scheduled this visit as she is now thinking of lumpectomy instead of mastectomy. She had questions regarding radiation. Review of Systems:  As per internal history     Vital Signs: BP (!) 145/83 (Site: Right Upper Arm, Position: Sitting, Cuff Size: Medium Adult)   Pulse 90   Temp 97.5 °F (36.4 °C) (Infrared)   Ht 5' 1\" (1.549 m)   Wt 251 lb 3.2 oz (113.9 kg)   SpO2 97%   BMI 47.46 kg/m²     CONSTITUTIONAL: awake, alert, obese  EYES: IGNACIO, No pallor or any icterus  ENT: ATNC  NECK: No JVD, port in the right side  HEMATOLOGIC/LYMPHATIC: no cervical, supraclavicular or axillary lymphadenopathy   LUNGS: CTAB  CARDIOVASCULAR: s1s2 rrr no murmurs  ABDOMEN: soft ntnd bs pos  NEUROLOGIC: GI  SKIN: No rash  EXTREMITIES: no LE edema bilaterally  Breast, did not feel  left breast mass, did not appreciate any axillary lymphadenopathy.   No masses in the right breast or right axillary lymphadenopathy     Labs:  Hematology:  Lab Results   Component Value Date    WBC 4.8 06/08/2022    RBC 3.51 (L) 06/08/2022    HGB 11.7 (L) 06/08/2022    HCT 36.3 (L) 06/08/2022    .4 (H) 06/08/2022    MCH 33.3 (H) 06/08/2022    MCHC 32.2 06/08/2022    RDW 14.6 06/08/2022     06/08/2022    MPV 10.2 06/08/2022    SEGSPCT 71.4 (H) 06/08/2022    EOSRELPCT 2.1 06/08/2022    BASOPCT 0.6 06/08/2022    LYMPHOPCT 17.0 (L) 06/08/2022    MONOPCT 7.9 (H) 06/08/2022    SEGSABS 3.4 06/08/2022    EOSABS 0.1 06/08/2022    BASOSABS 0.0 06/08/2022    LYMPHSABS 0.8 06/08/2022    MONOSABS 0.4 06/08/2022    DIFFTYPE AUTOMATED DIFFERENTIAL 06/08/2022     No results found for: ESR  Chemistry:  Lab Results   Component Value Date     06/08/2022    K 4.3 06/08/2022     06/08/2022    CO2 25 06/08/2022    BUN 15 06/08/2022    CREATININE 0.4 (L) 06/08/2022    GLUCOSE 89 06/08/2022    CALCIUM 9.5 06/08/2022    PROT 6.4 06/08/2022 LABALBU 4.3 06/08/2022    BILITOT 0.9 06/08/2022    ALKPHOS 87 06/08/2022    AST 27 06/08/2022    ALT 31 06/08/2022    LABGLOM >60 06/08/2022    GFRAA >60 06/08/2022    AGRATIO 1.7 10/11/2016    GLOB 2.4 10/11/2016     No results found for: MMA, LDH, HOMOCYSTEINE  No components found for: LD  Lab Results   Component Value Date    TSHHS 1.870 06/08/2022    T4FREE 1.28 06/08/2022    FT3 3.9 06/08/2022     Immunology:  Lab Results   Component Value Date    PROT 6.4 06/08/2022     No results found for: BOBO ValdezLCR  No results found for: B2M  Coagulation Panel:  No results found for: PROTIME, INR, APTT, DDIMER  Anemia Panel:  No results found for: [id number], FOLATE  Tumor Markers:  Lab Results   Component Value Date    LABCA2 29.6 01/19/2022        Observations:  ECOG:  No data recorded       Assessment & Plan:                                                          Triple negative breast cancer,biopsy on 12/1/2021,  left, 1.1 cm, grade 2, no evidence of any axillary  lymphadenopathy per available imaging studies. PET scan jan 2022 with no other met disease. Ca 27-29 was 29.6 in jan 2021 . Discussed the findings, diagnosis, possible staging and recommended neoadjuvant chemotherapy with AC followed by T followed by surgical resection followed by radiation pending surgical modality(Note genetic testing negative). Discussed the adverse effects of doxorubicin, cyclophosphamide and Taxol. Answered all questions. PORT completed and OCM completed. Echocardiogram jan 12 2022 with preserved EF.   C1D1 AC started jan 20 2022. Completed march 3 2022   Dose adjustments were  made according to adverse effects and cytopenias. Started Neoadjuvant Taxol march 31/2022, completed 6/9/22  VUS MSH6 p.[id number]  Will follow up on surgical pathology for further treatment plan.   Has a lengthy discussion regarding different surgical options but suggested that  Dr Shirley Machado will be able to answer   Will follow per NCCN guidelines after completion of all the treatments    NC anemia: Most probably sec to chemotherapy. Will monitor for now. Transfusion support as needed    Neutropenia: is  most probably sec to chemotherapy as well. Expect counts to improve. Recommend complete cessation of ETOH. Constipation: recommended stool softener and laxatives as needed. ?Seasonal allergies: Recommended trial of claritin    Neuropathy:  Note h/o ETOH. Defers pharmacological intervention at this point . Recommend epsom salt soaks and also discussed topical compounded. As above mentioned hold last dose of taxol. Continue other medical care. Thank you for letting us be part of the care and will follow along. Discussed above findings and plan with her and she voiced understanding. Answered all her questions. Discussed healthy lifestyle including healthy diet, regular exercise as tolerated. Also discussed importance of being up-to-date with age-appropriate screening tools. Quit smoking and taking THC  Recommend ETOH cessation    Recommend follow-up with primary care physician and other specialists. Please do not hesitate to contact us if you need further information.     Return to clinic  August 2022 or earlier if new Sx    REX

## 2022-07-29 ENCOUNTER — TELEPHONE (OUTPATIENT)
Dept: ONCOLOGY | Age: 57
End: 2022-07-29

## 2022-07-29 NOTE — TELEPHONE ENCOUNTER
Patient called and states that our office re scheduled her apt for 08/01/22 for 08/22/22. Patient is having surgery today and would like to know when she should start radiation.

## 2022-07-29 NOTE — TELEPHONE ENCOUNTER
Called and LVM with patient. Instructed we will not start radiation until at least one month after her surgery today. Will set up referral to RT doctor and get her scheduled in near future.

## 2022-08-22 ENCOUNTER — OFFICE VISIT (OUTPATIENT)
Dept: ONCOLOGY | Age: 57
End: 2022-08-22
Payer: COMMERCIAL

## 2022-08-22 ENCOUNTER — HOSPITAL ENCOUNTER (OUTPATIENT)
Dept: RADIATION ONCOLOGY | Age: 57
Discharge: HOME | End: 2022-08-22
Payer: COMMERCIAL

## 2022-08-22 ENCOUNTER — HOSPITAL ENCOUNTER (OUTPATIENT)
Dept: INFUSION THERAPY | Age: 57
End: 2022-08-22

## 2022-08-22 VITALS
OXYGEN SATURATION: 93 % | HEIGHT: 61 IN | BODY MASS INDEX: 46.82 KG/M2 | TEMPERATURE: 97.1 F | WEIGHT: 248 LBS | DIASTOLIC BLOOD PRESSURE: 83 MMHG | SYSTOLIC BLOOD PRESSURE: 142 MMHG | HEART RATE: 77 BPM

## 2022-08-22 VITALS
WEIGHT: 248 LBS | SYSTOLIC BLOOD PRESSURE: 142 MMHG | HEART RATE: 77 BPM | OXYGEN SATURATION: 93 % | BODY MASS INDEX: 46.82 KG/M2 | DIASTOLIC BLOOD PRESSURE: 83 MMHG | HEIGHT: 61 IN | TEMPERATURE: 97.1 F

## 2022-08-22 DIAGNOSIS — Z17.1 MALIGNANT NEOPLASM OF BREAST IN FEMALE, ESTROGEN RECEPTOR NEGATIVE, UNSPECIFIED LATERALITY, UNSPECIFIED SITE OF BREAST (HCC): Primary | ICD-10-CM

## 2022-08-22 DIAGNOSIS — R91.8 LUNG NODULES: ICD-10-CM

## 2022-08-22 DIAGNOSIS — C50.919 MALIGNANT NEOPLASM OF BREAST IN FEMALE, ESTROGEN RECEPTOR NEGATIVE, UNSPECIFIED LATERALITY, UNSPECIFIED SITE OF BREAST (HCC): Primary | ICD-10-CM

## 2022-08-22 DIAGNOSIS — G62.9 NEUROPATHY: ICD-10-CM

## 2022-08-22 PROCEDURE — 99205 OFFICE O/P NEW HI 60 MIN: CPT | Performed by: RADIOLOGY

## 2022-08-22 PROCEDURE — 99214 OFFICE O/P EST MOD 30 MIN: CPT | Performed by: INTERNAL MEDICINE

## 2022-08-22 NOTE — CONSULTS
Radiation Oncology Consultation  Encounter Date: 2022 2:51 PM    Ms. Kimani Wilburn is a 49 y.o. female  : 1965  MRN: [id number]  Acct Number: [de-identified]  Requesting Provider: No att. providers found        CONSULTANT: Anette Bagley MD    PHYSICIANS:   Primary Care: FAISAL Soriano M.D.  [address] [facility]    Oleg Mcnamara MD      DIAGNOSIS:      Cancer Staging  Malignant neoplasm of lower-inner quadrant of breast in female, estrogen receptor negative ([facility] [address]  Staging form: Breast, AJCC 8th Edition  - Clinical: Stage IB (cT1c, cN0, cM0, G2, ER-, MO-, HER2-) - Signed by Anette Bagley MD on 2022         TREATMENT COURSE:  Oncology History   Malignant neoplasm of lower-inner quadrant of breast in female, estrogen receptor negative ([facility] [address]   2022 - 2022 Chemotherapy    ACx4; Taxol     2022 -  Cancer Staged    Staging form: Breast, AJCC 8th Edition  - Clinical: Stage IB (cT1c, cN0, cM0, G2, ER-, MO-, HER2-)       2022 Surgery    Lumpectomy with sentinel lymph node sampling:    No residual malignancy. All 3 sentinel nodes were negative. HPI:   Today I had the privilege of seeing Kimani Wilburn in consultation. As you recall, Kimani Wilburn is a 49 y.o. female who was recently found to have an invasive triple receptor negative left-sided breast cancer. She is previously in a fairly good state of health, however underwent routine bilateral screening mammography on 2021 at which time she was noted to have the right breast to be unremarkable, however, a focal asymmetry in the superior inner left breast for which additional views were recommended and obtained on 11/10/2021. She was then noted to have a persistent 1 cm mass in the inner central left breast with 3 punctate microcalcifications.   Ultrasound of the 8 o'clock position of the left breast was noted to have a 7 x 8 mm hypoechoic mass with irregular margins without significant posterior shadowing for which biopsy was recommended and obtained on 11/10/2021 with ultrasound guidance revealing invasive ductal carcinoma 7 mm in greatest dimension grade 2 triple receptor negative. PET/CT from 1/17/2022 showed a 1.1 cm mass of the left lower inner breast with minimal focal FDG activity with no evidence of metastatic disease. She was initiated on neoadjuvant chemotherapy with Adriamycin/Cytoxan on 1/20/2022 followed by Taxol which was completed on 6/9/2022 on 7/29/2022, she underwent lumpectomy with sentinel node sampling which time she was noted to have no residual malignancy. All 3 sentinel nodes without evidence of metastasis. She presents today for consideration of her treatment options. She reports the lesion was nonpalpable. She denies any associated bloody nipple discharge, skin redness, thickening, dimpling, or arm swelling. Overall, she reports she tolerated the chemotherapy quite well. She did gain approximately 20 pounds during the course of her chemotherapy and reports continued paresthesias of her feet. She reports that surgery, overall, went quite well for her also. She has mild postoperative tenderness. She states her energy level is approximately 50% back to normal.  She has not noticed any new lumps or bumps anywhere throughout her body and denies new onset of bony pain. She reports she has been able to work full-time throughout the course of her treatments so far.       Past Medical History:   Diagnosis Date    Breast cancer ([address] 12/2021    left    Depression     Hx antineoplastic chemo     Hyperlipidemia     Hypertension     Hypothyroidism         Past Surgical History:   Procedure Laterality Date    BREAST LUMPECTOMY Left 2022    HYSTERECTOMY, VAGINAL  12/02/2021    PORT SURGERY      US BREAST NEEDLE BIOPSY LEFT Left 12/10/2021    US BREAST NEEDLE BIOPSY LEFT 12/10/2021 Liz Mckeon  [facility]       Family History   Problem Relation Age of Onset    Breast Cancer Paternal Aunt     Ovarian Cancer Neg Hx        Social History     Socioeconomic History    Marital status:      Spouse name: Not on file    Number of children: 2    Years of education: Not on file    Highest education level: Not on file   Occupational History    Not on file   Tobacco Use    Smoking status: Never    Smokeless tobacco: Former    Tobacco comments:     at age 14   Substance and Sexual Activity    Alcohol use: Yes     Alcohol/week: 5.0 standard drinks     Types: 5 Cans of beer per week    Drug use: No    Sexual activity: Yes     Partners: Male   Other Topics Concern    Not on file   Social History Narrative    Not on file     Social Determinants of Health     Financial Resource Strain: Not on file   Food Insecurity: Not on file   Transportation Needs: Not on file   Physical Activity: Not on file   Stress: Not on file   Social Connections: Not on file   Intimate Partner Violence: Not on file   Housing Stability: Not on file           ALLERGIES: No Known Allergies     Current Outpatient Medications   Medication Sig Dispense Refill    Cholecalciferol (VITAMIN D3 PO) Take 1 tablet by mouth daily      FLUoxetine (PROZAC) 20 MG capsule Take 20 mg by mouth daily      ibuprofen (ADVIL;MOTRIN) 600 MG tablet Take 600 mg by mouth every 6 hours as needed for Pain      ATORVASTATIN CALCIUM PO Take by mouth      lisinopril (PRINIVIL;ZESTRIL) 20 MG tablet TAKE ONE TABLET BY MOUTH DAILY 90 tablet 1    acyclovir (ZOVIRAX) 400 MG tablet Take 1 tablet by mouth daily 90 tablet 1     No current facility-administered medications for this encounter.      No outpatient medications have been marked as taking for the 8/22/22 encounter [facility] Encounter) with Anette Bagley MD.          LABORATORY STUDIES:   CBC:   Lab Results   Component Value Date/Time    WBC 4.8 06/08/2022 10:54 AM    RBC 3.51 06/08/2022 10:54 AM    HGB 11.7 06/08/2022 10:54 AM    HCT 36.3 06/08/2022 10:54 AM signals/nucleus: 1.8, Avg. #CEP17   signals/nucleus: 1.72, HER2/CEP17 ratio: 1.05     RADIOLOGIC STUDIES:     US BREAST LIMITED LEFT    Narrative  EXAMINATION:  DIAGNOSTIC DIGITAL LEFT BREAST MAMMOGRAM WITH TOMOSYNTHESIS; TARGETED  ULTRASOUND OF THE LEFT BREAST    6/10/2022 2:02 pm; 6/10/2022 2:24 pm    TECHNIQUE:  Diagnostic mammography of the left breast was performed with tomosynthesis. 2D standard and 3D tomosynthesis combination imaging performed through the  left breast.  Computer aided detection was utilized in the interpretation of  this exam.; Target ultrasound of the left breast was performed. VIEWS: CC, MLO, spot compression and true lateral views    COMPARISON:  12/10/2021, 11/10/2021, 9/27/2021, 9/11/2019    HISTORY:  ORDERING SYSTEM PROVIDED HISTORY: Malignant neoplasm of lower-inner quadrant  of breast in female, estrogen receptor negative, unspecified laterality [facility])  TECHNOLOGIST PROVIDED HISTORY: With ultrasound if indicated  Is the patient pregnant?->No;    ORDERING SYSTEM PROVIDED HISTORY: Malignant neoplasm of lower-inner quadrant  of breast in female, estrogen receptor negative, unspecified laterality ([facility] [address]  TECHNOLOGIST PROVIDED HISTORY: With ultrasound if indicated    FINDINGS:    Mammogram:    There are scattered areas of fibroglandular density. Previously noted mass  within the medial left breast is not well visualized on the mammographic  images or spot compression images. Biopsy clip is noted within the left  breast.  No new dominant masses, suspicious microcalcifications or areas of  architectural distortion. Ultrasound: Focused ultrasound of the left breast was performed. Previously noted mass  at the 8 o'clock position is not visualized. Biopsy clip is noted in this  region. No other mass or fluid collection. No abnormal axillary adenopathy. Impression  1.  Previously noted biopsy-proven neoplasm within the left breast at the 8  o'clock position is not visualized on today's exam.  2. No new mammographic evidence for malignancy in the left breast.    BIRADS:  BIRADS - CATEGORY 6    Known Biopsy-Proven Cancer. Appropriate action should be taken. OVERALL ASSESSMENT - KNOWN BIOPSY PROVEN MALIGNANCY. PET CT SKULL BASE TO MID THIGH    Narrative  EXAMINATION:  WHOLE BODY PET/CT    1/17/2022    TECHNIQUE:  Following IV injection of 17.21 mCi of F-18 FDG, PET  tumor imaging was  acquired from the base of the skull to the mid thighs. Computed tomography  was used for purposes of attenuation correction and anatomic localization. Fusion imaging was utilized for interpretation. Uptake time 60 to 90 min. Glucose level 108 mg/dl. COMPARISON:  None    HISTORY:  ORDERING SYSTEM PROVIDED HISTORY: Triple negative malignant neoplasm of  breast [facility])  TECHNOLOGIST PROVIDED HISTORY:  Reason for exam:->[facility]    Biopsy confirmed left IDC at the 8 o'clock position in the left breast    FINDINGS:  HEAD/NECK: No abnormal FDG avidity. CHEST: 1.1 cm mass of the left lower inner breast on series 2, image 68 with  minimal focal FDG avidity slightly above background breast tissue, SUV max  0.82. ABDOMEN/PELVIS: No abnormal FDG avidity. BONES/SOFT TISSUE: No abnormal FDG avidity. INCIDENTAL CT FINDINGS: Non avid coarsely calcified 1.9 cm lesion of the  anterior mediastinum on series 2, image 53, likely a benign lymph node or  complicated cystic lesion. Changes of mesenteric lipodystrophy. Impression  Approximate 1.1 cm mass of the left lower inner breast with minimal focal FDG  avidity which may correspond to the known primary. There is no evidence of  metastatic disease. REVIEW OF SYSTEMS:   Constitutional:  Patient denies fevers, rigors, or drenching night sweats.   Eyes:  The patient denies any recent visual changes, diplopia, or cataracts  ENMT:  The patient denies any ear pain, hearing changes, or nosebleeds  Respiratory:  The patient denies any SOB, hemoptysis, or green sputum production  Cardiovascular: The patient denies any chest pain, leg edema, or fainting spells  GI:  Patient denies nausea, vomiting, diarrhea, melena, or hematochezia  : Patient denies dysuria, hematuria, or urinary incontinence. Integumentary: patient denies skin rashes, pruritis, or arm edema  Endocrine:  Patient denies any diabetic problems. Hx hypothyroidism  Neurologic: Patient denies headaches, focal weakness, or disorientation  Psychiatric: The patient denies any depression, anxiety, or rapid mood swings. PHYSICAL EXAMINATION:   VITAL SIGNS: BP (!) 142/83   Pulse 77   Temp 97.1 °F (36.2 °C) (Temporal)   Ht 5' 1\" (1.549 m)   Wt 248 lb (112.5 kg)   SpO2 93%   BMI 46.86 kg/m²   ECOG PERFORMANCE STATUS: 0  PAIN RATIN    GENERAL: Pleasant well-developed adult in no acute distress. Alert and oriented ×3  SKIN: Warm and dry, without jaundice, ecchymoses, or petechiae. HEENT: Normocephalic, atraumatic. Pupils are round and symmetric. Sclerae anicteric  NECK:  No JVD; Supple. No cervical, supraclavicular, or infraclavicular lymphadenopathy is palpable. A Mediport is palpable in the right infraclavicular region. LUNGS: Bilaterally clear to auscultation. No rales, rhonci, or wheezing are present. Good inspiratory effort, no accessory muscle use. CARDIAC: Regular rate and rhythm, without murmurs, clicks, or gallops   Breast examination: Right breast: No masses are palpable. No nipple discharge is elicited. Left breast: Healing circumareolar incision medially with mild fluid loculation. No axillary lymphadenopathy is palpable. ABDOMEN: Normoactive bowels sounds are present. Soft. Non-tender and non-distended. No hepatosplenomegaly or masses are present. EXTREMITIES: No cyanosis, clubbing or edema is present. FROM  NEUROLOGIC: Gait unremarkable. The patient is alert and oriented. CN II-XII are grossly intact.   Sensation to light touch is intact and symmetric in the fingers and feet.  Muscle strength is 5/5 in both the upper and lower extremities. IMPRESSION/PLAN:  Juanita Larson is a 49 y.o. female who was recently found to have a stage Ib triple receptor negative invasive ductal carcinoma of the left breast status post neoadjuvant Adriamycin/Cytoxan and Taxol followed by breast conserving surgery with no residual malignancy. I have reviewed the patient's radiographic images. The treatment options were discussed in detail with the patient. I do recommend adjuvant left breast radiation therapy. The potential acute side effects and chronic complications of radiation therapy to the left breast were discussed in detail with the patient. The patient voiced understanding, and the patient's questions were answered. The patient has decided to proceed with radiation therapy. I will schedule a simulation to occur at the next available time and will plan on initiating radiation therapy shortly thereafter. Thank-you for allowing me to participate in the care of this very pleasant patient.           MEDICAL DECISION MAKING    Number/Complexity of Problems Addressed  [] 1 self-limited of minor problem (17923/49337)  [] >=2 self-limited or minor problems, 1 stable chronic illness, 1 acute/uncomplicated illness/injury (09828/95299)  [x]Chronic illnesses with exacerbation/progression/side effects of treatment, >=2 stable chronic illnesses, 1 undiagnosed new problem with uncertain prognosis, 1 acute illness with systemic symptoms, 1 acute complicated injury (63061/25748)  []Chronic illnesses with severe exacerbation/progression/treatment side effects, acute or chronic illness or injury that poses a threat to life or bodily function (11385/07819)    Amount and/or Complexity of Data Reviewed  [] Minimal or none (21906/21581)  [] Limited - meets 1/2 categories (71207/19520)  At least 2 of: review of prior external notes from each unique source, review results of each unique test, ordering of each unique test  Assessment requiring an independent historian  [] Moderate - meets 1/3 categories (03155/70727)  Any 3 of: Review of prior external notes from each unique source, review results of each unique test, ordering of each unique test, assessment requiring an independent historian  Independent interpretation of tests  Discussion of management or test interpretation  [x] Extensive - meets 2/3 categories (94999/22779)  Any 3 of: Review of prior external notes from each unique source, review results of each unique test, ordering of each unique test, assessment requiring an independent historian  Independent interpretation of tests  Discussion of management or test interpretation    Risk of complications and/or morbidity/mortality of patient management  [] Minimal (47054/01517)  [] Low (43127/45400)  [] Moderate (53828/05792)  Examples: Rx drug management, decision regarding minor surgery with identified patient or procedure risk factors, decision regarding elective major surgery without identified patient or procedure risk factors, diagnosis or treatment significantly limited by social determinants of health  [x] High (85657/11267)  Examples: drug therapy requiring intensive monitoring for toxicity, decision regarding elective major surgery with identified patient/procedure risk factors, decision regarding emergency major surgery, decision regarding hospitalization, decision for DNR or de-escalation of care because of poor prognosis      LEVEL OF MDM (based on 2/3 above categories)  [] Straightforward (86715/59012)  [] Low (22559/04214)  [] Moderate (57479/84212)  [x] High (24865/28760)    Electronically signed by Laura Eason MD on 8/22/2022 at 2:51 PM

## 2022-08-22 NOTE — PLAN OF CARE
Radiation education and handouts given. Side effects and management reviewed with pt. All questions answered and pt voices understanding . Nursing Care Plan for Breast Radiation    Pain related to cancer diagnosis and treatment. Interventions   Pain assessment. Monitor pharmacological pain medication. Teach relaxation and repositioning techniques. Expected Outcome   Maintain patient's acceptable pain level or <5 on pain scale. Knowledge deficit related to diagnosis and treatment plan. Interventions   Assess patient's ability to comprehend diagnosis and treatment plan. Provide educational materials and teaching regarding plan of care. Provide emotional support and continued education. Refer to psychosocial coordinator if further assistance needed. Expected Outcome   Patient voices understanding of diagnosis and treatment plan. Altered respiratory status related to diagnosis and treatment. Interventions   Assess respiratory status, note changes. Educate patient on symptoms to report to staff. Refer to smoking cessation program if needed. Expected Outcome   No respiratory complications, patient with SPO2 >90% or equal to baseline. Altered skin integrity related to treatment. Interventions   Evaluation of skin integrity at therapy site. Advise patient on appropriate skin care. Instruct patient on recommended lotions/ointments/creams/dressing changes to use on therapy site. Expected Outcome   Minimize adverse skin reaction/infection at treatment site. To re-evaluate weekly during radiation treatments.

## 2022-08-22 NOTE — PROGRESS NOTES
Patient Name:  Alexandro Bui  Patient :  1965  Patient MRN:  [id number]     Primary Oncologist: Maritza Bah MD  Referring Provider: Yunior Donovan PA-C     Date of Service: 2022      Reason for Consult:  TNBC     Chief Complaint:    Chief Complaint   Patient presents with    Follow-up       Encounter Diagnoses   Name Primary? Malignant neoplasm of breast in female, estrogen receptor negative, unspecified laterality, unspecified site of breast ([facility] [address] Yes    Neuropathy     Lung nodules         HPI:   2021: She had with her  to the clinic today. Reported that she was found to have a mass in the left breast on routine mammogram.  Denied any axial lymphadenopathy. Denied any weight loss. Denied any new bone pains. Denied any focal weakness. Reported blurred vision, needs new glasses. Headaches lately. But reported that she recently quit drinking alcohol. Denied any chest pain or increase shortness of breath. Denied any abdominal pain, nausea vomiting, diarrhea, constipation    21: Mammogram:  Recommend spot gene synthesis compression views and possible ultrasound for   focal asymmetry in the left upper inner breast, middle depth. BIRADS:   BIRADS - CATEGORY 0       Incomplete: Needs Additional Imaging Evaluation     11/10/21: Mammogram:  0.7 x 0.8 x 0.7 cm suspicious mass left breast 8 o'clock, about 3-4 cm from   the left nipple. This likely correlates with the mammographic finding. Ultrasound-guided biopsy is advised. Recommend post biopsy mammographic correlation to confirm that it correlates   with the mammographic finding. These results and recommendations were discussed with the patient by Dr. Azucena Carpenter. She will be assisted to schedule the recommended biopsy by the breast   nurse navigator. An order will be requested from her referring physician. BIRADS:   BIRADS - CATEGORY 4B       Intermediate suspicion for malignancy.        Suspicious Abnormality. Biopsy should be considered at this time. 12/10/21:Final Pathologic Diagnosis:   Breast, left, 8 o'clock, ultrasound guided needle core   biopsy:   -  INVASIVE DUCTAL CARCINOMA (see comment, see stains   report). BREAST INVASIVE CARCINOMA SUMMARY - CORE BIOPSY   Procedure:  Ultrasound guided needle core biopsy. Laterality and tumor site:  Left breast at 8 o'clock. Tumor size:  7 mm. in greatest dimension within biopsy   material present. Histologic type: Invasive ductal carcinoma. Histologic Grade (Jennifer): Grade 2,        -Tubular score 3, Nuclear score 3, Mitosis score 1 (7   /10 hpf). Ductal Carcinoma In Situ (DCIS):  Not present. Lobular Carcinoma In Situ (LCIS):  Not present. Lympho-vascular invasion:  Not identified. Microcalcifications:  Present. Additional Findings:  None. Ancillary studies: ER/PgR/Her2 results from the current   biopsy are as follows:   -ER by [facility]*: Negative (0% staining)   -PgR by IHC*: Negative (0% staining)   -Her2 by IHC*: Negative (Score 0)   -Her2 by FISH*:Negative   -Avg. #HER2 signals/nucleus: 1.8, Avg. #CEP17   signals/nucleus: 1.72, HER2/CEP17 ratio: 1.05      12/16/2021 CBC WBC of 4.8 hemoglobin of 12.6 hematocrit of 39.6 MCV of 100.5 and platelets of 802. Sodium of 138 potassium 4.6 BUN of 11 creatinine 0.4. LFTs with alk phos 155 ALT 54    12/23/2021 CT scan of the abdomen pelvis with IV contrast revealed couple of noncalcified micro nodules in the lower lobe of the right lung. 1/10/22: PORT placed    1/17/22: PET:  Approximate 1.1 cm mass of the left lower inner breast with minimal focal FDG   avidity which may correspond to the known primary. There is no evidence of   metastatic disease. C1D1 neoadjuvant AC started 1/20/22 Feb 2022 no clinical relevant variables detected on genetic testing    3/31/22: C1D1 taxol    6/9/22: last dose of taxol    6/10/22; mammogram and ultrasound.   1. Previously noted biopsy-proven neoplasm within the left breast at the 8   o'clock position is not visualized on today's exam.   2. No new mammographic evidence for malignancy in the left breast.      7/29/22: Underwent lumpectomy and Axillary LND:No remnant malignancy and neg LN. Past Medical History:     Hyperlipidemia, hypertension, depression                                                           Past Surgery History: Total abdominal hysterectomy, bilateral salpingo-oophorectomy                                                                          Social History:   Lives with a partner. Has 2 children 22 and 35, 2 daughters. Quit smoking about 25 years ago prior to which she smoked 1 pack/day for 7 years. Quit drinking alcohol on 12/2/2021 prior to which she consumed 4-5 beers per day and also whiskey sometimes. Currently on sick leave, was an  at [employer]. Family History:    Father diagnosed with prostate cancer                                                                                               No Known Allergies    Current Outpatient Medications on File Prior to Visit   Medication Sig Dispense Refill    Cholecalciferol (VITAMIN D3 PO) Take 1 tablet by mouth daily      FLUoxetine (PROZAC) 20 MG capsule Take 20 mg by mouth daily      ibuprofen (ADVIL;MOTRIN) 600 MG tablet Take 600 mg by mouth every 6 hours as needed for Pain      ATORVASTATIN CALCIUM PO Take by mouth      lisinopril (PRINIVIL;ZESTRIL) 20 MG tablet TAKE ONE TABLET BY MOUTH DAILY 90 tablet 1    acyclovir (ZOVIRAX) 400 MG tablet Take 1 tablet by mouth daily 90 tablet 1     No current facility-administered medications on file prior to visit. Interval history:8/22/22: She arrived with her  to the clinic today. Reported neuropathy is stable. No falls. No chest pain. No new breast mass.      Review of Systems:  As per HOMOCYSTEINE  No components found for: LD  Lab Results   Component Value Date    TSHHS 1.870 06/08/2022    T4FREE 1.28 06/08/2022    FT3 3.9 06/08/2022     Immunology:  Lab Results   Component Value Date    PROT 6.4 06/08/2022     No results found for: Kwame Grimaldo, KLFLCR  No results found for: B2M  Coagulation Panel:  No results found for: PROTIME, INR, APTT, DDIMER  Anemia Panel:  No results found for: [id number], FOLATE  Tumor Markers:  Lab Results   Component Value Date    LABCA2 29.6 01/19/2022        Observations:  ECOG:  No data recorded       Assessment & Plan:                                                          Triple negative breast cancer,biopsy on 12/1/2021,  left, 1.1 cm, grade 2, no evidence of any axillary  lymphadenopathy per available imaging studies. PET scan jan 2022 with no other met disease. Ca 27-29 was 29.6 in jan 2021 . Discussed the findings, diagnosis, possible staging and recommended neoadjuvant chemotherapy with AC followed by T followed by surgical resection followed by radiation pending surgical modality(Note genetic testing negative). Discussed the adverse effects of doxorubicin, cyclophosphamide and Taxol. Answered all questions. PORT completed and OCM completed. Echocardiogram jan 12 2022 with preserved EF.   C1D1 AC started jan 20 2022. Completed march 3 2022   Dose adjustments were  made according to adverse effects and cytopenias. Started Neoadjuvant Taxol march 31/2022, completed 6/9/22  VUS MSH6 [id number]  Underwent lumpectomy July 29 2022  No evidence of residual malignancy  Plan for XRT  Will follow per NCCN guidelines after completion of radiation    NC anemia: Most probably sec to chemotherapy. Continue to monitor     Recommend complete cessation of ETOH. Constipation: recommended stool softener and laxatives as needed. ?Seasonal allergies: Recommended trial of claritin    Neuropathy:  Note h/o ETOH, complete cessation. .  Defers pharmacological intervention at this point . Recommend epsom salt soaks and also discussed topical compounded. Continue other medical care. Thank you for letting us be part of the care and will follow along. Discussed above findings and plan with her and she voiced understanding. Answered all her questions. Discussed healthy lifestyle including healthy diet, regular exercise as tolerated. Also discussed importance of being up-to-date with age-appropriate screening tools. Quit smoking and taking THC  Recommend ETOH cessation    Recommend follow-up with primary care physician and other specialists. Please do not hesitate to contact us if you need further information.     Return to clinic October 2022 or earlier if new Sx    REX

## 2022-08-22 NOTE — PROGRESS NOTES
Thong Lemusa  8/22/2022    CONSULT     Vitals:    08/22/22 1415   BP: (!) 142/83   Pulse: 77   Temp: 97.1 °F (36.2 °C)   SpO2: 93%        Oxygen Therapy  SpO2: 93 %  Pulse Oximeter Device Mode: Continuous  Pulse Oximeter Device Location: Finger  O2 Device: None (Room air)  Skin Assessment: Clean, dry, & intact  Oxygen Therapy: None (Room air)    Wt Readings from Last 3 Encounters:   08/22/22 248 lb (112.5 kg)   08/10/22 250 lb (113.4 kg)   07/20/22 252 lb (114.3 kg)           Denies Need for Intervention    Fall Risk:    Not currently a fall risk  Re-Evaluate Weekly    Nutritional Alteration:  None  No Difficulties Swallowing  Voices Sufficient Oral Intake    Mediport: yes, RIGHT SIDE    Pacemaker/ICD: no    Implants: no    Previous XRT: no    Assessment: Patient here with daughter sophy       Past Medical History:   Diagnosis Date    Breast cancer ([address] 12/2021    left    Depression     Hx antineoplastic chemo     Hyperlipidemia     Hypertension     Hypothyroidism        Past Surgical History:   Procedure Laterality Date    BREAST LUMPECTOMY Left 2022    HYSTERECTOMY, VAGINAL  12/02/2021    PORT SURGERY      US BREAST NEEDLE BIOPSY LEFT Left 12/10/2021    US BREAST NEEDLE BIOPSY LEFT 12/10/2021 Sourav Armijo MD [facility]       No Known Allergies       Current Outpatient Medications:     Cholecalciferol (VITAMIN D3 PO), Take 1 tablet by mouth daily, Disp: , Rfl:     FLUoxetine (PROZAC) 20 MG capsule, Take 20 mg by mouth daily, Disp: , Rfl:     ibuprofen (ADVIL;MOTRIN) 600 MG tablet, Take 600 mg by mouth every 6 hours as needed for Pain, Disp: , Rfl:     ATORVASTATIN CALCIUM PO, Take by mouth, Disp: , Rfl:     lisinopril (PRINIVIL;ZESTRIL) 20 MG tablet, TAKE ONE TABLET BY MOUTH DAILY, Disp: 90 tablet, Rfl: 1    acyclovir (ZOVIRAX) 400 MG tablet, Take 1 tablet by mouth daily, Disp: 90 tablet, Rfl: 1        Electronically signed by Michelle Hines CMA on 8/22/2022 at 2:20 PM

## 2022-08-26 DIAGNOSIS — Z17.1 MALIGNANT NEOPLASM OF LOWER-INNER QUADRANT OF LEFT BREAST IN FEMALE, ESTROGEN RECEPTOR NEGATIVE (HCC): ICD-10-CM

## 2022-08-26 DIAGNOSIS — C50.312 MALIGNANT NEOPLASM OF LOWER-INNER QUADRANT OF LEFT BREAST IN FEMALE, ESTROGEN RECEPTOR NEGATIVE (HCC): ICD-10-CM

## 2022-08-26 RX ORDER — DEXAMETHASONE 2 MG/1
TABLET ORAL
Qty: 28 TABLET | Refills: 0 | Status: SHIPPED | OUTPATIENT
Start: 2022-08-26 | End: 2022-11-01

## 2022-08-31 ENCOUNTER — HOSPITAL ENCOUNTER (OUTPATIENT)
Dept: RADIATION ONCOLOGY | Age: 57
Discharge: HOME | End: 2022-08-31
Payer: COMMERCIAL

## 2022-08-31 PROCEDURE — 77332 RADIATION TREATMENT AID(S): CPT | Performed by: RADIOLOGY

## 2022-08-31 PROCEDURE — 77290 THER RAD SIMULAJ FIELD CPLX: CPT | Performed by: RADIOLOGY

## 2022-08-31 PROCEDURE — 77263 THER RADIOLOGY TX PLNG CPLX: CPT | Performed by: RADIOLOGY

## 2022-09-06 ENCOUNTER — APPOINTMENT (OUTPATIENT)
Dept: RADIATION ONCOLOGY | Age: 57
End: 2022-09-06
Payer: COMMERCIAL

## 2022-09-06 ENCOUNTER — HOSPITAL ENCOUNTER (OUTPATIENT)
Dept: RADIATION ONCOLOGY | Age: 57
Discharge: HOME | End: 2022-09-06
Payer: COMMERCIAL

## 2022-09-06 PROCEDURE — 77300 RADIATION THERAPY DOSE PLAN: CPT | Performed by: RADIOLOGY

## 2022-09-06 PROCEDURE — 77334 RADIATION TREATMENT AID(S): CPT | Performed by: RADIOLOGY

## 2022-09-06 PROCEDURE — 77295 3-D RADIOTHERAPY PLAN: CPT | Performed by: RADIOLOGY

## 2022-09-07 ENCOUNTER — HOSPITAL ENCOUNTER (OUTPATIENT)
Dept: RADIATION ONCOLOGY | Age: 57
Discharge: HOME | End: 2022-09-07
Payer: COMMERCIAL

## 2022-09-07 ENCOUNTER — APPOINTMENT (OUTPATIENT)
Dept: RADIATION ONCOLOGY | Age: 57
End: 2022-09-07
Payer: COMMERCIAL

## 2022-09-07 PROCEDURE — 77280 THER RAD SIMULAJ FIELD SMPL: CPT | Performed by: RADIOLOGY

## 2022-09-08 ENCOUNTER — HOSPITAL ENCOUNTER (OUTPATIENT)
Dept: RADIATION ONCOLOGY | Age: 57
Discharge: HOME | End: 2022-09-08
Payer: COMMERCIAL

## 2022-09-08 PROCEDURE — 77412 RADIATION TX DELIVERY LVL 3: CPT | Performed by: RADIOLOGY

## 2022-09-08 PROCEDURE — 77300 RADIATION THERAPY DOSE PLAN: CPT | Performed by: RADIOLOGY

## 2022-09-09 ENCOUNTER — HOSPITAL ENCOUNTER (OUTPATIENT)
Dept: RADIATION ONCOLOGY | Age: 57
Discharge: HOME | End: 2022-09-09
Payer: COMMERCIAL

## 2022-09-09 PROCEDURE — 77412 RADIATION TX DELIVERY LVL 3: CPT | Performed by: RADIOLOGY

## 2022-09-12 ENCOUNTER — HOSPITAL ENCOUNTER (OUTPATIENT)
Dept: RADIATION ONCOLOGY | Age: 57
Discharge: HOME | End: 2022-09-12
Payer: COMMERCIAL

## 2022-09-12 VITALS — BODY MASS INDEX: 47.05 KG/M2 | WEIGHT: 249 LBS

## 2022-09-12 PROCEDURE — 77412 RADIATION TX DELIVERY LVL 3: CPT | Performed by: RADIOLOGY

## 2022-09-12 ASSESSMENT — PAIN SCALES - GENERAL: PAINLEVEL_OUTOF10: 0

## 2022-09-12 NOTE — PROGRESS NOTES
Weekly Radiation Treatment Progress Note    DATE OF SERVICE: 9/12/2022     DIAGNOSIS:  Cancer Staging  Malignant neoplasm of lower-inner quadrant of breast in female, estrogen receptor negative ([facility] [address]  Staging form: Breast, AJCC 8th Edition  - Clinical: Stage IB (cT1c, cN0, cM0, G2, ER-, [place]-, HER2-) - Signed by Laura Eason MD on 8/22/2022       TREATMENT COURSE:   Oncology History   Malignant neoplasm of lower-inner quadrant of breast in female, estrogen receptor negative ([facility] [address]   1/20/2022 - 6/9/2022 Chemotherapy    ACx4; Taxol     1/2022 -  Cancer Staged    Staging form: Breast, AJCC 8th Edition  - Clinical: Stage IB (cT1c, cN0, cM0, G2, ER-, [place]-, HER2-)       7/29/2022 Surgery    Lumpectomy with sentinel lymph node sampling:    No residual malignancy. All 3 sentinel nodes were negative. Site: L Breast  Current Total Radiation Dose: 795 cGy    Pt doing well. Energy good.   No RT complaints    EXAM  Wt Readings from Last 3 Encounters:   09/12/22 249 lb (112.9 kg)   08/22/22 248 lb (112.5 kg)   08/22/22 248 lb (112.5 kg)     NAD    Setup images, chart, plan reviewed    A/P:   Tolerating RT well  Continue RT as planned  Her questions were answered      Electronically signed by Laura Eason MD on 9/12/2022 at 9:02 AM

## 2022-09-13 ENCOUNTER — HOSPITAL ENCOUNTER (OUTPATIENT)
Dept: RADIATION ONCOLOGY | Age: 57
Discharge: HOME | End: 2022-09-13
Payer: COMMERCIAL

## 2022-09-13 PROCEDURE — 77412 RADIATION TX DELIVERY LVL 3: CPT | Performed by: RADIOLOGY

## 2022-09-13 PROCEDURE — 77427 RADIATION TX MANAGEMENT X5: CPT | Performed by: RADIOLOGY

## 2022-09-14 ENCOUNTER — HOSPITAL ENCOUNTER (OUTPATIENT)
Dept: RADIATION ONCOLOGY | Age: 57
Discharge: HOME | End: 2022-09-14
Payer: COMMERCIAL

## 2022-09-14 PROCEDURE — 77336 RADIATION PHYSICS CONSULT: CPT | Performed by: RADIOLOGY

## 2022-09-14 PROCEDURE — 77417 THER RADIOLOGY PORT IMAGE(S): CPT | Performed by: RADIOLOGY

## 2022-09-14 PROCEDURE — 77412 RADIATION TX DELIVERY LVL 3: CPT | Performed by: RADIOLOGY

## 2022-09-15 ENCOUNTER — HOSPITAL ENCOUNTER (OUTPATIENT)
Dept: RADIATION ONCOLOGY | Age: 57
Discharge: HOME | End: 2022-09-15
Payer: COMMERCIAL

## 2022-09-15 PROCEDURE — 77412 RADIATION TX DELIVERY LVL 3: CPT | Performed by: RADIOLOGY

## 2022-09-16 ENCOUNTER — HOSPITAL ENCOUNTER (OUTPATIENT)
Dept: RADIATION ONCOLOGY | Age: 57
Discharge: HOME | End: 2022-09-16
Payer: COMMERCIAL

## 2022-09-16 PROCEDURE — 77412 RADIATION TX DELIVERY LVL 3: CPT | Performed by: RADIOLOGY

## 2022-09-19 ENCOUNTER — HOSPITAL ENCOUNTER (OUTPATIENT)
Dept: RADIATION ONCOLOGY | Age: 57
Discharge: HOME | End: 2022-09-19
Payer: COMMERCIAL

## 2022-09-19 VITALS — WEIGHT: 248 LBS | BODY MASS INDEX: 46.86 KG/M2

## 2022-09-19 PROCEDURE — 77412 RADIATION TX DELIVERY LVL 3: CPT | Performed by: RADIOLOGY

## 2022-09-19 ASSESSMENT — PAIN SCALES - GENERAL: PAINLEVEL_OUTOF10: 0

## 2022-09-19 NOTE — PLAN OF CARE
Care plan reviewed. _Left breast with mild pink skin, using Miaderm.     _energy down a bit, reports tolerating ADL's without difficulty

## 2022-09-19 NOTE — PROGRESS NOTES
Weekly Radiation Treatment Progress Note    DATE OF SERVICE: 9/19/2022     DIAGNOSIS:  Cancer Staging  Malignant neoplasm of lower-inner quadrant of breast in female, estrogen receptor negative ([facility] [address]  Staging form: Breast, AJCC 8th Edition  - Clinical: Stage IB (cT1c, cN0, cM0, G2, ER-, [place]-, HER2-) - Signed by Sol Lo MD on 8/22/2022       TREATMENT COURSE:   Oncology History   Malignant neoplasm of lower-inner quadrant of breast in female, estrogen receptor negative ([facility] [address]   1/20/2022 - 6/9/2022 Chemotherapy    ACx4; Taxol     1/2022 -  Cancer Staged    Staging form: Breast, AJCC 8th Edition  - Clinical: Stage IB (cT1c, cN0, cM0, G2, ER-, HI-, HER2-)       7/29/2022 Surgery    Lumpectomy with sentinel lymph node sampling:    No residual malignancy. All 3 sentinel nodes were negative. Site: L Breast  Current Total Radiation Dose: 2120 cGy    Pt doing well. Energy good. +/-Minimal skin itching/soreness. Using Miaderm    EXAM  Wt Readings from Last 3 Encounters:   09/12/22 249 lb (112.9 kg)   08/22/22 248 lb (112.5 kg)   08/22/22 248 lb (112.5 kg)     NAD  Mild Skin erythema.  No desquamation    Setup images, chart, plan reviewed    A/P:   Tolerating RT well  Continue RT as planned      Electronically signed by Sol Lo MD on 9/19/2022 at 8:48 AM

## 2022-09-20 ENCOUNTER — HOSPITAL ENCOUNTER (OUTPATIENT)
Dept: RADIATION ONCOLOGY | Age: 57
Discharge: HOME | End: 2022-09-20
Payer: COMMERCIAL

## 2022-09-20 PROCEDURE — 77427 RADIATION TX MANAGEMENT X5: CPT | Performed by: RADIOLOGY

## 2022-09-20 PROCEDURE — 77412 RADIATION TX DELIVERY LVL 3: CPT | Performed by: RADIOLOGY

## 2022-09-21 ENCOUNTER — HOSPITAL ENCOUNTER (OUTPATIENT)
Dept: RADIATION ONCOLOGY | Age: 57
Discharge: HOME | End: 2022-09-21
Payer: COMMERCIAL

## 2022-09-21 PROCEDURE — 77412 RADIATION TX DELIVERY LVL 3: CPT | Performed by: RADIOLOGY

## 2022-09-21 PROCEDURE — 77336 RADIATION PHYSICS CONSULT: CPT | Performed by: RADIOLOGY

## 2022-09-21 PROCEDURE — 77417 THER RADIOLOGY PORT IMAGE(S): CPT | Performed by: RADIOLOGY

## 2022-09-22 ENCOUNTER — HOSPITAL ENCOUNTER (OUTPATIENT)
Dept: RADIATION ONCOLOGY | Age: 57
Discharge: HOME | End: 2022-09-22
Payer: COMMERCIAL

## 2022-09-22 PROCEDURE — 77412 RADIATION TX DELIVERY LVL 3: CPT | Performed by: RADIOLOGY

## 2022-09-23 ENCOUNTER — HOSPITAL ENCOUNTER (OUTPATIENT)
Dept: RADIATION ONCOLOGY | Age: 57
Discharge: HOME | End: 2022-09-23
Payer: COMMERCIAL

## 2022-09-23 PROCEDURE — 77412 RADIATION TX DELIVERY LVL 3: CPT | Performed by: RADIOLOGY

## 2022-09-26 ENCOUNTER — HOSPITAL ENCOUNTER (OUTPATIENT)
Dept: RADIATION ONCOLOGY | Age: 57
Discharge: HOME | End: 2022-09-26
Payer: COMMERCIAL

## 2022-09-26 VITALS — BODY MASS INDEX: 47.01 KG/M2 | WEIGHT: 248.8 LBS

## 2022-09-26 PROCEDURE — 77412 RADIATION TX DELIVERY LVL 3: CPT | Performed by: RADIOLOGY

## 2022-09-26 ASSESSMENT — PAIN SCALES - GENERAL: PAINLEVEL_OUTOF10: 0

## 2022-09-26 NOTE — PLAN OF CARE
Care plan reviewed. _Left breast with mild tanning, using Miaderm.      _energy down a bit, reports tolerating ADL's without difficulty

## 2022-09-27 ENCOUNTER — HOSPITAL ENCOUNTER (OUTPATIENT)
Dept: RADIATION ONCOLOGY | Age: 57
Discharge: HOME | End: 2022-09-27
Payer: COMMERCIAL

## 2022-09-27 PROCEDURE — 77427 RADIATION TX MANAGEMENT X5: CPT | Performed by: RADIOLOGY

## 2022-09-27 PROCEDURE — 77412 RADIATION TX DELIVERY LVL 3: CPT | Performed by: RADIOLOGY

## 2022-09-28 ENCOUNTER — HOSPITAL ENCOUNTER (OUTPATIENT)
Dept: RADIATION ONCOLOGY | Age: 57
Discharge: HOME | End: 2022-09-28
Payer: COMMERCIAL

## 2022-09-28 PROCEDURE — 77412 RADIATION TX DELIVERY LVL 3: CPT | Performed by: RADIOLOGY

## 2022-09-28 PROCEDURE — 77290 THER RAD SIMULAJ FIELD CPLX: CPT | Performed by: RADIOLOGY

## 2022-09-28 PROCEDURE — 77334 RADIATION TREATMENT AID(S): CPT | Performed by: RADIOLOGY

## 2022-09-28 PROCEDURE — 77307 TELETHX ISODOSE PLAN CPLX: CPT | Performed by: RADIOLOGY

## 2022-09-28 PROCEDURE — 77300 RADIATION THERAPY DOSE PLAN: CPT | Performed by: RADIOLOGY

## 2022-09-28 PROCEDURE — 77336 RADIATION PHYSICS CONSULT: CPT | Performed by: RADIOLOGY

## 2022-09-29 ENCOUNTER — HOSPITAL ENCOUNTER (OUTPATIENT)
Dept: RADIATION ONCOLOGY | Age: 57
Discharge: HOME | End: 2022-09-29
Payer: COMMERCIAL

## 2022-09-29 PROCEDURE — 77280 THER RAD SIMULAJ FIELD SMPL: CPT | Performed by: RADIOLOGY

## 2022-09-29 PROCEDURE — 77412 RADIATION TX DELIVERY LVL 3: CPT | Performed by: RADIOLOGY

## 2022-09-29 NOTE — PROGRESS NOTES
Pt to clinic room, c/o left axilla hyperpigmentation, soreness and itching. Skin intact, advised to notify RT staff if skin opens or becomes wet. Left breast and IMF with mild redness. Pt using Miaderm, advised to use Aquaphor and OTC Hydrocortisone Cream to above area as ordered per Dr. Michelle Felix. Pt voices understanding, advised to call with questions or concerns.

## 2022-09-30 ENCOUNTER — HOSPITAL ENCOUNTER (OUTPATIENT)
Dept: RADIATION ONCOLOGY | Age: 57
Discharge: HOME | End: 2022-09-30
Payer: COMMERCIAL

## 2022-09-30 PROCEDURE — 77412 RADIATION TX DELIVERY LVL 3: CPT | Performed by: RADIOLOGY

## 2022-10-03 ENCOUNTER — HOSPITAL ENCOUNTER (OUTPATIENT)
Dept: RADIATION ONCOLOGY | Age: 57
Discharge: HOME | End: 2022-10-03
Payer: COMMERCIAL

## 2022-10-03 VITALS — BODY MASS INDEX: 46.48 KG/M2 | WEIGHT: 246 LBS

## 2022-10-03 PROCEDURE — 77412 RADIATION TX DELIVERY LVL 3: CPT | Performed by: RADIOLOGY

## 2022-10-03 ASSESSMENT — PAIN SCALES - GENERAL: PAINLEVEL_OUTOF10: 0

## 2022-10-03 NOTE — PROGRESS NOTES
Weekly Radiation Treatment Progress Note    DATE OF SERVICE: 10/3/2022     DIAGNOSIS:  Cancer Staging  Malignant neoplasm of lower-inner quadrant of breast in female, estrogen receptor negative ([facility] [address]  Staging form: Breast, AJCC 8th Edition  - Clinical: Stage IB (cT1c, cN0, cM0, G2, ER-, [place]-, HER2-) - Signed by Jose Valdes MD on 8/22/2022       TREATMENT COURSE:   Oncology History   Malignant neoplasm of lower-inner quadrant of breast in female, estrogen receptor negative ([facility] [address]   1/20/2022 - 6/9/2022 Chemotherapy    ACx4; Taxol     1/2022 -  Cancer Staged    Staging form: Breast, AJCC 8th Edition  - Clinical: Stage IB (cT1c, cN0, cM0, G2, ER-, [place]-, HER2-)       7/29/2022 Surgery    Lumpectomy with sentinel lymph node sampling:    No residual malignancy. All 3 sentinel nodes were negative. 9/8/2022 -  Radiation    Left Breast: 4240 cGy  Boost: 1000 cGy; TD= 5240 cGy           Site: L Breast  Current Total Radiation Dose: 4240 cGy    Pt doing well. Energy good. Mild skin itching/soreness. Using Miaderm    EXAM  Wt Readings from Last 3 Encounters:   10/03/22 246 lb (111.6 kg)   09/26/22 248 lb 12.8 oz (112.9 kg)   09/19/22 248 lb (112.5 kg)     NAD  Mild Skin erythema. Impending few 1 mm areas of moist desquamation IMF    Setup images, chart, plan reviewed    A/P:   Tolerating RT well  Continue RT as planned  Neosporin as needed.   RV 1mo      Electronically signed by Jose Valdes MD on 10/3/2022 at 8:56 AM

## 2022-10-03 NOTE — PLAN OF CARE
Care plan reviewed.      _Left breast red, increased in axilla and IMF, small pea sized dry desquamation IMF, pt using Miaderm and Aquaphor     _energy down, reports tolerating ADL's without difficulty

## 2022-10-04 ENCOUNTER — HOSPITAL ENCOUNTER (OUTPATIENT)
Age: 57
Setting detail: SPECIMEN
Discharge: HOME | End: 2022-10-04
Payer: COMMERCIAL

## 2022-10-04 ENCOUNTER — OFFICE VISIT (OUTPATIENT)
Dept: FAMILY MEDICINE CLINIC | Age: 57
End: 2022-10-04
Payer: COMMERCIAL

## 2022-10-04 ENCOUNTER — HOSPITAL ENCOUNTER (OUTPATIENT)
Dept: RADIATION ONCOLOGY | Age: 57
Discharge: HOME | End: 2022-10-04
Payer: COMMERCIAL

## 2022-10-04 VITALS
HEIGHT: 61 IN | RESPIRATION RATE: 12 BRPM | BODY MASS INDEX: 47.01 KG/M2 | WEIGHT: 249 LBS | OXYGEN SATURATION: 98 % | HEART RATE: 98 BPM | TEMPERATURE: 97 F

## 2022-10-04 DIAGNOSIS — Z20.822 SUSPECTED COVID-19 VIRUS INFECTION: Primary | ICD-10-CM

## 2022-10-04 PROCEDURE — 77427 RADIATION TX MANAGEMENT X5: CPT | Performed by: RADIOLOGY

## 2022-10-04 PROCEDURE — 99213 OFFICE O/P EST LOW 20 MIN: CPT | Performed by: PHYSICIAN ASSISTANT

## 2022-10-04 PROCEDURE — U0003 INFECTIOUS AGENT DETECTION BY NUCLEIC ACID (DNA OR RNA); SEVERE ACUTE RESPIRATORY SYNDROME CORONAVIRUS 2 (SARS-COV-2) (CORONAVIRUS DISEASE [COVID-19]), AMPLIFIED PROBE TECHNIQUE, MAKING USE OF HIGH THROUGHPUT TECHNOLOGIES AS DESCRIBED BY CMS-2020-01-R: HCPCS

## 2022-10-04 PROCEDURE — U0005 INFEC AGEN DETEC AMPLI PROBE: HCPCS

## 2022-10-04 PROCEDURE — 77412 RADIATION TX DELIVERY LVL 3: CPT | Performed by: RADIOLOGY

## 2022-10-04 NOTE — PROGRESS NOTES
10/4/2022    Genny Jackson    Chief Complaint   Patient presents with    Concern For COVID-19       HPI  History was obtained from patient  Jany Mallory is a 49 y.o. female who presents today with for PCR COVID-19 testing. She took a rapid test at home which was positive and her employer, [employer], wants PCR verification. She has had a 24-hour history of chills, cough, nasal congestion, headache, sore throat, myalgias, nausea, vomiting and fatigue. She reports good p.o. intake. She has taken Coricidin as needed. She is vaccinated against COVID-19, boosted x1 with Moderna. PAST MEDICAL HISTORY  Past Medical History:   Diagnosis Date    Breast cancer ([address] 12/2021    left    Depression     Hx antineoplastic chemo     Hyperlipidemia     Hypertension     Hypothyroidism        FAMILY HISTORY  Family History   Problem Relation Age of Onset    Breast Cancer Paternal Aunt     Ovarian Cancer Neg Hx        SOCIAL HISTORY  Social History     Socioeconomic History    Marital status:     Number of children: 2   Tobacco Use    Smoking status: Never    Smokeless tobacco: Former    Tobacco comments:     at age 14   Substance and Sexual Activity    Alcohol use:  Yes     Alcohol/week: 5.0 standard drinks     Types: 5 Cans of beer per week    Drug use: No    Sexual activity: Yes     Partners: Male        SURGICAL HISTORY  Past Surgical History:   Procedure Laterality Date    BREAST LUMPECTOMY Left 2022    HYSTERECTOMY, VAGINAL  12/02/2021    PORT SURGERY      US BREAST NEEDLE BIOPSY LEFT Left 12/10/2021    US BREAST NEEDLE BIOPSY LEFT 12/10/2021 Darya Gonzales MD [facility]       CURRENT MEDICATIONS  Current Outpatient Medications   Medication Sig Dispense Refill    dexamethasone (DECADRON) 2 MG tablet TAKE ONE TABLET BY MOUTH DAILY WITH BREAKFAST FOR 2 DAYS AFTER CHEMOTHERAPY *TAKE 8 MG (4 TABS) THE NIGHT BEFORE 1ST CHEMO ONLY* 28 tablet 0    Cholecalciferol (VITAMIN D3 PO) Take 1 tablet by mouth daily      FLUoxetine (PROZAC) 20 MG capsule Take 20 mg by mouth daily      ibuprofen (ADVIL;MOTRIN) 600 MG tablet Take 600 mg by mouth every 6 hours as needed for Pain      ATORVASTATIN CALCIUM PO Take by mouth      lisinopril (PRINIVIL;ZESTRIL) 20 MG tablet TAKE ONE TABLET BY MOUTH DAILY 90 tablet 1    acyclovir (ZOVIRAX) 400 MG tablet Take 1 tablet by mouth daily 90 tablet 1     No current facility-administered medications for this visit. ALLERGIES  No Known Allergies    PHYSICAL EXAM    Pulse 98   Temp 97 °F (36.1 °C)   Resp 12   Ht 5' 1\" (1.549 m)   Wt 249 lb (112.9 kg)   SpO2 98%   BMI 47.05 kg/m²     Constitutional:  Well developed, well nourished. No acute distress  HENT:  Normocephalic, atraumatic, bilateral external ears normal, bilateral ear canals and TMs normal, oropharynx moist, postnasal drip noted. No petechiae or exudate. Airway patent. Nasal mucosa congested  Eyes:  conjunctiva normal, no discharge, no scleral icterus  Neck:  No tenderness, supple  Lymphatic:  No lymphadenopathy noted  Cardiovascular:  Normal heart rate, normal rhythm, no murmurs, gallops or rubs  Thorax & Lungs:  Normal breath sounds, no respiratory distress, no wheezing, no rales, no rhonchi  Skin:  Warm, dry, no erythema, no rash  Neurologic:  Alert & oriented   Psychiatric:  Affect normal, mood normal    ASSESSMENT & PLAN    Tressa was seen today for concern for covid-19. Diagnoses and all orders for this visit:    Suspected COVID-19 virus infection  -     COVID-19    Patient took a rapid test at home which was positive. Her employer needs PCR verification  Increase fluids and rest  Saline nasal spray as needed for nasal congestion  Warm salt gargles as needed for throat discomfort  Monitor temperature twice a day  Tylenol as needed for fevers and/or discomfort.    Big deep breaths periodically throughout the day  Okay to continue Coricidin HBP as needed  If COVID test is positive, discuss Paxlovid with PCP  If symptoms worsen -Go to the ER. Follow up with your primary care provider    There are no discontinued medications. No follow-ups on file. Patient verbalizes understanding with the above plan and is in agreement. Patient will call with  questions or concerns. I did don appropriate PPE (including N95 face mask, protective safety glasses, face shield, gloves, and gown) as recommended by the health facility/national standard best practice, during my interaction with the patient. Please note that this chart was generated using dragon dictation software. Although every effort was made to ensure the accuracy of this automated transcription, some errors in transcription may have occurred.     Electronically signed by Mack Millard PA-C on 10/4/2022

## 2022-10-04 NOTE — PATIENT INSTRUCTIONS
Your COVID 19 test can take 1-5 days for the results to come back. We ask that you make a Mychart page and view your test results this way. You will need to Self quarantine until you know your results. If positive, please work on contact tracing. Increase fluids and rest  Saline nasal spray as needed for nasal congestion  Warm salt gargles as needed for throat discomfort  Monitor temperature twice a day  Tylenol as needed for fevers and/or discomfort. Big deep breaths periodically throughout the day  Okay to continue Coricidin HBP as needed  If COVID test is positive, discuss Paxlovid with PCP  If symptoms worsen -Go to the ER. Follow up with your primary care provider      To Whom it May Concern:    Hailey Adam was tested for COVID-19 10/4/2022. He/she must stay home until test results are back. If test is negative, ok to return to work/school. If test is positive, isolate for a total of 5 days, starting from day 1 of symptom onset. After 5 days, if fever-free for 24 hours and there has been a gradual improvement in symptoms, may come out of isolation, but must consistently wear a mask when around other people for 5 additional days. (5 days isolation, 5 days mask-wearing). We do not recommend retesting as patients may continue to test positive for months even though no longer contagious. It is suggested you call [address] [facility] or [facility] with any questions regarding isolation timeframe/return to work/school details.         Leonela Ewing PA-C

## 2022-10-04 NOTE — PROGRESS NOTES
10/4/22  Tressa Joseph Headings  1965    FLU/COVID-19 CLINIC EVALUATION    HPI SYMPTOMS:    Employer:[employer]    [] Fevers  [x] Chills  [x] Cough  [] Coughing up blood  [] Chest Congestion  [x] Nasal Congestion  [] Feeling short of breath  [] Sometimes  [] Frequently  [] All the time  [] Chest pain  [x] Headaches  [x]Tolerable  [] Severe  [x] Sore throat  [x] Muscle aches  [x] Nausea  [x] Vomiting  []Unable to keep fluids down  [] Diarrhea  []Severe    [x] OTHER SYMPTOMS:    Fatigue    Symptom Duration:   [x] 1  [] 2   [] 3   [] 4    [] 5   [] 6   [] 7   [] 8   [] 9   [] 10   [] 11   [] 12   [] 13   [] 14   [] Longer than 14 days    Symptom course:   [] Worsening     [x] Stable     [] Improving    RISK FACTORS:    [] Pregnant or possibly pregnant  [] Age over 61  [] Diabetes  [] Heart disease  [] Asthma  [] COPD/Other chronic lung diseases  [] Active Cancer  [x] On Chemotherapy  [] Taking oral steroids  [] History Lymphoma/Leukemia  [] Close contact with a lab confirmed COVID-19 patient within 14 days of symptom onset  [] History of travel from affected geographical areas within 14 days of symptom onset       VITALS:  There were no vitals filed for this visit. TESTS:    POCT FLU:  [] Positive     []Negative    ASSESSMENT:    [] Flu  [] Possible COVID-19  [] Strep    PLAN:    [] Discharge home with written instructions for:  [] Flu management  [] Possible COVID-19 infection with self-quarantine and management of symptoms  [] Follow-up with primary care physician or emergency department if worsens  [] Evaluation per physician/NP/PA in clinic  [] Sent to ER       An  electronic signature was used to authenticate this note.      --Navya Client on 10/4/2022 at 1:21 PM

## 2022-10-05 ENCOUNTER — HOSPITAL ENCOUNTER (OUTPATIENT)
Dept: RADIATION ONCOLOGY | Age: 57
Discharge: HOME | End: 2022-10-05
Payer: COMMERCIAL

## 2022-10-05 LAB
SARS-COV-2: DETECTED
SOURCE: ABNORMAL

## 2022-10-05 PROCEDURE — 77336 RADIATION PHYSICS CONSULT: CPT | Performed by: RADIOLOGY

## 2022-10-05 PROCEDURE — 77412 RADIATION TX DELIVERY LVL 3: CPT | Performed by: RADIOLOGY

## 2022-10-06 ENCOUNTER — HOSPITAL ENCOUNTER (OUTPATIENT)
Dept: RADIATION ONCOLOGY | Age: 57
Discharge: HOME | End: 2022-10-06
Payer: COMMERCIAL

## 2022-10-06 PROCEDURE — 77412 RADIATION TX DELIVERY LVL 3: CPT | Performed by: RADIOLOGY

## 2022-10-25 ENCOUNTER — HOSPITAL ENCOUNTER (OUTPATIENT)
Dept: INFUSION THERAPY | Age: 57
Discharge: HOME | End: 2022-10-25
Payer: COMMERCIAL

## 2022-10-25 DIAGNOSIS — G62.9 NEUROPATHY: ICD-10-CM

## 2022-10-25 DIAGNOSIS — R91.8 LUNG NODULES: ICD-10-CM

## 2022-10-25 DIAGNOSIS — C50.312 MALIGNANT NEOPLASM OF LOWER-INNER QUADRANT OF LEFT BREAST IN FEMALE, ESTROGEN RECEPTOR NEGATIVE (HCC): Primary | ICD-10-CM

## 2022-10-25 DIAGNOSIS — C50.919 MALIGNANT NEOPLASM OF BREAST IN FEMALE, ESTROGEN RECEPTOR NEGATIVE, UNSPECIFIED LATERALITY, UNSPECIFIED SITE OF BREAST (HCC): ICD-10-CM

## 2022-10-25 DIAGNOSIS — Z17.1 MALIGNANT NEOPLASM OF BREAST IN FEMALE, ESTROGEN RECEPTOR NEGATIVE, UNSPECIFIED LATERALITY, UNSPECIFIED SITE OF BREAST (HCC): ICD-10-CM

## 2022-10-25 DIAGNOSIS — Z17.1 MALIGNANT NEOPLASM OF LOWER-INNER QUADRANT OF LEFT BREAST IN FEMALE, ESTROGEN RECEPTOR NEGATIVE (HCC): Primary | ICD-10-CM

## 2022-10-25 LAB
ALBUMIN SERPL-MCNC: 4.4 GM/DL (ref 3.4–5)
ALP BLD-CCNC: 102 IU/L (ref 40–129)
ALT SERPL-CCNC: 19 U/L (ref 10–40)
ANION GAP SERPL CALCULATED.3IONS-SCNC: 14 MMOL/L (ref 4–16)
AST SERPL-CCNC: 18 IU/L (ref 15–37)
BASOPHILS ABSOLUTE: 0 K/CU MM
BASOPHILS RELATIVE PERCENT: 0.4 % (ref 0–1)
BILIRUB SERPL-MCNC: 1 MG/DL (ref 0–1)
BUN BLDV-MCNC: 18 MG/DL (ref 6–23)
CALCIUM SERPL-MCNC: 9.5 MG/DL (ref 8.3–10.6)
CHLORIDE BLD-SCNC: 99 MMOL/L (ref 99–110)
CO2: 28 MMOL/L (ref 21–32)
CREAT SERPL-MCNC: 0.5 MG/DL (ref 0.6–1.1)
DIFFERENTIAL TYPE: ABNORMAL
EOSINOPHILS ABSOLUTE: 0 K/CU MM
EOSINOPHILS RELATIVE PERCENT: 0.2 % (ref 0–3)
GFR SERPL CREATININE-BSD FRML MDRD: >60 ML/MIN/1.73M2
GLUCOSE BLD-MCNC: 118 MG/DL (ref 70–99)
HCT VFR BLD CALC: 37.8 % (ref 37–47)
HEMOGLOBIN: 12.4 GM/DL (ref 12.5–16)
LYMPHOCYTES ABSOLUTE: 1.1 K/CU MM
LYMPHOCYTES RELATIVE PERCENT: 20.2 % (ref 24–44)
MCH RBC QN AUTO: 31.6 PG (ref 27–31)
MCHC RBC AUTO-ENTMCNC: 32.8 % (ref 32–36)
MCV RBC AUTO: 96.4 FL (ref 78–100)
MONOCYTES ABSOLUTE: 0.3 K/CU MM
MONOCYTES RELATIVE PERCENT: 5.4 % (ref 0–4)
PDW BLD-RTO: 13.9 % (ref 11.7–14.9)
PLATELET # BLD: 202 K/CU MM (ref 140–440)
PMV BLD AUTO: 10 FL (ref 7.5–11.1)
POTASSIUM SERPL-SCNC: 4 MMOL/L (ref 3.5–5.1)
RBC # BLD: 3.92 M/CU MM (ref 4.2–5.4)
SEGMENTED NEUTROPHILS ABSOLUTE COUNT: 4.1 K/CU MM
SEGMENTED NEUTROPHILS RELATIVE PERCENT: 73.8 % (ref 36–66)
SODIUM BLD-SCNC: 141 MMOL/L (ref 135–145)
TOTAL PROTEIN: 6.6 GM/DL (ref 6.4–8.2)
WBC # BLD: 5.6 K/CU MM (ref 4–10.5)

## 2022-10-25 PROCEDURE — 36591 DRAW BLOOD OFF VENOUS DEVICE: CPT

## 2022-10-25 PROCEDURE — 86300 IMMUNOASSAY TUMOR CA 15-3: CPT

## 2022-10-25 PROCEDURE — 80053 COMPREHEN METABOLIC PANEL: CPT

## 2022-10-25 PROCEDURE — 2580000003 HC RX 258: Performed by: INTERNAL MEDICINE

## 2022-10-25 PROCEDURE — 6360000002 HC RX W HCPCS: Performed by: INTERNAL MEDICINE

## 2022-10-25 PROCEDURE — 85025 COMPLETE CBC W/AUTO DIFF WBC: CPT

## 2022-10-25 RX ORDER — SODIUM CHLORIDE 9 MG/ML
25 INJECTION, SOLUTION INTRAVENOUS PRN
Status: CANCELLED | OUTPATIENT
Start: 2022-10-25

## 2022-10-25 RX ORDER — HEPARIN SODIUM (PORCINE) LOCK FLUSH IV SOLN 100 UNIT/ML 100 UNIT/ML
500 SOLUTION INTRAVENOUS PRN
OUTPATIENT
Start: 2022-10-25

## 2022-10-25 RX ORDER — HEPARIN SODIUM (PORCINE) LOCK FLUSH IV SOLN 100 UNIT/ML 100 UNIT/ML
500 SOLUTION INTRAVENOUS PRN
Status: DISCONTINUED | OUTPATIENT
Start: 2022-10-25 | End: 2022-10-26 | Stop reason: HOSPADM

## 2022-10-25 RX ORDER — SODIUM CHLORIDE 0.9 % (FLUSH) 0.9 %
5-40 SYRINGE (ML) INJECTION PRN
Status: DISCONTINUED | OUTPATIENT
Start: 2022-10-25 | End: 2022-10-26 | Stop reason: HOSPADM

## 2022-10-25 RX ORDER — SODIUM CHLORIDE 0.9 % (FLUSH) 0.9 %
5-40 SYRINGE (ML) INJECTION PRN
OUTPATIENT
Start: 2022-10-25

## 2022-10-25 RX ORDER — SODIUM CHLORIDE 9 MG/ML
25 INJECTION, SOLUTION INTRAVENOUS PRN
OUTPATIENT
Start: 2022-10-25

## 2022-10-25 RX ADMIN — SODIUM CHLORIDE, PRESERVATIVE FREE 20 ML: 5 INJECTION INTRAVENOUS at 14:47

## 2022-10-25 RX ADMIN — HEPARIN 500 UNITS: 100 SYRINGE at 14:47

## 2022-10-25 NOTE — PROGRESS NOTES
Pt reported today for port draw and flush. Has a Mediport on Right side chest and was accessed with a 20 G  1 in  Gripper Plus Non-Coring safety needle. Was accessed on 1st attempt, blood return was noted, labs drawn and sent to lab for processing. Port was flushed with 20 cc's (0.9% Sodium Chloride Injection USP) and locked with 500 units of heparin. Pt tolerated procedure well.

## 2022-10-28 LAB — CA 27.29: 21.6 U/ML

## 2022-11-01 ENCOUNTER — OFFICE VISIT (OUTPATIENT)
Dept: ONCOLOGY | Age: 57
End: 2022-11-01
Payer: COMMERCIAL

## 2022-11-01 ENCOUNTER — HOSPITAL ENCOUNTER (OUTPATIENT)
Dept: INFUSION THERAPY | Age: 57
Discharge: HOME | End: 2022-11-01
Payer: COMMERCIAL

## 2022-11-01 VITALS
HEART RATE: 70 BPM | TEMPERATURE: 97.7 F | OXYGEN SATURATION: 97 % | DIASTOLIC BLOOD PRESSURE: 79 MMHG | SYSTOLIC BLOOD PRESSURE: 136 MMHG | WEIGHT: 247 LBS | HEIGHT: 61 IN | BODY MASS INDEX: 46.63 KG/M2 | RESPIRATION RATE: 18 BRPM

## 2022-11-01 VITALS
DIASTOLIC BLOOD PRESSURE: 79 MMHG | HEART RATE: 70 BPM | HEIGHT: 61 IN | OXYGEN SATURATION: 97 % | BODY MASS INDEX: 46.63 KG/M2 | RESPIRATION RATE: 18 BRPM | TEMPERATURE: 97.7 F | WEIGHT: 247 LBS | SYSTOLIC BLOOD PRESSURE: 136 MMHG

## 2022-11-01 DIAGNOSIS — R91.8 LUNG NODULES: ICD-10-CM

## 2022-11-01 DIAGNOSIS — C50.312 MALIGNANT NEOPLASM OF LOWER-INNER QUADRANT OF LEFT BREAST IN FEMALE, ESTROGEN RECEPTOR NEGATIVE (HCC): Primary | ICD-10-CM

## 2022-11-01 DIAGNOSIS — Z17.1 MALIGNANT NEOPLASM OF LOWER-INNER QUADRANT OF LEFT BREAST IN FEMALE, ESTROGEN RECEPTOR NEGATIVE (HCC): Primary | ICD-10-CM

## 2022-11-01 DIAGNOSIS — G62.9 NEUROPATHY: ICD-10-CM

## 2022-11-01 DIAGNOSIS — R74.8 ELEVATED LIVER ENZYMES: ICD-10-CM

## 2022-11-01 PROCEDURE — 3078F DIAST BP <80 MM HG: CPT | Performed by: INTERNAL MEDICINE

## 2022-11-01 PROCEDURE — 3074F SYST BP LT 130 MM HG: CPT | Performed by: INTERNAL MEDICINE

## 2022-11-01 PROCEDURE — 99214 OFFICE O/P EST MOD 30 MIN: CPT | Performed by: INTERNAL MEDICINE

## 2022-11-01 PROCEDURE — 99401 PREV MED CNSL INDIV APPRX 15: CPT | Performed by: INTERNAL MEDICINE

## 2022-11-01 PROCEDURE — 99211 OFF/OP EST MAY X REQ PHY/QHP: CPT

## 2022-11-01 NOTE — PROGRESS NOTES
Patient Name:  Baron Swenson  Patient :  1965  Patient MRN:  [id number]     Primary Oncologist: Ambrsoe Mace MD  Referring Provider: Yin Bucio PA-C     Date of Service: 2022      Reason for Consult:  TNBC     Chief Complaint:    Chief Complaint   Patient presents with    Follow-up       Encounter Diagnoses   Name Primary? Malignant neoplasm of lower-inner quadrant of left breast in female, estrogen receptor negative ([facility] [address] Yes    Neuropathy     Lung nodules     Elevated liver enzymes         HPI:   2021: She had with her  to the clinic today. Reported that she was found to have a mass in the left breast on routine mammogram.  Denied any axial lymphadenopathy. Denied any weight loss. Denied any new bone pains. Denied any focal weakness. Reported blurred vision, needs new glasses. Headaches lately. But reported that she recently quit drinking alcohol. Denied any chest pain or increase shortness of breath. Denied any abdominal pain, nausea vomiting, diarrhea, constipation    21: Mammogram:  Recommend spot gene synthesis compression views and possible ultrasound for   focal asymmetry in the left upper inner breast, middle depth. BIRADS:   BIRADS - CATEGORY 0       Incomplete: Needs Additional Imaging Evaluation     11/10/21: Mammogram:  0.7 x 0.8 x 0.7 cm suspicious mass left breast 8 o'clock, about 3-4 cm from   the left nipple. This likely correlates with the mammographic finding. Ultrasound-guided biopsy is advised. Recommend post biopsy mammographic correlation to confirm that it correlates   with the mammographic finding. These results and recommendations were discussed with the patient by Dr. Lucero Zamora. She will be assisted to schedule the recommended biopsy by the breast   nurse navigator. An order will be requested from her referring physician. BIRADS:   BIRADS - CATEGORY 4B       Intermediate suspicion for malignancy.        Suspicious Abnormality. Biopsy should be considered at this time. 12/10/21:Final Pathologic Diagnosis:   Breast, left, 8 o'clock, ultrasound guided needle core   biopsy:   -  INVASIVE DUCTAL CARCINOMA (see comment, see stains   report). BREAST INVASIVE CARCINOMA SUMMARY - CORE BIOPSY   Procedure:  Ultrasound guided needle core biopsy. Laterality and tumor site:  Left breast at 8 o'clock. Tumor size:  7 mm. in greatest dimension within biopsy   material present. Histologic type: Invasive ductal carcinoma. Histologic Grade (Jennifer): Grade 2,        -Tubular score 3, Nuclear score 3, Mitosis score 1 (7   /10 hpf). Ductal Carcinoma In Situ (DCIS):  Not present. Lobular Carcinoma In Situ (LCIS):  Not present. Lympho-vascular invasion:  Not identified. Microcalcifications:  Present. Additional Findings:  None. Ancillary studies: ER/PgR/Her2 results from the current   biopsy are as follows:   -ER by [facility]*: Negative (0% staining)   -PgR by IHC*: Negative (0% staining)   -Her2 by IHC*: Negative (Score 0)   -Her2 by FISH*:Negative   -Avg. #HER2 signals/nucleus: 1.8, Avg. #CEP17   signals/nucleus: 1.72, HER2/CEP17 ratio: 1.05      12/16/2021 CBC WBC of 4.8 hemoglobin of 12.6 hematocrit of 39.6 MCV of 100.5 and platelets of 714. Sodium of 138 potassium 4.6 BUN of 11 creatinine 0.4. LFTs with alk phos 155 ALT 54    12/23/2021 CT scan of the abdomen pelvis with IV contrast revealed couple of noncalcified micro nodules in the lower lobe of the right lung. 1/10/22: PORT placed    1/17/22: PET:  Approximate 1.1 cm mass of the left lower inner breast with minimal focal FDG   avidity which may correspond to the known primary. There is no evidence of   metastatic disease. C1D1 neoadjuvant AC started 1/20/22 Feb 2022 no clinical relevant variables detected on genetic testing    3/31/22: C1D1 taxol    6/9/22: last dose of taxol    6/10/22; mammogram and ultrasound.   1. Previously noted biopsy-proven neoplasm within the left breast at the 8   o'clock position is not visualized on today's exam.   2. No new mammographic evidence for malignancy in the left breast.      7/29/22: Underwent lumpectomy and Axillary LND:No remnant malignancy and neg LN. Past Medical History:     Hyperlipidemia, hypertension, depression                                                           Past Surgery History: Total abdominal hysterectomy, bilateral salpingo-oophorectomy                                                                          Social History:   Lives with a partner. Has 2 children 22 and 35, 2 daughters. Quit smoking about 25 years ago prior to which she smoked 1 pack/day for 7 years. Quit drinking alcohol on 12/2/2021 prior to which she consumed 4-5 beers per day and also whiskey sometimes. Currently on sick leave, was an  at [employer]. Family History:    Father diagnosed with prostate cancer                                                                                               No Known Allergies    Current Outpatient Medications on File Prior to Visit   Medication Sig Dispense Refill    Cholecalciferol (VITAMIN D3 PO) Take 1 tablet by mouth daily      FLUoxetine (PROZAC) 20 MG capsule Take 20 mg by mouth daily      ibuprofen (ADVIL;MOTRIN) 600 MG tablet Take 600 mg by mouth every 6 hours as needed for Pain      ATORVASTATIN CALCIUM PO Take by mouth      lisinopril (PRINIVIL;ZESTRIL) 20 MG tablet TAKE ONE TABLET BY MOUTH DAILY 90 tablet 1    acyclovir (ZOVIRAX) 400 MG tablet Take 1 tablet by mouth daily 90 tablet 1     No current facility-administered medications on file prior to visit. Interval history:11/1/22: She arrived alone to the clinic today. Reported that her energy levels are better. Trying to eat healthy. Not lost any weight. No new breast mass, axillary mass. No chest pain, increased sob, palpitations or any dizziness. No abdominal pain. Left knee hurts. No bleeding. No ha, vision changes or any focal weakness. Neuropathy in the feet, no falls. Topicals did not help. Review of Systems:  As per internal history     Vital Signs: /79 (Site: Left Upper Arm, Position: Sitting, Cuff Size: Large Adult)   Pulse 70   Temp 97.7 °F (36.5 °C) (Temporal)   Resp 18   Ht 5' 1\" (1.549 m)   Wt 247 lb (112 kg)   SpO2 97%   BMI 46.67 kg/m²     CONSTITUTIONAL: awake, alert, obese  EYES: IGNACIO, No pallor or any icterus  ENT: ATNC  NECK: No JVD, port in the right side  HEMATOLOGIC/LYMPHATIC: no cervical, supraclavicular or axillary lymphadenopathy   LUNGS: CTAB  CARDIOVASCULAR: s1s2 rrr no murmurs  ABDOMEN: soft ntnd bs pos  NEUROLOGIC: GI  SKIN: No rash  EXTREMITIES: no LE edema bilaterally  Left breast lumpectomy scar healing,  underlying induration, slight ttp, no discharge. No left axillary lad.  No masses in the right breast or right axillary lymphadenopathy     Labs:  Hematology:  Lab Results   Component Value Date    WBC 5.6 10/25/2022    RBC 3.92 (L) 10/25/2022    HGB 12.4 (L) 10/25/2022    HCT 37.8 10/25/2022    MCV 96.4 10/25/2022    MCH 31.6 (H) 10/25/2022    MCHC 32.8 10/25/2022    RDW 13.9 10/25/2022     10/25/2022    MPV 10.0 10/25/2022    SEGSPCT 73.8 (H) 10/25/2022    EOSRELPCT 0.2 10/25/2022    BASOPCT 0.4 10/25/2022    LYMPHOPCT 20.2 (L) 10/25/2022    MONOPCT 5.4 (H) 10/25/2022    SEGSABS 4.1 10/25/2022    EOSABS 0.0 10/25/2022    BASOSABS 0.0 10/25/2022    LYMPHSABS 1.1 10/25/2022    MONOSABS 0.3 10/25/2022    DIFFTYPE AUTOMATED DIFFERENTIAL 10/25/2022     No results found for: ESR  Chemistry:  Lab Results   Component Value Date     10/25/2022    K 4.0 10/25/2022    CL 99 10/25/2022    CO2 28 10/25/2022    BUN 18 10/25/2022    CREATININE 0.5 (L) 10/25/2022    GLUCOSE 118 (H) 10/25/2022    CALCIUM 9.5 10/25/2022    PROT 6.6 10/25/2022    LABALBU 4.4 10/25/2022    BILITOT 1.0 10/25/2022    ALKPHOS 102 10/25/2022    AST 18 10/25/2022    ALT 19 10/25/2022    LABGLOM >60 10/25/2022    GFRAA >60 06/08/2022    AGRATIO 1.7 10/11/2016    GLOB 2.4 10/11/2016     No results found for: MMA, LDH, HOMOCYSTEINE  No components found for: LD  Lab Results   Component Value Date    TSHHS 1.870 06/08/2022    T4FREE 1.28 06/08/2022    FT3 3.9 06/08/2022     Immunology:  Lab Results   Component Value Date    PROT 6.6 10/25/2022     No results found for: Yessica Hives, KLFLCR  No results found for: B2M  Coagulation Panel:  No results found for: PROTIME, INR, APTT, DDIMER  Anemia Panel:  No results found for: PAJIRVDM14, FOLATE  Tumor Markers:  Lab Results   Component Value Date    LABCA2 21.6 10/25/2022        Observations:  ECOG:  No data recorded       Assessment & Plan:                                                          Triple negative breast cancer,biopsy on 12/1/2021,  left, 1.1 cm, grade 2, no evidence of any axillary  lymphadenopathy per available imaging studies. PET scan jan 2022 with no other met disease. Ca 27-29 was 29.6 in jan 2021 . Discussed the findings, diagnosis, possible staging and recommended neoadjuvant chemotherapy with AC followed by T followed by surgical resection followed by radiation pending surgical modality(Note genetic testing negative). Discussed the adverse effects of doxorubicin, cyclophosphamide and Taxol. Answered all questions. PORT completed and OCM completed. Echocardiogram jan 12 2022 with preserved EF.   C1D1 AC started jan 20 2022. Completed march 3 2022   Dose adjustments were  made according to adverse effects and cytopenias. Started Neoadjuvant Taxol march 31/2022, completed 6/9/22  VUS MSH6 [id number]  Underwent lumpectomy July 29 2022  No evidence of residual malignancy  XRT completed  Ca27-29 October normal.  Mammogram pending  Will follow per NCCN guidelines     NC anemia: Most probably sec to chemotherapy.  Hb improving    Recommend complete cessation of ETOH and smoking tob    Constipation: recommended stool softener and laxatives as needed. Neuropathy:  Note h/o ETOH, complete cessation. Defers pharmacological intervention at this point . Recommend epsom salt soaks and also discussed topical compounded. Could be sec to chemotherapy    Continue other medical care. Thank you for letting us be part of the care and will follow along. Discussed above findings and plan with her and she voiced understanding. Answered all her questions. Discussed healthy lifestyle including healthy diet, regular exercise as tolerated. Also discussed importance of being up-to-date with age-appropriate screening tools. Quit smoking and taking THC  Recommend ETOH cessation    Recommend follow-up with primary care physician and other specialists. Please do not hesitate to contact us if you need further information.     Return to clinic feb 2023  or earlier if new Sx    REX

## 2022-11-01 NOTE — PROGRESS NOTES
Palliative Care  Assessment    Medical Oncology History:     mammogram 0.8 cm lesion left breast.  Biopsy invasive ductal carcinoma high-grade triple negative. PET scan 2022 1.1 cm mass left lower inner breast.  2022 started on dose dense Adriamycin and Cytoxan to be followed by Taxol in a neoadjuvant fashion. Finished AC on March 3, 2022 and started on Taxol on 2022. She finished chemotherapy treatments on 2022. 2022 underwent lumpectomy and sentinel node lymph node sampling. No residual malignancy. 2022 radiation therapy to the left breast.  2022 she was being followed    Other Medical Problems:    Hypertension, hyperlipidemia, depression. Previous total abdominal hysterectomy and bilateral salpingo-oophorectomy. Personal History     A. Life Time:    Grew up in [facility]. Finished high school. Got  for a short time, two girls ages 22 and 35.  2 grandchildren ages 5 and 2. Quite close with her daughters who live around. Has a boyfriend for last 6 years. Last summer 2022 started living alone    Works at [employer] for last 35 years different places. Previous history of smoking though was stopped sometime ago. History of excessive ethanol intake has stopped about 6 weeks ago. Has used recreational marijuana     B. Family:     Parents are  had 3 brothers and 3 sisters though not close with. Physical Symptoms:   2022 she was doing fair. She had finished her treatments with mild degree of morbidity. She had significant local reaction from radiation but did recover well. She was still experiencing some symptoms of weakness and tiredness but was able to work on a full-time basis at [employer]. Her daughter 22years of age had moved in with her. She also was feeling fair having stopped her relation with her boyfriend.   She was not smoking though she had started alcoholic and. She was still occasionally using cannabis. She was trying to monitor her diet and was thinking of starting exercising and losing weight   Psychological and Cognitive Symptoms:   As described above extremely concerned about the disease process but denies any cognitive decline  Illness Understanding and Care Preferences:   Has fairly good understanding of the disease process and of course is extremely pleased with the wonderful care she has been receiving here. November 1, 2022 of course she was pleased with not having any residual disease at the time of surgery  Existential and Spiritual:   Not a regular Hindu person. She believes and just being a good human being and treating everyone with respect and kindness  Social and Economic Resources:   Has a fairly good support system around her primarily 2 of her daughters and coworkers and a good support for her  Care Coordination:   April 21, 2022 spent time with her and her daughter. We of course talked about the disease process and her personal history made some suggestions regarding her food intake and foods to take for her to change her blood overall taste. Made some recommendation regarding abstaining being active. She also has some questions regarding possible surgery encouraged her to speak with her oncology team as well as her surgeon. Otherwise she was quite pleased with her treatments and is quite actively participating in the same. For the most part her symptoms are not quite well managed  November 1, 2022 again spent time with her. We of course did discuss about medical history of disease having responded favorably. She still was having some symptoms of weakness and tiredness and residual effects from the treatment. She personally was in a better place having started living alone though her daughter has moved in with her.   She has not been smoking though she had started as well again and I did spend time with her in that regards about need for her to refrain from same. Also spent time with her about monitoring her diet as well as exercise. But still for the most part her symptoms were fairly well managed and under control. I will be seeing her again in 6 months.   Time spent 15 minutes        Rupesh De La O MD

## 2022-11-01 NOTE — PROGRESS NOTES
MA Rooming Questions  Patient: Luan Osuna  MRN: [id number]    Date: 11/1/2022        1. Do you have any new issues?   no         2. Do you need any refills on medications?    no    3. Have you had any imaging done since your last visit?   no    4. Have you been hospitalized or seen in the emergency room since your last visit here?   no    5. Did the patient have a depression screening completed today?  No    No data recorded     PHQ-9 Given to (if applicable):               PHQ-9 Score (if applicable):                     [] Positive     []  Negative              Does question #9 need addressed (if applicable)                     [] Yes    []  No               Ector Tyler [facility]

## 2022-11-02 NOTE — PROGRESS NOTES
Radiation Oncology  Treatment Completion Summary  Encounter Date: 2022 9:41 AM    Ms. Kylah Camargo is a 56 y.o. female  : 1965  MRN: [id number]  [facility] Number: [de-identified]      FOLLOW UP PHYSICIANS:   Primary Care: FAISAL Elizondo MD      DIAGNOSIS:  Cancer Staging  Malignant neoplasm of lower-inner quadrant of breast in female, estrogen receptor negative ([facility] [address]  Staging form: Breast, AJCC 8th Edition  - Clinical: Stage IB (cT1c, cN0, cM0, G2, ER-, MT-, HER2-) - Signed by Mario Rodas MD on 2022         TREATMENT COURSE:   Oncology History   Malignant neoplasm of lower-inner quadrant of breast in female, estrogen receptor negative ([facility] [address]   2022 - 2022 Chemotherapy    ACx4; Taxol     2022 -  Cancer Staged    Staging form: Breast, AJCC 8th Edition  - Clinical: Stage IB (cT1c, cN0, cM0, G2, ER-, MT-, HER2-)       2022 Surgery    Lumpectomy with sentinel lymph node sampling:    No residual malignancy. All 3 sentinel nodes were negative. 2022 - 10/6/2022 Radiation    Left Breast: 4240 cGy  Boost: 1000 cGy; TD= 5240 cGy         HISTORY:  Kylah Camargo is a 56 y.o. female with the above referenced diagnosis. Complete details on history of present illness please see my initial consultation note. She has recently completed a course of adjuvant L breast radiation therapy and what follows is a description of the treatments she received:    ANATOMIC SITE: L Breast  BEAM ORIENTATION: Unopposed tangents  ENERGY: 10 MV photons  DOSE PER FRACTION: 265cGy  TOTAL DOSE: 4240cGy    ANATOMIC SITE: Lumpectomy bed  BEAM ORIENTATION: Wedged pair  ENERGY: 10/18 MV photons  DOSE PER FRACTION: 200 cGy  TOTAL DOSE: 1000 cGy, cumulative total 5240 cGy    ELAPSED DAYS: 28    TREATMENT TOLERANCE:   Overall, the patient tolerated her radiation therapy quite well. Her energy level was fairly well-maintained throughout the course of her treatments.   She did develop mild skin erythema. She did use Miaderm throughout the course her treatments. She developed a few mm areas moist desquamation IMF for which she was instructed to use neosporin. FOLLOW-UP PLANS:   She is to return to see me in 1 month or sooner as needed.     Electronically signed by Cheri Bello MD on 11/2/2022 at 9:41 AM

## 2022-11-29 ENCOUNTER — HOSPITAL ENCOUNTER (OUTPATIENT)
Dept: RADIATION ONCOLOGY | Age: 57
Discharge: HOME | End: 2022-11-29

## 2022-11-29 VITALS
BODY MASS INDEX: 46.78 KG/M2 | DIASTOLIC BLOOD PRESSURE: 80 MMHG | WEIGHT: 247.8 LBS | HEART RATE: 86 BPM | SYSTOLIC BLOOD PRESSURE: 140 MMHG | OXYGEN SATURATION: 96 % | TEMPERATURE: 97.7 F | HEIGHT: 61 IN

## 2022-11-29 DIAGNOSIS — Z17.1 MALIGNANT NEOPLASM OF LOWER-INNER QUADRANT OF LEFT BREAST IN FEMALE, ESTROGEN RECEPTOR NEGATIVE (HCC): Primary | ICD-10-CM

## 2022-11-29 DIAGNOSIS — C50.312 MALIGNANT NEOPLASM OF LOWER-INNER QUADRANT OF LEFT BREAST IN FEMALE, ESTROGEN RECEPTOR NEGATIVE (HCC): Primary | ICD-10-CM

## 2022-11-29 ASSESSMENT — PAIN SCALES - GENERAL: PAINLEVEL_OUTOF10: 1

## 2022-11-29 ASSESSMENT — PAIN DESCRIPTION - ORIENTATION: ORIENTATION: LEFT

## 2022-11-29 NOTE — PROGRESS NOTES
Constance Kimblenick  11/29/2022    Patient is seen today for follow up. Vitals:    11/29/22 0915   BP: (!) 140/80   Pulse: 86   Temp: 97.7 °F (36.5 °C)   SpO2: 96%        Oxygen Therapy  SpO2: 96 %  Pulse Oximeter Device Mode: Intermittent  Pulse Oximeter Device Location: Left, Finger  O2 Device: None (Room air)  Skin Assessment: Clean, dry, & intact    Wt Readings from Last 3 Encounters:   11/29/22 247 lb 12.8 oz (112.4 kg)   11/01/22 247 lb (112 kg)   11/01/22 247 lb (112 kg)       Pain Assessment  Pain Assessment: 0-10  Pain Level: 1  Pain Location: Breast, Arm, Other (Comment) (left axilla)  Pain Orientation: Left  OTC Analgesics     No Known Allergies     Current Outpatient Medications   Medication Sig Dispense Refill    Cholecalciferol (VITAMIN D3 PO) Take 1 tablet by mouth daily      FLUoxetine (PROZAC) 20 MG capsule Take 20 mg by mouth daily      ibuprofen (ADVIL;MOTRIN) 600 MG tablet Take 600 mg by mouth every 6 hours as needed for Pain      ATORVASTATIN CALCIUM PO Take by mouth      lisinopril (PRINIVIL;ZESTRIL) 20 MG tablet TAKE ONE TABLET BY MOUTH DAILY 90 tablet 1    acyclovir (ZOVIRAX) 400 MG tablet Take 1 tablet by mouth daily 90 tablet 1     No current facility-administered medications for this encounter. Additional Comments: here for 1 month f/u appointment after radiation completion. No new concerns today.      Electronically signed by Campbell Cruz CMA on 11/29/2022 at 9:18 AM

## 2022-11-29 NOTE — PROGRESS NOTES
[address] [address], [address] [facility]  Phone: [phone]  Fax: [phone]    RADIATION ONCOLOGY FOLLOW UP REPORT    PATIENT NAME:  Madalyn Marmolejo              : 1965  MEDICAL RECORD NO: [id number]    [facility] NO: [id number]        PROVIDER: Anel Mccollum MD      DATE OF SERVICE: 2022     FOLLOW UP PHYSICIANS:  Primary Care: FAISAL Harris MD Karena Bushy, MD      DIAGNOSIS: Cancer Staging  Malignant neoplasm of lower-inner quadrant of breast in female, estrogen receptor negative ([facility] [address]  Staging form: Breast, AJCC 8th Edition  - Clinical: Stage IB (cT1c, cN0, cM0, G2, ER-, UT-, HER2-) - Signed by Anel Mccollum MD on 2022         TREATMENT COURSE:   Oncology History   Malignant neoplasm of lower-inner quadrant of breast in female, estrogen receptor negative ([facility] [address]   2022 - 2022 Chemotherapy    ACx4; Taxol     2022 -  Cancer Staged    Staging form: Breast, AJCC 8th Edition  - Clinical: Stage IB (cT1c, cN0, cM0, G2, ER-, UT-, HER2-)       2022 Surgery    Lumpectomy with sentinel lymph node sampling:    No residual malignancy. All 3 sentinel nodes were negative. 2022 - 10/6/2022 Radiation    Left Breast: 4240 cGy  Boost: 1000 cGy; TD= 5240 cGy         HPI:   Madalyn Marmolejo is a 56 y.o. female who has a history as above, who returns today for her first posttreatment visit. Currently, she reports she has been doing well. Her energy level is returning, but is not yet back to normal.  She denies any skin itching or soreness. She does report some tenderness along the left thorax which she attributes to cleaning her apartment recently with some left shoulder soreness as well.     PET CT SKULL BASE TO MID THIGH    Narrative  EXAMINATION:  WHOLE BODY PET/CT    2022    TECHNIQUE:  Following IV injection of 17.21 mCi of F-18 FDG, PET  tumor imaging was  acquired from the base of the skull to the mid thighs. Computed tomography  was used for purposes of attenuation correction and anatomic localization. Fusion imaging was utilized for interpretation. Uptake time 60 to 90 min. Glucose level 108 mg/dl. COMPARISON:  None    HISTORY:  ORDERING SYSTEM PROVIDED HISTORY: Triple negative malignant neoplasm of  breast [facility])  TECHNOLOGIST PROVIDED HISTORY:  Reason for exam:->[facility]    Biopsy confirmed left IDC at the 8 o'clock position in the left breast    FINDINGS:  HEAD/NECK: No abnormal FDG avidity. CHEST: 1.1 cm mass of the left lower inner breast on series 2, image 68 with  minimal focal FDG avidity slightly above background breast tissue, SUV max  0.82. ABDOMEN/PELVIS: No abnormal FDG avidity. BONES/SOFT TISSUE: No abnormal FDG avidity. INCIDENTAL CT FINDINGS: Non avid coarsely calcified 1.9 cm lesion of the  anterior mediastinum on series 2, image 53, likely a benign lymph node or  complicated cystic lesion. Changes of mesenteric lipodystrophy. Impression  Approximate 1.1 cm mass of the left lower inner breast with minimal focal FDG  avidity which may correspond to the known primary. There is no evidence of  metastatic disease.       LABORATORY STUDIES:   CBC:   Lab Results   Component Value Date/Time    WBC 5.6 10/25/2022 02:30 PM    RBC 3.92 10/25/2022 02:30 PM    HGB 12.4 10/25/2022 02:30 PM    HCT 37.8 10/25/2022 02:30 PM    MCV 96.4 10/25/2022 02:30 PM    MCH 31.6 10/25/2022 02:30 PM    MCHC 32.8 10/25/2022 02:30 PM    RDW 13.9 10/25/2022 02:30 PM     10/25/2022 02:30 PM    MPV 10.0 10/25/2022 02:30 PM     CMP:  Lab Results   Component Value Date/Time     10/25/2022 02:30 PM    K 4.0 10/25/2022 02:30 PM    CL 99 10/25/2022 02:30 PM    CO2 28 10/25/2022 02:30 PM    BUN 18 10/25/2022 02:30 PM    CREATININE 0.5 10/25/2022 02:30 PM    GFRAA >60 06/08/2022 10:54 AM    AGRATIO 1.7 10/11/2016 09:07 AM    LABGLOM >60 10/25/2022 02:30 PM    GLUCOSE 118 10/25/2022 02:30 PM    PROT 6.6 10/25/2022 02:30 PM    LABALBU 4.4 10/25/2022 02:30 PM    CALCIUM 9.5 10/25/2022 02:30 PM    BILITOT 1.0 10/25/2022 02:30 PM    ALKPHOS 102 10/25/2022 02:30 PM    AST 18 10/25/2022 02:30 PM    ALT 19 10/25/2022 02:30 PM     Onc labs: No results found for: PSA, CEA, LDH, AFP    MEDICATIONS:   Current Outpatient Medications   Medication Sig Dispense Refill    Cholecalciferol (VITAMIN D3 PO) Take 1 tablet by mouth daily      FLUoxetine (PROZAC) 20 MG capsule Take 20 mg by mouth daily      ibuprofen (ADVIL;MOTRIN) 600 MG tablet Take 600 mg by mouth every 6 hours as needed for Pain      ATORVASTATIN CALCIUM PO Take by mouth      lisinopril (PRINIVIL;ZESTRIL) 20 MG tablet TAKE ONE TABLET BY MOUTH DAILY 90 tablet 1    acyclovir (ZOVIRAX) 400 MG tablet Take 1 tablet by mouth daily 90 tablet 1     No current facility-administered medications for this encounter. EXAMINATION:   Vitals:    11/29/22 0915   BP: (!) 140/80   Pulse: 86   Temp: 97.7 °F (36.5 °C)   SpO2: 96%     The patient is in no acute distress. Neck: Supple   Breast examination: Left-mild residual hyperpigmentation with mild edema. No palpable masses. Left thorax is without masses  Extremities: No cyanosis, clubbing, or edema is present. ASSESSMENT AND PLAN:     Madalyn Marmolejo is a 56 y.o. female who has a history of a stage Ib triple receptor negative invasive left-sided breast cancer who is now approximately 1 year after completion of her adjuvant left breast RT who is now also completed Herceptin and is continuing on anastrozole. She is doing well at this time without evidence of recurrent or metastatic disease. She is to continue on anastrozole under medical oncology's direction, to undergo left breast mammography in January of bilateral mammography 6 months later if unremarkable. She is to return to see me in 1 year or sooner as needed.     The patient is to continue to follow CDC's Covid 19 precautionary measures.       Electronically signed by Isaias Elizabeth MD on 11/29/2022 at 9:22 AM

## 2023-01-25 ENCOUNTER — TRANSCRIBE ORDERS (OUTPATIENT)
Dept: ADMINISTRATIVE | Age: 58
End: 2023-01-25

## 2023-01-25 DIAGNOSIS — C50.912 MALIGNANT NEOPLASM OF LEFT FEMALE BREAST, UNSPECIFIED ESTROGEN RECEPTOR STATUS, UNSPECIFIED SITE OF BREAST (HCC): Primary | ICD-10-CM

## 2023-03-17 ENCOUNTER — HOSPITAL ENCOUNTER (OUTPATIENT)
Dept: ULTRASOUND IMAGING | Age: 58
Discharge: HOME | End: 2023-03-17
Payer: COMMERCIAL

## 2023-03-17 ENCOUNTER — HOSPITAL ENCOUNTER (OUTPATIENT)
Dept: WOMENS IMAGING | Age: 58
Discharge: HOME | End: 2023-03-17
Payer: COMMERCIAL

## 2023-03-17 DIAGNOSIS — R92.8 ABNORMAL MAMMOGRAM: ICD-10-CM

## 2023-03-17 DIAGNOSIS — C50.912 MALIGNANT NEOPLASM OF LEFT FEMALE BREAST, UNSPECIFIED ESTROGEN RECEPTOR STATUS, UNSPECIFIED SITE OF BREAST (HCC): ICD-10-CM

## 2023-03-17 PROCEDURE — G0279 TOMOSYNTHESIS, MAMMO: HCPCS

## 2023-03-17 PROCEDURE — 76642 ULTRASOUND BREAST LIMITED: CPT

## 2023-03-30 ENCOUNTER — OFFICE VISIT (OUTPATIENT)
Dept: ONCOLOGY | Age: 58
End: 2023-03-30
Payer: COMMERCIAL

## 2023-03-30 ENCOUNTER — HOSPITAL ENCOUNTER (OUTPATIENT)
Dept: INFUSION THERAPY | Age: 58
Discharge: HOME | End: 2023-03-30
Payer: COMMERCIAL

## 2023-03-30 VITALS
OXYGEN SATURATION: 97 % | DIASTOLIC BLOOD PRESSURE: 91 MMHG | SYSTOLIC BLOOD PRESSURE: 148 MMHG | HEART RATE: 79 BPM | WEIGHT: 250 LBS | BODY MASS INDEX: 47.2 KG/M2 | TEMPERATURE: 97.7 F | HEIGHT: 61 IN

## 2023-03-30 DIAGNOSIS — C50.312 MALIGNANT NEOPLASM OF LOWER-INNER QUADRANT OF LEFT BREAST IN FEMALE, ESTROGEN RECEPTOR NEGATIVE (HCC): ICD-10-CM

## 2023-03-30 DIAGNOSIS — Z17.1 MALIGNANT NEOPLASM OF LOWER-INNER QUADRANT OF LEFT BREAST IN FEMALE, ESTROGEN RECEPTOR NEGATIVE (HCC): Primary | ICD-10-CM

## 2023-03-30 DIAGNOSIS — R91.8 LUNG NODULES: ICD-10-CM

## 2023-03-30 DIAGNOSIS — C50.312 MALIGNANT NEOPLASM OF LOWER-INNER QUADRANT OF LEFT BREAST IN FEMALE, ESTROGEN RECEPTOR NEGATIVE (HCC): Primary | ICD-10-CM

## 2023-03-30 DIAGNOSIS — Z17.1 MALIGNANT NEOPLASM OF LOWER-INNER QUADRANT OF LEFT BREAST IN FEMALE, ESTROGEN RECEPTOR NEGATIVE (HCC): ICD-10-CM

## 2023-03-30 DIAGNOSIS — G62.9 NEUROPATHY: ICD-10-CM

## 2023-03-30 DIAGNOSIS — R74.8 ELEVATED LIVER ENZYMES: ICD-10-CM

## 2023-03-30 LAB
ALBUMIN SERPL-MCNC: 4.5 GM/DL (ref 3.4–5)
ALP BLD-CCNC: 106 IU/L (ref 40–129)
ALT SERPL-CCNC: 24 U/L (ref 10–40)
ANION GAP SERPL CALCULATED.3IONS-SCNC: 9 MMOL/L (ref 4–16)
AST SERPL-CCNC: 27 IU/L (ref 15–37)
BASOPHILS ABSOLUTE: 0 K/CU MM
BASOPHILS RELATIVE PERCENT: 0.5 % (ref 0–1)
BILIRUB SERPL-MCNC: 0.9 MG/DL (ref 0–1)
BUN SERPL-MCNC: 12 MG/DL (ref 6–23)
CALCIUM SERPL-MCNC: 8.9 MG/DL (ref 8.3–10.6)
CHLORIDE BLD-SCNC: 99 MMOL/L (ref 99–110)
CO2: 26 MMOL/L (ref 21–32)
CREAT SERPL-MCNC: 0.4 MG/DL (ref 0.6–1.1)
DIFFERENTIAL TYPE: ABNORMAL
EOSINOPHILS ABSOLUTE: 0.1 K/CU MM
EOSINOPHILS RELATIVE PERCENT: 3.2 % (ref 0–3)
FERRITIN: 24 NG/ML (ref 15–150)
FOLATE SERPL-MCNC: 13.2 NG/ML (ref 3.1–17.5)
GFR SERPL CREATININE-BSD FRML MDRD: >60 ML/MIN/1.73M2
GLUCOSE SERPL-MCNC: 97 MG/DL (ref 70–99)
HCT VFR BLD CALC: 39.6 % (ref 37–47)
HEMOGLOBIN: 12.8 GM/DL (ref 12.5–16)
IRON: 115 UG/DL (ref 37–145)
LYMPHOCYTES ABSOLUTE: 1.1 K/CU MM
LYMPHOCYTES RELATIVE PERCENT: 26 % (ref 24–44)
MCH RBC QN AUTO: 32.7 PG (ref 27–31)
MCHC RBC AUTO-ENTMCNC: 32.3 % (ref 32–36)
MCV RBC AUTO: 101.3 FL (ref 78–100)
MONOCYTES ABSOLUTE: 0.4 K/CU MM
MONOCYTES RELATIVE PERCENT: 8.6 % (ref 0–4)
PCT TRANSFERRIN: 34 % (ref 10–44)
PDW BLD-RTO: 13.1 % (ref 11.7–14.9)
PLATELET # BLD: 196 K/CU MM (ref 140–440)
PMV BLD AUTO: 9.6 FL (ref 7.5–11.1)
POTASSIUM SERPL-SCNC: 4.1 MMOL/L (ref 3.5–5.1)
RBC # BLD: 3.91 M/CU MM (ref 4.2–5.4)
SEGMENTED NEUTROPHILS ABSOLUTE COUNT: 2.5 K/CU MM
SEGMENTED NEUTROPHILS RELATIVE PERCENT: 61.7 % (ref 36–66)
SODIUM BLD-SCNC: 134 MMOL/L (ref 135–145)
TOTAL IRON BINDING CAPACITY: 341 UG/DL (ref 250–450)
TOTAL PROTEIN: 6.5 GM/DL (ref 6.4–8.2)
UNSATURATED IRON BINDING CAPACITY: 226 UG/DL (ref 110–370)
VITAMIN B-12: 585.4 PG/ML (ref 211–911)
WBC # BLD: 4.1 K/CU MM (ref 4–10.5)

## 2023-03-30 PROCEDURE — 82746 ASSAY OF FOLIC ACID SERUM: CPT

## 2023-03-30 PROCEDURE — 3074F SYST BP LT 130 MM HG: CPT | Performed by: INTERNAL MEDICINE

## 2023-03-30 PROCEDURE — 86300 IMMUNOASSAY TUMOR CA 15-3: CPT

## 2023-03-30 PROCEDURE — 83550 IRON BINDING TEST: CPT

## 2023-03-30 PROCEDURE — 80053 COMPREHEN METABOLIC PANEL: CPT

## 2023-03-30 PROCEDURE — 85025 COMPLETE CBC W/AUTO DIFF WBC: CPT

## 2023-03-30 PROCEDURE — 3078F DIAST BP <80 MM HG: CPT | Performed by: INTERNAL MEDICINE

## 2023-03-30 PROCEDURE — 99214 OFFICE O/P EST MOD 30 MIN: CPT | Performed by: INTERNAL MEDICINE

## 2023-03-30 PROCEDURE — 82728 ASSAY OF FERRITIN: CPT

## 2023-03-30 PROCEDURE — 36415 COLL VENOUS BLD VENIPUNCTURE: CPT

## 2023-03-30 PROCEDURE — 83540 ASSAY OF IRON: CPT

## 2023-03-30 PROCEDURE — 82607 VITAMIN B-12: CPT

## 2023-03-30 PROCEDURE — 99211 OFF/OP EST MAY X REQ PHY/QHP: CPT

## 2023-03-30 RX ORDER — GABAPENTIN 100 MG/1
200 CAPSULE ORAL DAILY
Qty: 60 CAPSULE | Refills: 3 | Status: SHIPPED | OUTPATIENT
Start: 2023-03-30 | End: 2023-04-29

## 2023-03-30 ASSESSMENT — PATIENT HEALTH QUESTIONNAIRE - PHQ9
SUM OF ALL RESPONSES TO PHQ QUESTIONS 1-9: 10
1. LITTLE INTEREST OR PLEASURE IN DOING THINGS: 0
7. TROUBLE CONCENTRATING ON THINGS, SUCH AS READING THE NEWSPAPER OR WATCHING TELEVISION: 0
5. POOR APPETITE OR OVEREATING: 1
4. FEELING TIRED OR HAVING LITTLE ENERGY: 3
10. IF YOU CHECKED OFF ANY PROBLEMS, HOW DIFFICULT HAVE THESE PROBLEMS MADE IT FOR YOU TO DO YOUR WORK, TAKE CARE OF THINGS AT HOME, OR GET ALONG WITH OTHER PEOPLE: 1
9. THOUGHTS THAT YOU WOULD BE BETTER OFF DEAD, OR OF HURTING YOURSELF: 0
SUM OF ALL RESPONSES TO PHQ QUESTIONS 1-9: 10
6. FEELING BAD ABOUT YOURSELF - OR THAT YOU ARE A FAILURE OR HAVE LET YOURSELF OR YOUR FAMILY DOWN: 0
SUM OF ALL RESPONSES TO PHQ QUESTIONS 1-9: 10
3. TROUBLE FALLING OR STAYING ASLEEP: 3
SUM OF ALL RESPONSES TO PHQ9 QUESTIONS 1 & 2: 3
SUM OF ALL RESPONSES TO PHQ QUESTIONS 1-9: 10
2. FEELING DOWN, DEPRESSED OR HOPELESS: 3
8. MOVING OR SPEAKING SO SLOWLY THAT OTHER PEOPLE COULD HAVE NOTICED. OR THE OPPOSITE, BEING SO FIGETY OR RESTLESS THAT YOU HAVE BEEN MOVING AROUND A LOT MORE THAN USUAL: 0

## 2023-03-30 NOTE — PROGRESS NOTES
MA Rooming Questions  Patient: Dell Carney  MRN: [id number]    Date: 3/30/2023        1. Do you have any new issues?   no         2. Do you need any refills on medications?    no    3. Have you had any imaging done since your last visit? yes - mammogram and u/s 3/17    4. Have you been hospitalized or seen in the emergency room since your last visit here?   no    5. Did the patient have a depression screening completed today?  Yes    PHQ-9 Total Score: 10 (3/30/2023 11:03 AM)  Thoughts that you would be better off dead, or of hurting yourself in some way: 0 (3/30/2023 11:03 AM)       PHQ-9 Given to (if applicable):               PHQ-9 Score (if applicable):                     [x] Positive     []  Negative              Does question #9 need addressed (if applicable)                     [] Yes    [x]  No               Maggie Maria CMA
editing.     [id number]

## 2023-04-01 LAB — CANCER AG27-29 SERPL-ACNC: 21 U/ML

## 2023-05-23 ENCOUNTER — HOSPITAL ENCOUNTER (OUTPATIENT)
Dept: INFUSION THERAPY | Age: 58
Discharge: HOME | End: 2023-05-23
Payer: COMMERCIAL

## 2023-05-23 DIAGNOSIS — C50.312 MALIGNANT NEOPLASM OF LOWER-INNER QUADRANT OF LEFT BREAST IN FEMALE, ESTROGEN RECEPTOR NEGATIVE (HCC): Primary | ICD-10-CM

## 2023-05-23 DIAGNOSIS — Z17.1 MALIGNANT NEOPLASM OF LOWER-INNER QUADRANT OF LEFT BREAST IN FEMALE, ESTROGEN RECEPTOR NEGATIVE (HCC): Primary | ICD-10-CM

## 2023-05-23 PROCEDURE — 2580000003 HC RX 258: Performed by: INTERNAL MEDICINE

## 2023-05-23 PROCEDURE — 6360000002 HC RX W HCPCS: Performed by: INTERNAL MEDICINE

## 2023-05-23 PROCEDURE — 96523 IRRIG DRUG DELIVERY DEVICE: CPT

## 2023-05-23 RX ORDER — HEPARIN SODIUM (PORCINE) LOCK FLUSH IV SOLN 100 UNIT/ML 100 UNIT/ML
500 SOLUTION INTRAVENOUS PRN
Status: DISCONTINUED | OUTPATIENT
Start: 2023-05-23 | End: 2023-05-24 | Stop reason: HOSPADM

## 2023-05-23 RX ORDER — SODIUM CHLORIDE 9 MG/ML
25 INJECTION, SOLUTION INTRAVENOUS PRN
OUTPATIENT
Start: 2023-05-23

## 2023-05-23 RX ORDER — SODIUM CHLORIDE 0.9 % (FLUSH) 0.9 %
5-40 SYRINGE (ML) INJECTION PRN
Status: DISCONTINUED | OUTPATIENT
Start: 2023-05-23 | End: 2023-05-24 | Stop reason: HOSPADM

## 2023-05-23 RX ORDER — SODIUM CHLORIDE 0.9 % (FLUSH) 0.9 %
5-40 SYRINGE (ML) INJECTION PRN
OUTPATIENT
Start: 2023-05-23

## 2023-05-23 RX ORDER — HEPARIN SODIUM (PORCINE) LOCK FLUSH IV SOLN 100 UNIT/ML 100 UNIT/ML
500 SOLUTION INTRAVENOUS PRN
OUTPATIENT
Start: 2023-05-23

## 2023-05-23 RX ADMIN — SODIUM CHLORIDE, PRESERVATIVE FREE 30 ML: 5 INJECTION INTRAVENOUS at 09:53

## 2023-05-23 RX ADMIN — HEPARIN 500 UNITS: 100 SYRINGE at 09:53

## 2023-05-23 NOTE — PROGRESS NOTES
Pt. Here for port flush, Right upper chest mediport accessed without difficulty, good blood return noted. Port flushed per protocol and de-accessed, 2x2 and band-aide applied to site.

## 2023-06-22 ENCOUNTER — HOSPITAL ENCOUNTER (OUTPATIENT)
Age: 58
Discharge: HOME | End: 2023-06-22
Payer: COMMERCIAL

## 2023-06-22 LAB
ALBUMIN SERPL-MCNC: 4.3 GM/DL (ref 3.4–5)
ALP BLD-CCNC: 109 IU/L (ref 40–128)
ALT SERPL-CCNC: 19 U/L (ref 10–40)
ANION GAP SERPL CALCULATED.3IONS-SCNC: 12 MMOL/L (ref 4–16)
AST SERPL-CCNC: 20 IU/L (ref 15–37)
BASOPHILS ABSOLUTE: 0 K/CU MM
BASOPHILS RELATIVE PERCENT: 0.8 % (ref 0–1)
BILIRUB SERPL-MCNC: 0.9 MG/DL (ref 0–1)
BUN SERPL-MCNC: 15 MG/DL (ref 6–23)
CALCIUM SERPL-MCNC: 9.2 MG/DL (ref 8.3–10.6)
CHLORIDE BLD-SCNC: 102 MMOL/L (ref 99–110)
CHOLEST SERPL-MCNC: 231 MG/DL
CO2: 25 MMOL/L (ref 21–32)
CREAT SERPL-MCNC: 0.5 MG/DL (ref 0.6–1.1)
DIFFERENTIAL TYPE: ABNORMAL
EOSINOPHILS ABSOLUTE: 0.1 K/CU MM
EOSINOPHILS RELATIVE PERCENT: 2.7 % (ref 0–3)
ESTIMATED AVERAGE GLUCOSE: 103 MG/DL
GFR SERPL CREATININE-BSD FRML MDRD: >60 ML/MIN/1.73M2
GLUCOSE SERPL-MCNC: 100 MG/DL (ref 70–99)
HBA1C MFR BLD: 5.2 % (ref 4.2–6.3)
HCT VFR BLD CALC: 40.4 % (ref 37–47)
HDLC SERPL-MCNC: 56 MG/DL
HEMOGLOBIN: 12.8 GM/DL (ref 12.5–16)
IMMATURE NEUTROPHIL %: 0.5 % (ref 0–0.43)
LDLC SERPL CALC-MCNC: 145 MG/DL
LYMPHOCYTES ABSOLUTE: 1.1 K/CU MM
LYMPHOCYTES RELATIVE PERCENT: 28.6 % (ref 24–44)
MCH RBC QN AUTO: 32.2 PG (ref 27–31)
MCHC RBC AUTO-ENTMCNC: 31.7 % (ref 32–36)
MCV RBC AUTO: 101.5 FL (ref 78–100)
MONOCYTES ABSOLUTE: 0.3 K/CU MM
MONOCYTES RELATIVE PERCENT: 7.8 % (ref 0–4)
NUCLEATED RBC %: 0 %
PDW BLD-RTO: 11.9 % (ref 11.7–14.9)
PLATELET # BLD: 210 K/CU MM (ref 140–440)
PMV BLD AUTO: 10.5 FL (ref 7.5–11.1)
POTASSIUM SERPL-SCNC: 4.1 MMOL/L (ref 3.5–5.1)
RBC # BLD: 3.98 M/CU MM (ref 4.2–5.4)
SEGMENTED NEUTROPHILS ABSOLUTE COUNT: 2.2 K/CU MM
SEGMENTED NEUTROPHILS RELATIVE PERCENT: 59.6 % (ref 36–66)
SODIUM BLD-SCNC: 139 MMOL/L (ref 135–145)
T3 FREE: 2.9 PG/ML (ref 2.3–4.2)
TOTAL IMMATURE NEUTOROPHIL: 0.02 K/CU MM
TOTAL NUCLEATED RBC: 0 K/CU MM
TOTAL PROTEIN: 6.6 GM/DL (ref 6.4–8.2)
TRIGL SERPL-MCNC: 152 MG/DL
TSH SERPL DL<=0.005 MIU/L-ACNC: 1.92 UIU/ML (ref 0.27–4.2)
WBC # BLD: 3.7 K/CU MM (ref 4–10.5)

## 2023-06-22 PROCEDURE — 80053 COMPREHEN METABOLIC PANEL: CPT

## 2023-06-22 PROCEDURE — 84443 ASSAY THYROID STIM HORMONE: CPT

## 2023-06-22 PROCEDURE — 36415 COLL VENOUS BLD VENIPUNCTURE: CPT

## 2023-06-22 PROCEDURE — 83036 HEMOGLOBIN GLYCOSYLATED A1C: CPT

## 2023-06-22 PROCEDURE — 85025 COMPLETE CBC W/AUTO DIFF WBC: CPT

## 2023-06-22 PROCEDURE — 84481 FREE ASSAY (FT-3): CPT

## 2023-06-22 PROCEDURE — 80061 LIPID PANEL: CPT

## 2023-07-21 ENCOUNTER — OFFICE VISIT (OUTPATIENT)
Dept: ONCOLOGY | Age: 58
End: 2023-07-21
Payer: COMMERCIAL

## 2023-07-21 ENCOUNTER — HOSPITAL ENCOUNTER (OUTPATIENT)
Dept: INFUSION THERAPY | Age: 58
Discharge: HOME | End: 2023-07-21
Payer: COMMERCIAL

## 2023-07-21 VITALS
TEMPERATURE: 97.1 F | WEIGHT: 244.2 LBS | DIASTOLIC BLOOD PRESSURE: 70 MMHG | HEART RATE: 91 BPM | RESPIRATION RATE: 18 BRPM | OXYGEN SATURATION: 97 % | SYSTOLIC BLOOD PRESSURE: 133 MMHG | BODY MASS INDEX: 46.11 KG/M2 | HEIGHT: 61 IN

## 2023-07-21 DIAGNOSIS — C50.312 MALIGNANT NEOPLASM OF LOWER-INNER QUADRANT OF LEFT BREAST IN FEMALE, ESTROGEN RECEPTOR NEGATIVE (HCC): Primary | ICD-10-CM

## 2023-07-21 DIAGNOSIS — Z17.1 MALIGNANT NEOPLASM OF LOWER-INNER QUADRANT OF LEFT BREAST IN FEMALE, ESTROGEN RECEPTOR NEGATIVE (HCC): ICD-10-CM

## 2023-07-21 DIAGNOSIS — R91.8 LUNG NODULES: ICD-10-CM

## 2023-07-21 DIAGNOSIS — C50.312 MALIGNANT NEOPLASM OF LOWER-INNER QUADRANT OF LEFT BREAST IN FEMALE, ESTROGEN RECEPTOR NEGATIVE (HCC): ICD-10-CM

## 2023-07-21 DIAGNOSIS — G62.9 NEUROPATHY: ICD-10-CM

## 2023-07-21 DIAGNOSIS — Z17.1 MALIGNANT NEOPLASM OF LOWER-INNER QUADRANT OF LEFT BREAST IN FEMALE, ESTROGEN RECEPTOR NEGATIVE (HCC): Primary | ICD-10-CM

## 2023-07-21 DIAGNOSIS — R74.8 ELEVATED LIVER ENZYMES: ICD-10-CM

## 2023-07-21 LAB
ALBUMIN SERPL-MCNC: 4.3 GM/DL (ref 3.4–5)
ALP BLD-CCNC: 91 IU/L (ref 40–128)
ALT SERPL-CCNC: 23 U/L (ref 10–40)
ANION GAP SERPL CALCULATED.3IONS-SCNC: 10 MMOL/L (ref 4–16)
AST SERPL-CCNC: 20 IU/L (ref 15–37)
BASOPHILS ABSOLUTE: 0 K/CU MM
BASOPHILS RELATIVE PERCENT: 0.6 % (ref 0–1)
BILIRUB SERPL-MCNC: 0.7 MG/DL (ref 0–1)
BUN SERPL-MCNC: 15 MG/DL (ref 6–23)
CALCIUM SERPL-MCNC: 9.7 MG/DL (ref 8.3–10.6)
CHLORIDE BLD-SCNC: 105 MMOL/L (ref 99–110)
CO2: 28 MMOL/L (ref 21–32)
CREAT SERPL-MCNC: 0.6 MG/DL (ref 0.6–1.1)
DIFFERENTIAL TYPE: ABNORMAL
EOSINOPHILS ABSOLUTE: 0.2 K/CU MM
EOSINOPHILS RELATIVE PERCENT: 3.9 % (ref 0–3)
GFR SERPL CREATININE-BSD FRML MDRD: >60 ML/MIN/1.73M2
GLUCOSE SERPL-MCNC: 85 MG/DL (ref 70–99)
HCT VFR BLD CALC: 38.1 % (ref 37–47)
HEMOGLOBIN: 12.6 GM/DL (ref 12.5–16)
LYMPHOCYTES ABSOLUTE: 1.4 K/CU MM
LYMPHOCYTES RELATIVE PERCENT: 25.5 % (ref 24–44)
MCH RBC QN AUTO: 32.6 PG (ref 27–31)
MCHC RBC AUTO-ENTMCNC: 33.1 % (ref 32–36)
MCV RBC AUTO: 98.7 FL (ref 78–100)
MONOCYTES ABSOLUTE: 0.5 K/CU MM
MONOCYTES RELATIVE PERCENT: 8.3 % (ref 0–4)
PDW BLD-RTO: 12 % (ref 11.7–14.9)
PLATELET # BLD: 203 K/CU MM (ref 140–440)
PMV BLD AUTO: 9.8 FL (ref 7.5–11.1)
POTASSIUM SERPL-SCNC: 3.7 MMOL/L (ref 3.5–5.1)
RBC # BLD: 3.86 M/CU MM (ref 4.2–5.4)
SEGMENTED NEUTROPHILS ABSOLUTE COUNT: 3.4 K/CU MM
SEGMENTED NEUTROPHILS RELATIVE PERCENT: 61.7 % (ref 36–66)
SODIUM BLD-SCNC: 143 MMOL/L (ref 135–145)
TOTAL PROTEIN: 6.4 GM/DL (ref 6.4–8.2)
WBC # BLD: 5.4 K/CU MM (ref 4–10.5)

## 2023-07-21 PROCEDURE — 99211 OFF/OP EST MAY X REQ PHY/QHP: CPT

## 2023-07-21 PROCEDURE — 86300 IMMUNOASSAY TUMOR CA 15-3: CPT

## 2023-07-21 PROCEDURE — 36415 COLL VENOUS BLD VENIPUNCTURE: CPT

## 2023-07-21 PROCEDURE — 85025 COMPLETE CBC W/AUTO DIFF WBC: CPT

## 2023-07-21 PROCEDURE — 3078F DIAST BP <80 MM HG: CPT | Performed by: INTERNAL MEDICINE

## 2023-07-21 PROCEDURE — 3074F SYST BP LT 130 MM HG: CPT | Performed by: INTERNAL MEDICINE

## 2023-07-21 PROCEDURE — 99213 OFFICE O/P EST LOW 20 MIN: CPT | Performed by: INTERNAL MEDICINE

## 2023-07-21 PROCEDURE — 80053 COMPREHEN METABOLIC PANEL: CPT

## 2023-07-21 NOTE — PROGRESS NOTES
MA Rooming Questions  Patient: Della De  MRN: [id number]    Date: 7/21/2023        1. Do you have any new issues? yes - left breast area          2. Do you need any refills on medications?    no    3. Have you had any imaging done since your last visit?   no    4. Have you been hospitalized or seen in the emergency room since your last visit here?   no    5. Did the patient have a depression screening completed today?  No    No data recorded     PHQ-9 Given to (if applicable):               PHQ-9 Score (if applicable):                     [] Positive     []  Negative              Does question #9 need addressed (if applicable)                     [] Yes    []  No               Jose Fields, CMA

## 2023-07-21 NOTE — PROGRESS NOTES
Patient Name:  Shlomo Puente  Patient :  1965  Patient MRN:  [id number]     Primary Oncologist: Sahara Kapadia MD  Referring Provider: Wilberto Baires PA-C     Date of Service: 2023      Reason for Consult:  TNBC     Chief Complaint:    Chief Complaint   Patient presents with    Follow-up         Encounter Diagnoses   Name Primary? Malignant neoplasm of lower-inner quadrant of left breast in female, estrogen receptor negative ([address]) Yes    Neuropathy     Lung nodules         HPI:   2021: She had with her  to the clinic today. Reported that she was found to have a mass in the left breast on routine mammogram.  Denied any axial lymphadenopathy. Denied any weight loss. Denied any new bone pains. Denied any focal weakness. Reported blurred vision, needs new glasses. Headaches lately. But reported that she recently quit drinking alcohol. Denied any chest pain or increase shortness of breath. Denied any abdominal pain, nausea vomiting, diarrhea, constipation    21: Mammogram:  Recommend spot gene synthesis compression views and possible ultrasound for   focal asymmetry in the left upper inner breast, middle depth. BIRADS:   BIRADS - CATEGORY 0       Incomplete: Needs Additional Imaging Evaluation     11/10/21: Mammogram:  0.7 x 0.8 x 0.7 cm suspicious mass left breast 8 o'clock, about 3-4 cm from   the left nipple. This likely correlates with the mammographic finding. Ultrasound-guided biopsy is advised. Recommend post biopsy mammographic correlation to confirm that it correlates   with the mammographic finding. These results and recommendations were discussed with the patient by Dr. Michelle Campo. She will be assisted to schedule the recommended biopsy by the breast   nurse navigator. An order will be requested from her referring physician. BIRADS:   BIRADS - CATEGORY 4B       Intermediate suspicion for malignancy. Suspicious Abnormality.  Biopsy

## 2023-07-22 LAB — CANCER AG27-29 SERPL-ACNC: 18.3 U/ML

## 2023-09-07 ENCOUNTER — HOSPITAL ENCOUNTER (OUTPATIENT)
Age: 58
Discharge: HOME | End: 2023-09-07
Payer: COMMERCIAL

## 2023-09-07 ENCOUNTER — HOSPITAL ENCOUNTER (OUTPATIENT)
Dept: GENERAL RADIOLOGY | Age: 58
Discharge: HOME | End: 2023-09-07
Payer: COMMERCIAL

## 2023-09-07 DIAGNOSIS — M54.51 VERTEBROGENIC LOW BACK PAIN: ICD-10-CM

## 2023-09-07 PROCEDURE — 72100 X-RAY EXAM L-S SPINE 2/3 VWS: CPT

## 2023-10-02 NOTE — TELEPHONE ENCOUNTER
Called to discuss NEGATIVE genetic testing results with patient. No answer, left message. OK to give results on phone, need to provide copy of report to patient. She has additional results pending, so she will get additional call when panel is complete. Daily Note     Today's date: 10/2/2023  Patient name: Ana Blackmon  : 1944  MRN: [id number]  Referring provider: Anamaria Abdi MD  Dx:   Encounter Diagnosis     ICD-10-CM    1. Primary osteoarthritis of left knee  M17.12       2. Chronic pain of left knee  M25.562     G89.29                      Subjective: Patient reports left knee pain last week after being on his feet a lot. Objective: See treatment diary below  PROM: 4-125 deg    Assessment: Patient demonstrated increased knee PROM and increased hip strength. Pt was able to correct knee valgus with STS 50% with cueing. Plan: Cont. POC. Precautions:  PMHx: prostate CA  Dx: L knee OA    Manuals 9/6 9/11 9/15 9/18 9/21 10/2       L knee PROM  10"x10 ea 10"x10 ea 10"x10 ea 10"x10 ea 10"x10 ea                                              Neuro Re-Ed                                                                                                        Ther Ex             bike  L1  5 min L1  5 min L1  5 min L1  5 min L1 5 min       Heel slides 5"x10 5"x15 5"x15 5"x15 5"x15 5"x15       Quad sets 10"x10 5"x15 5"x15 5"x15 5"x15 5"x15       Supine HA stretch with self OP 15"x3 15"x3 15"x3 15"x3 15"x3 15"x3       SLR 1x10 3x10 3x10 3x12 3x12 3x15       SL hip ABD 1x10 3x10 3x10 3x12 3x12 3x15       clamshells   R/  3x10 R/  3x10 R/  3x15 R/  3x15                    Ther Activity             STS: avoid valgus  1x10 1x15 1x15 2x10 2x10       Step-ups: avoid valgus  6"/  1x15 6"/  1x20 6"/  1x20 6"  1x20 6"/ 1x20                    Gait Training                                       Modalities

## 2023-11-02 RX ORDER — HEPARIN 100 UNIT/ML
500 SYRINGE INTRAVENOUS PRN
OUTPATIENT
Start: 2023-11-02

## 2023-11-02 RX ORDER — SODIUM CHLORIDE 0.9 % (FLUSH) 0.9 %
5-40 SYRINGE (ML) INJECTION PRN
OUTPATIENT
Start: 2023-11-02

## 2023-11-02 RX ORDER — SODIUM CHLORIDE 9 MG/ML
25 INJECTION, SOLUTION INTRAVENOUS PRN
OUTPATIENT
Start: 2023-11-02

## 2023-11-15 ENCOUNTER — HOSPITAL ENCOUNTER (OUTPATIENT)
Dept: INFUSION THERAPY | Age: 58
Discharge: HOME | End: 2023-11-15
Payer: COMMERCIAL

## 2023-11-15 DIAGNOSIS — C50.312 MALIGNANT NEOPLASM OF LOWER-INNER QUADRANT OF LEFT BREAST IN FEMALE, ESTROGEN RECEPTOR NEGATIVE (HCC): Primary | ICD-10-CM

## 2023-11-15 DIAGNOSIS — Z17.1 MALIGNANT NEOPLASM OF LOWER-INNER QUADRANT OF LEFT BREAST IN FEMALE, ESTROGEN RECEPTOR NEGATIVE (HCC): Primary | ICD-10-CM

## 2023-11-15 PROCEDURE — 2580000003 HC RX 258: Performed by: INTERNAL MEDICINE

## 2023-11-15 PROCEDURE — 6360000002 HC RX W HCPCS: Performed by: INTERNAL MEDICINE

## 2023-11-15 PROCEDURE — 96523 IRRIG DRUG DELIVERY DEVICE: CPT

## 2023-11-15 RX ORDER — SODIUM CHLORIDE 0.9 % (FLUSH) 0.9 %
5-40 SYRINGE (ML) INJECTION PRN
OUTPATIENT
Start: 2023-11-15

## 2023-11-15 RX ORDER — HEPARIN 100 UNIT/ML
500 SYRINGE INTRAVENOUS PRN
Status: DISCONTINUED | OUTPATIENT
Start: 2023-11-15 | End: 2023-11-16 | Stop reason: HOSPADM

## 2023-11-15 RX ORDER — HEPARIN 100 UNIT/ML
500 SYRINGE INTRAVENOUS PRN
OUTPATIENT
Start: 2023-11-15

## 2023-11-15 RX ORDER — SODIUM CHLORIDE 9 MG/ML
25 INJECTION, SOLUTION INTRAVENOUS PRN
OUTPATIENT
Start: 2023-11-15

## 2023-11-15 RX ORDER — SODIUM CHLORIDE 0.9 % (FLUSH) 0.9 %
5-40 SYRINGE (ML) INJECTION PRN
Status: DISCONTINUED | OUTPATIENT
Start: 2023-11-15 | End: 2023-11-16 | Stop reason: HOSPADM

## 2023-11-15 RX ADMIN — HEPARIN 500 UNITS: 100 SYRINGE at 09:27

## 2023-11-15 RX ADMIN — SODIUM CHLORIDE, PRESERVATIVE FREE 20 ML: 5 INJECTION INTRAVENOUS at 09:27

## 2023-11-15 NOTE — PROGRESS NOTES
Ambulated to infusion area. Here for port flush. Right chest mediport accessed, positive blood return. Flushed per protocol. Tolerated well. No concerns voiced. Discharged in stable condition. AVS declined. Patient has My Chart.

## 2023-12-05 ENCOUNTER — TELEPHONE (OUTPATIENT)
Dept: RADIATION ONCOLOGY | Age: 58
End: 2023-12-05
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
## 2024-01-11 ENCOUNTER — HOSPITAL ENCOUNTER (OUTPATIENT)
Age: 59
Discharge: HOME | End: 2024-01-11
Payer: COMMERCIAL

## 2024-01-11 DIAGNOSIS — R91.8 LUNG NODULES: ICD-10-CM

## 2024-01-11 DIAGNOSIS — G62.9 NEUROPATHY: ICD-10-CM

## 2024-01-11 DIAGNOSIS — C50.312 MALIGNANT NEOPLASM OF LOWER-INNER QUADRANT OF LEFT BREAST IN FEMALE, ESTROGEN RECEPTOR NEGATIVE (HCC): ICD-10-CM

## 2024-01-11 DIAGNOSIS — Z17.1 MALIGNANT NEOPLASM OF LOWER-INNER QUADRANT OF LEFT BREAST IN FEMALE, ESTROGEN RECEPTOR NEGATIVE (HCC): ICD-10-CM

## 2024-01-11 LAB
ALBUMIN SERPL-MCNC: 4.4 GM/DL (ref 3.4–5)
ALP BLD-CCNC: 99 IU/L (ref 40–129)
ALT SERPL-CCNC: 17 U/L (ref 10–40)
ANION GAP SERPL CALCULATED.3IONS-SCNC: 12 MMOL/L (ref 7–16)
AST SERPL-CCNC: 16 IU/L (ref 15–37)
BASOPHILS ABSOLUTE: 0 K/CU MM
BASOPHILS RELATIVE PERCENT: 0.5 % (ref 0–1)
BILIRUB SERPL-MCNC: 0.8 MG/DL (ref 0–1)
BUN SERPL-MCNC: 11 MG/DL (ref 6–23)
CALCIUM SERPL-MCNC: 9.3 MG/DL (ref 8.3–10.6)
CHLORIDE BLD-SCNC: 105 MMOL/L (ref 99–110)
CO2: 26 MMOL/L (ref 21–32)
CREAT SERPL-MCNC: 0.5 MG/DL (ref 0.6–1.1)
DIFFERENTIAL TYPE: ABNORMAL
EOSINOPHILS ABSOLUTE: 0.1 K/CU MM
EOSINOPHILS RELATIVE PERCENT: 3.1 % (ref 0–3)
GFR SERPL CREATININE-BSD FRML MDRD: >60 ML/MIN/1.73M2
GLUCOSE SERPL-MCNC: 98 MG/DL (ref 70–99)
HCT VFR BLD CALC: 39.6 % (ref 37–47)
HEMOGLOBIN: 12.7 GM/DL (ref 12.5–16)
IMMATURE NEUTROPHIL %: 0.5 % (ref 0–0.43)
LYMPHOCYTES ABSOLUTE: 1.1 K/CU MM
LYMPHOCYTES RELATIVE PERCENT: 27.5 % (ref 24–44)
MCH RBC QN AUTO: 32.2 PG (ref 27–31)
MCHC RBC AUTO-ENTMCNC: 32.1 % (ref 32–36)
MCV RBC AUTO: 100.3 FL (ref 78–100)
MONOCYTES ABSOLUTE: 0.3 K/CU MM
MONOCYTES RELATIVE PERCENT: 6.7 % (ref 0–4)
NUCLEATED RBC %: 0 %
PDW BLD-RTO: 12.2 % (ref 11.7–14.9)
PLATELET # BLD: 203 K/CU MM (ref 140–440)
PMV BLD AUTO: 9.7 FL (ref 7.5–11.1)
POTASSIUM SERPL-SCNC: 4.2 MMOL/L (ref 3.5–5.1)
RBC # BLD: 3.95 M/CU MM (ref 4.2–5.4)
SEGMENTED NEUTROPHILS ABSOLUTE COUNT: 2.4 K/CU MM
SEGMENTED NEUTROPHILS RELATIVE PERCENT: 61.7 % (ref 36–66)
SODIUM BLD-SCNC: 143 MMOL/L (ref 135–145)
TOTAL IMMATURE NEUTOROPHIL: 0.02 K/CU MM
TOTAL NUCLEATED RBC: 0 K/CU MM
TOTAL PROTEIN: 6.7 GM/DL (ref 6.4–8.2)
WBC # BLD: 3.9 K/CU MM (ref 4–10.5)

## 2024-01-11 PROCEDURE — 85025 COMPLETE CBC W/AUTO DIFF WBC: CPT

## 2024-01-11 PROCEDURE — 86300 IMMUNOASSAY TUMOR CA 15-3: CPT

## 2024-01-11 PROCEDURE — 36415 COLL VENOUS BLD VENIPUNCTURE: CPT

## 2024-01-11 PROCEDURE — 80053 COMPREHEN METABOLIC PANEL: CPT

## 2024-01-13 LAB — CANCER AG27-29 SERPL-ACNC: 24.5 U/ML

## 2024-01-25 ENCOUNTER — OFFICE VISIT (OUTPATIENT)
Dept: ONCOLOGY | Age: 59
End: 2024-01-25
Payer: COMMERCIAL

## 2024-01-25 ENCOUNTER — HOSPITAL ENCOUNTER (OUTPATIENT)
Dept: INFUSION THERAPY | Age: 59
Discharge: HOME | End: 2024-01-25
Payer: COMMERCIAL

## 2024-01-25 VITALS
DIASTOLIC BLOOD PRESSURE: 87 MMHG | BODY MASS INDEX: 46.07 KG/M2 | HEART RATE: 88 BPM | OXYGEN SATURATION: 100 % | WEIGHT: 244 LBS | TEMPERATURE: 97.8 F | HEIGHT: 61 IN | SYSTOLIC BLOOD PRESSURE: 161 MMHG

## 2024-01-25 DIAGNOSIS — G62.9 NEUROPATHY: ICD-10-CM

## 2024-01-25 DIAGNOSIS — R91.8 LUNG NODULES: ICD-10-CM

## 2024-01-25 DIAGNOSIS — Z17.1 MALIGNANT NEOPLASM OF LOWER-INNER QUADRANT OF LEFT BREAST IN FEMALE, ESTROGEN RECEPTOR NEGATIVE (HCC): Primary | ICD-10-CM

## 2024-01-25 DIAGNOSIS — C50.312 MALIGNANT NEOPLASM OF LOWER-INNER QUADRANT OF LEFT BREAST IN FEMALE, ESTROGEN RECEPTOR NEGATIVE (HCC): Primary | ICD-10-CM

## 2024-01-25 DIAGNOSIS — N63.20 MASS OF LEFT BREAST, UNSPECIFIED QUADRANT: ICD-10-CM

## 2024-01-25 PROCEDURE — 99214 OFFICE O/P EST MOD 30 MIN: CPT | Performed by: INTERNAL MEDICINE

## 2024-01-25 PROCEDURE — 99211 OFF/OP EST MAY X REQ PHY/QHP: CPT

## 2024-01-25 PROCEDURE — 3079F DIAST BP 80-89 MM HG: CPT | Performed by: INTERNAL MEDICINE

## 2024-01-25 PROCEDURE — 3077F SYST BP >= 140 MM HG: CPT | Performed by: INTERNAL MEDICINE

## 2024-01-25 RX ORDER — LIDOCAINE 50 MG/G
PATCH TOPICAL
COMMUNITY
Start: 2023-12-08

## 2024-01-25 NOTE — PROGRESS NOTES
MA Rooming Questions  Patient: Tressa Green  MRN: [id number]    Date: 1/25/2024        1. Do you have any new issues?   no         2. Do you need any refills on medications?    no    3. Have you had any imaging done since your last visit?   yes - xr lumbar spine and labs 1/11     4. Have you been hospitalized or seen in the emergency room since your last visit here?   yes - December @ [facility]     5. Did the patient have a depression screening completed today? No    No data recorded     PHQ-9 Given to (if applicable):               PHQ-9 Score (if applicable):                     [] Positive     []  Negative              Does question #9 need addressed (if applicable)                     [] Yes    []  No               Faith Bryant [facility]

## 2024-01-25 NOTE — PROGRESS NOTES
Patient Name:  Tressa Green  Patient :  1965  Patient MRN:  [id number]     Primary Oncologist: Bibi oDnis MD  Referring Provider: Arnaldo Jessica PA-C     Date of Service: 2024      Reason for Consult:  TNBC     Chief Complaint:    Chief Complaint   Patient presents with    Follow-up    Results         Encounter Diagnoses   Name Primary?    Malignant neoplasm of lower-inner quadrant of left breast in female, estrogen receptor negative (HCC) Yes    Neuropathy     Lung nodules         HPI:   2021: She had with her  to the clinic today.  Reported that she was found to have a mass in the left breast on routine mammogram.  Denied any axial lymphadenopathy.  Denied any weight loss.  Denied any new bone pains.  Denied any focal weakness.  Reported blurred vision, needs new glasses.  Headaches lately.  But reported that she recently quit drinking alcohol.  Denied any chest pain or increase shortness of breath.  Denied any abdominal pain, nausea vomiting, diarrhea, constipation    21: Mammogram:  Recommend spot gene synthesis compression views and possible ultrasound for   focal asymmetry in the left upper inner breast, middle depth.       BIRADS:   BIRADS - CATEGORY 0       Incomplete: Needs Additional Imaging Evaluation     11/10/21: Mammogram:  0.7 x 0.8 x 0.7 cm suspicious mass left breast 8 o'clock, about 3-4 cm from   the left nipple.  This likely correlates with the mammographic finding.   Ultrasound-guided biopsy is advised.       Recommend post biopsy mammographic correlation to confirm that it correlates   with the mammographic finding.       These results and recommendations were discussed with the patient by Dr. Pabon.  She will be assisted to schedule the recommended biopsy by the breast   nurse navigator.  An order will be requested from her referring physician.       BIRADS:   BIRADS - CATEGORY 4B       Intermediate suspicion for malignancy.       Suspicious Abnormality.

## 2024-01-29 DIAGNOSIS — R93.89 ABNORMAL CT SCAN: ICD-10-CM

## 2024-01-29 DIAGNOSIS — C50.312 MALIGNANT NEOPLASM OF LOWER-INNER QUADRANT OF LEFT BREAST IN FEMALE, ESTROGEN RECEPTOR NEGATIVE (HCC): Primary | ICD-10-CM

## 2024-01-29 DIAGNOSIS — Z17.1 MALIGNANT NEOPLASM OF LOWER-INNER QUADRANT OF LEFT BREAST IN FEMALE, ESTROGEN RECEPTOR NEGATIVE (HCC): Primary | ICD-10-CM

## 2024-01-29 NOTE — PROGRESS NOTES
Per Dr. Donis - order placed per protocol for bilateral dx mammography - patient due in March; however, had an abnormal left breast finding on CT chest done on 12/7/23 at [place] (see below):    Impression    1.  No evidence of pneumonia.  2.  Indeterminate 3.2 cm lesion in the left posterior breast. Left breast skin thickening could be secondary to previous radiation therapy however cannot exclude cellulitis. Recommend correlation with dedicated mammography.    Patient will be scheduled next available.

## 2024-02-15 ENCOUNTER — HOSPITAL ENCOUNTER (OUTPATIENT)
Dept: INFUSION THERAPY | Age: 59
Discharge: HOME | End: 2024-02-15
Payer: COMMERCIAL

## 2024-02-15 DIAGNOSIS — Z17.1 MALIGNANT NEOPLASM OF LOWER-INNER QUADRANT OF LEFT BREAST IN FEMALE, ESTROGEN RECEPTOR NEGATIVE (HCC): Primary | ICD-10-CM

## 2024-02-15 DIAGNOSIS — C50.312 MALIGNANT NEOPLASM OF LOWER-INNER QUADRANT OF LEFT BREAST IN FEMALE, ESTROGEN RECEPTOR NEGATIVE (HCC): Primary | ICD-10-CM

## 2024-02-15 PROCEDURE — 6360000002 HC RX W HCPCS: Performed by: INTERNAL MEDICINE

## 2024-02-15 PROCEDURE — 96523 IRRIG DRUG DELIVERY DEVICE: CPT

## 2024-02-15 PROCEDURE — 2580000003 HC RX 258: Performed by: INTERNAL MEDICINE

## 2024-02-15 RX ORDER — HEPARIN 100 UNIT/ML
500 SYRINGE INTRAVENOUS PRN
OUTPATIENT
Start: 2024-02-15

## 2024-02-15 RX ORDER — SODIUM CHLORIDE 9 MG/ML
25 INJECTION, SOLUTION INTRAVENOUS PRN
OUTPATIENT
Start: 2024-02-15

## 2024-02-15 RX ORDER — SODIUM CHLORIDE 0.9 % (FLUSH) 0.9 %
5-40 SYRINGE (ML) INJECTION PRN
Status: DISCONTINUED | OUTPATIENT
Start: 2024-02-15 | End: 2024-02-16 | Stop reason: HOSPADM

## 2024-02-15 RX ORDER — HEPARIN 100 UNIT/ML
500 SYRINGE INTRAVENOUS PRN
Status: DISCONTINUED | OUTPATIENT
Start: 2024-02-15 | End: 2024-02-16 | Stop reason: HOSPADM

## 2024-02-15 RX ORDER — SODIUM CHLORIDE 0.9 % (FLUSH) 0.9 %
5-40 SYRINGE (ML) INJECTION PRN
OUTPATIENT
Start: 2024-02-15

## 2024-02-15 RX ADMIN — HEPARIN 500 UNITS: 100 SYRINGE at 14:28

## 2024-02-15 RX ADMIN — SODIUM CHLORIDE, PRESERVATIVE FREE 20 ML: 5 INJECTION INTRAVENOUS at 14:28

## 2024-02-15 NOTE — PROGRESS NOTES
Patient arrived to treatment suite for port flush.  right chest mediport accessed and flushed with normal saline, good blood return noted.  Flushed and heparin locked, band-aid applied.  Patient tolerated well.  Left treatment suite ambulatory. Declined AVS

## 2024-03-07 ENCOUNTER — OFFICE VISIT (OUTPATIENT)
Dept: OBGYN | Age: 59
End: 2024-03-07
Payer: COMMERCIAL

## 2024-03-07 ENCOUNTER — HOSPITAL ENCOUNTER (OUTPATIENT)
Age: 59
Discharge: HOME | End: 2024-03-07
Payer: COMMERCIAL

## 2024-03-07 ENCOUNTER — HOSPITAL ENCOUNTER (OUTPATIENT)
Dept: GENERAL RADIOLOGY | Age: 59
Discharge: HOME | End: 2024-03-07
Payer: COMMERCIAL

## 2024-03-07 DIAGNOSIS — Z78.0 ENCOUNTER FOR OSTEOPOROSIS SCREENING IN ASYMPTOMATIC POSTMENOPAUSAL PATIENT: ICD-10-CM

## 2024-03-07 DIAGNOSIS — Z13.820 ENCOUNTER FOR OSTEOPOROSIS SCREENING IN ASYMPTOMATIC POSTMENOPAUSAL PATIENT: ICD-10-CM

## 2024-03-07 DIAGNOSIS — M79.671 RIGHT FOOT PAIN: ICD-10-CM

## 2024-03-07 DIAGNOSIS — N90.4 LICHEN SCLEROSUS ET ATROPHICUS OF THE VULVA: ICD-10-CM

## 2024-03-07 DIAGNOSIS — Z12.31 SCREENING MAMMOGRAM FOR BREAST CANCER: ICD-10-CM

## 2024-03-07 DIAGNOSIS — Z01.419 ENCOUNTER FOR ANNUAL ROUTINE GYNECOLOGICAL EXAMINATION: Primary | ICD-10-CM

## 2024-03-07 LAB
ALP BLD-CCNC: 96 IU/L (ref 40–128)
ALT SERPL-CCNC: 16 U/L (ref 10–40)
ANION GAP SERPL CALCULATED.3IONS-SCNC: 9 MMOL/L (ref 7–16)
AST SERPL-CCNC: 17 IU/L (ref 15–37)
BASOPHILS RELATIVE PERCENT: 0.8 % (ref 0–1)
BILIRUB SERPL-MCNC: 0.6 MG/DL (ref 0–1)
BUN SERPL-MCNC: 17 MG/DL (ref 6–23)
CALCIUM SERPL-MCNC: 9.2 MG/DL (ref 8.3–10.6)
CHLORIDE BLD-SCNC: 103 MMOL/L (ref 99–110)
CHOLESTEROL, FASTING: 216 MG/DL
CO2: 26 MMOL/L (ref 21–32)
CREAT SERPL-MCNC: 0.5 MG/DL (ref 0.6–1.1)
DIFFERENTIAL TYPE: ABNORMAL
EOSINOPHILS ABSOLUTE: 0.2 K/CU MM
EOSINOPHILS RELATIVE PERCENT: 3.2 % (ref 0–3)
GLUCOSE SERPL-MCNC: 90 MG/DL (ref 70–99)
HBA1C MFR BLD: 5.4 % (ref 4.2–6.3)
HCT VFR BLD CALC: 39.4 % (ref 37–47)
HDLC SERPL-MCNC: 51 MG/DL
HEMOGLOBIN: 12.7 GM/DL (ref 12.5–16)
LYMPHOCYTES ABSOLUTE: 1.4 K/CU MM
LYMPHOCYTES RELATIVE PERCENT: 28.3 % (ref 24–44)
MCH RBC QN AUTO: 31.7 PG (ref 27–31)
MCHC RBC AUTO-ENTMCNC: 32.2 % (ref 32–36)
MCV RBC AUTO: 98.3 FL (ref 78–100)
MONOCYTES ABSOLUTE: 0.4 K/CU MM
MONOCYTES RELATIVE PERCENT: 8.6 % (ref 0–4)
PDW BLD-RTO: 11.9 % (ref 11.7–14.9)
PLATELET # BLD: 210 K/CU MM (ref 140–440)
PMV BLD AUTO: 10 FL (ref 7.5–11.1)
RBC # BLD: 4.01 M/CU MM (ref 4.2–5.4)
SEGMENTED NEUTROPHILS ABSOLUTE COUNT: 3 K/CU MM
SODIUM BLD-SCNC: 138 MMOL/L (ref 135–145)
T3 FREE: 3 PG/ML (ref 2.3–4.2)
T4 FREE SERPL-MCNC: 0.99 NG/DL (ref 0.9–1.8)
TOTAL IMMATURE NEUTOROPHIL: 0.02 K/CU MM
TOTAL PROTEIN: 6.7 GM/DL (ref 6.4–8.2)
TRIGLYCERIDE, FASTING: 137 MG/DL
TSH SERPL DL<=0.005 MIU/L-ACNC: 3.34 UIU/ML (ref 0.27–4.2)
WBC # BLD: 5 K/CU MM (ref 4–10.5)

## 2024-03-07 PROCEDURE — 83036 HEMOGLOBIN GLYCOSYLATED A1C: CPT

## 2024-03-07 PROCEDURE — 80053 COMPREHEN METABOLIC PANEL: CPT

## 2024-03-07 PROCEDURE — 84439 ASSAY OF FREE THYROXINE: CPT

## 2024-03-07 PROCEDURE — 85025 COMPLETE CBC W/AUTO DIFF WBC: CPT

## 2024-03-07 PROCEDURE — 99386 PREV VISIT NEW AGE 40-64: CPT | Performed by: OBSTETRICS & GYNECOLOGY

## 2024-03-07 PROCEDURE — 80061 LIPID PANEL: CPT

## 2024-03-07 PROCEDURE — 84481 FREE ASSAY (FT-3): CPT

## 2024-03-07 PROCEDURE — 84443 ASSAY THYROID STIM HORMONE: CPT

## 2024-03-07 PROCEDURE — 36415 COLL VENOUS BLD VENIPUNCTURE: CPT

## 2024-03-07 PROCEDURE — 73630 X-RAY EXAM OF FOOT: CPT

## 2024-03-07 RX ORDER — CLOBETASOL PROPIONATE 0.5 MG/G
OINTMENT TOPICAL
Qty: 30 G | Refills: 5 | Status: SHIPPED | OUTPATIENT
Start: 2024-03-07

## 2024-03-07 SDOH — ECONOMIC STABILITY: FOOD INSECURITY: WITHIN THE PAST 12 MONTHS, YOU WORRIED THAT YOUR FOOD WOULD RUN OUT BEFORE YOU GOT MONEY TO BUY MORE.: NEVER TRUE

## 2024-03-07 SDOH — ECONOMIC STABILITY: HOUSING INSECURITY
IN THE LAST 12 MONTHS, WAS THERE A TIME WHEN YOU DID NOT HAVE A STEADY PLACE TO SLEEP OR SLEPT IN A SHELTER (INCLUDING NOW)?: NO

## 2024-03-07 SDOH — ECONOMIC STABILITY: INCOME INSECURITY: HOW HARD IS IT FOR YOU TO PAY FOR THE VERY BASICS LIKE FOOD, HOUSING, MEDICAL CARE, AND HEATING?: SOMEWHAT HARD

## 2024-03-07 SDOH — ECONOMIC STABILITY: FOOD INSECURITY: WITHIN THE PAST 12 MONTHS, THE FOOD YOU BOUGHT JUST DIDN'T LAST AND YOU DIDN'T HAVE MONEY TO GET MORE.: NEVER TRUE

## 2024-03-07 ASSESSMENT — PATIENT HEALTH QUESTIONNAIRE - PHQ9
SUM OF ALL RESPONSES TO PHQ QUESTIONS 1-9: 9
9. THOUGHTS THAT YOU WOULD BE BETTER OFF DEAD, OR OF HURTING YOURSELF: 0
4. FEELING TIRED OR HAVING LITTLE ENERGY: 1
SUM OF ALL RESPONSES TO PHQ QUESTIONS 1-9: 9
6. FEELING BAD ABOUT YOURSELF - OR THAT YOU ARE A FAILURE OR HAVE LET YOURSELF OR YOUR FAMILY DOWN: 3
2. FEELING DOWN, DEPRESSED OR HOPELESS: 0
5. POOR APPETITE OR OVEREATING: 1
SUM OF ALL RESPONSES TO PHQ QUESTIONS 1-9: 9
8. MOVING OR SPEAKING SO SLOWLY THAT OTHER PEOPLE COULD HAVE NOTICED. OR THE OPPOSITE, BEING SO FIGETY OR RESTLESS THAT YOU HAVE BEEN MOVING AROUND A LOT MORE THAN USUAL: 0
SUM OF ALL RESPONSES TO PHQ QUESTIONS 1-9: 9
3. TROUBLE FALLING OR STAYING ASLEEP: 3
7. TROUBLE CONCENTRATING ON THINGS, SUCH AS READING THE NEWSPAPER OR WATCHING TELEVISION: 1
10. IF YOU CHECKED OFF ANY PROBLEMS, HOW DIFFICULT HAVE THESE PROBLEMS MADE IT FOR YOU TO DO YOUR WORK, TAKE CARE OF THINGS AT HOME, OR GET ALONG WITH OTHER PEOPLE: 0
SUM OF ALL RESPONSES TO PHQ9 QUESTIONS 1 & 2: 0
1. LITTLE INTEREST OR PLEASURE IN DOING THINGS: 0

## 2024-03-07 NOTE — PROGRESS NOTES
3/7/24    Tressa Green  1965    Chief Complaint   Patient presents with    New Patient     Pt here for annual, is not sexually active, had hyster with ovary removal, hrt-none, pap-unsure, mammo-3/17/2023-neg, dtmg-8810-sel, colonoscopy-2023-normal.     Other     Pt c/o vulvar itching x 1 yr. Pt states has hx of lichen sclerosis. Pt states ran out of rx x 2 months ago.         Tressa Green is a 58 y.o. female who presents today for evaluation of annual exam and lichen slerosus    Past Medical History:   Diagnosis Date    Breast cancer (HCC) 12/2021    left    Depression     History of external beam radiation therapy 09/2022    Left Breast 5,240 cGy in 21 Fractions    History of therapeutic radiation     Hx antineoplastic chemo     Hyperlipidemia     Hypertension     Hypothyroidism     Lichen sclerosus        Past Surgical History:   Procedure Laterality Date    BREAST LUMPECTOMY Left 2022    HYSTERECTOMY (CERVIX STATUS UNKNOWN)      HYSTERECTOMY, VAGINAL  12/02/2021    OVARY REMOVAL Bilateral     PORT SURGERY      US BREAST BIOPSY W LOC DEVICE 1ST LESION LEFT Left 12/10/2021    US BREAST NEEDLE BIOPSY LEFT 12/10/2021 Asim Stovall MD [facility]       Social History     Tobacco Use    Smoking status: Never    Smokeless tobacco: Never    Tobacco comments:     at age 18   Vaping Use    Vaping Use: Some days    Substances: THC   Substance Use Topics    Alcohol use: Yes     Alcohol/week: 5.0 standard drinks of alcohol     Types: 5 Cans of beer per week    Drug use: Yes     Types: Marijuana (Weed)       Family History   Problem Relation Age of Onset    Breast Cancer Paternal Aunt     Ovarian Cancer Neg Hx        Current Outpatient Medications   Medication Sig Dispense Refill    clobetasol (TEMOVATE) 0.05 % ointment Apply topically 2 times daily for the first month, then once daily for the second month, then 2-3 times per week. 30 g 5    Cholecalciferol (VITAMIN D3 PO) Take 1 tablet by mouth daily

## 2024-03-15 ENCOUNTER — HOSPITAL ENCOUNTER (OUTPATIENT)
Dept: WOMENS IMAGING | Age: 59
Discharge: HOME | End: 2024-03-15
Attending: OBSTETRICS & GYNECOLOGY
Payer: COMMERCIAL

## 2024-03-15 ENCOUNTER — HOSPITAL ENCOUNTER (OUTPATIENT)
Dept: ULTRASOUND IMAGING | Age: 59
Discharge: HOME | End: 2024-03-15
Attending: INTERNAL MEDICINE
Payer: COMMERCIAL

## 2024-03-15 ENCOUNTER — HOSPITAL ENCOUNTER (OUTPATIENT)
Dept: WOMENS IMAGING | Age: 59
Discharge: HOME | End: 2024-03-15
Attending: INTERNAL MEDICINE
Payer: COMMERCIAL

## 2024-03-15 DIAGNOSIS — R93.89 ABNORMAL CT SCAN: ICD-10-CM

## 2024-03-15 DIAGNOSIS — Z78.0 ENCOUNTER FOR OSTEOPOROSIS SCREENING IN ASYMPTOMATIC POSTMENOPAUSAL PATIENT: ICD-10-CM

## 2024-03-15 DIAGNOSIS — C50.312 MALIGNANT NEOPLASM OF LOWER-INNER QUADRANT OF LEFT BREAST IN FEMALE, ESTROGEN RECEPTOR NEGATIVE (HCC): ICD-10-CM

## 2024-03-15 DIAGNOSIS — Z17.1 MALIGNANT NEOPLASM OF LOWER-INNER QUADRANT OF LEFT BREAST IN FEMALE, ESTROGEN RECEPTOR NEGATIVE (HCC): ICD-10-CM

## 2024-03-15 DIAGNOSIS — Z13.820 ENCOUNTER FOR OSTEOPOROSIS SCREENING IN ASYMPTOMATIC POSTMENOPAUSAL PATIENT: ICD-10-CM

## 2024-03-15 PROCEDURE — G0279 TOMOSYNTHESIS, MAMMO: HCPCS

## 2024-03-15 PROCEDURE — 77080 DXA BONE DENSITY AXIAL: CPT

## 2024-03-15 PROCEDURE — 76642 ULTRASOUND BREAST LIMITED: CPT

## 2024-04-01 ENCOUNTER — OFFICE VISIT (OUTPATIENT)
Dept: ONCOLOGY | Age: 59
End: 2024-04-01
Payer: COMMERCIAL

## 2024-04-01 ENCOUNTER — HOSPITAL ENCOUNTER (OUTPATIENT)
Dept: INFUSION THERAPY | Age: 59
Discharge: HOME | End: 2024-04-01
Payer: COMMERCIAL

## 2024-04-01 VITALS
BODY MASS INDEX: 45.91 KG/M2 | HEART RATE: 62 BPM | TEMPERATURE: 97.7 F | HEIGHT: 61 IN | WEIGHT: 243.2 LBS | DIASTOLIC BLOOD PRESSURE: 55 MMHG | OXYGEN SATURATION: 95 % | SYSTOLIC BLOOD PRESSURE: 116 MMHG

## 2024-04-01 DIAGNOSIS — Z17.1 MALIGNANT NEOPLASM OF LOWER-INNER QUADRANT OF LEFT BREAST IN FEMALE, ESTROGEN RECEPTOR NEGATIVE (HCC): Primary | ICD-10-CM

## 2024-04-01 DIAGNOSIS — C50.312 MALIGNANT NEOPLASM OF LOWER-INNER QUADRANT OF LEFT BREAST IN FEMALE, ESTROGEN RECEPTOR NEGATIVE (HCC): Primary | ICD-10-CM

## 2024-04-01 PROCEDURE — 3078F DIAST BP <80 MM HG: CPT | Performed by: INTERNAL MEDICINE

## 2024-04-01 PROCEDURE — 99211 OFF/OP EST MAY X REQ PHY/QHP: CPT

## 2024-04-01 PROCEDURE — 3074F SYST BP LT 130 MM HG: CPT | Performed by: INTERNAL MEDICINE

## 2024-04-01 PROCEDURE — 99214 OFFICE O/P EST MOD 30 MIN: CPT | Performed by: INTERNAL MEDICINE

## 2024-04-01 RX ORDER — PRAVASTATIN SODIUM 20 MG
TABLET ORAL
COMMUNITY
Start: 2024-03-07

## 2024-04-01 NOTE — PROGRESS NOTES
Patient Name:  Tressa Green  Patient :  1965  Patient MRN:  [id number]     Primary Oncologist: Bibi Donis MD  Referring Provider: Arnaldo Jessica PA-C     Date of Service: 2024      Reason for Consult:  TNBC     Chief Complaint:    Chief Complaint   Patient presents with    Results         Encounter Diagnosis   Name Primary?    Malignant neoplasm of lower-inner quadrant of left breast in female, estrogen receptor negative (HCC) Yes        HPI:   2021: She had with her  to the clinic today.  Reported that she was found to have a mass in the left breast on routine mammogram.  Denied any axial lymphadenopathy.  Denied any weight loss.  Denied any new bone pains.  Denied any focal weakness.  Reported blurred vision, needs new glasses.  Headaches lately.  But reported that she recently quit drinking alcohol.  Denied any chest pain or increase shortness of breath.  Denied any abdominal pain, nausea vomiting, diarrhea, constipation    21: Mammogram:  Recommend spot gene synthesis compression views and possible ultrasound for   focal asymmetry in the left upper inner breast, middle depth.       BIRADS:   BIRADS - CATEGORY 0       Incomplete: Needs Additional Imaging Evaluation     11/10/21: Mammogram:  0.7 x 0.8 x 0.7 cm suspicious mass left breast 8 o'clock, about 3-4 cm from   the left nipple.  This likely correlates with the mammographic finding.   Ultrasound-guided biopsy is advised.       Recommend post biopsy mammographic correlation to confirm that it correlates   with the mammographic finding.       These results and recommendations were discussed with the patient by Dr. Pabon.  She will be assisted to schedule the recommended biopsy by the breast   nurse navigator.  An order will be requested from her referring physician.       BIRADS:   BIRADS - CATEGORY 4B       Intermediate suspicion for malignancy.       Suspicious Abnormality. Biopsy should be considered at this time.

## 2024-04-01 NOTE — PROGRESS NOTES
MA Rooming Questions  Patient: Tressa Green  MRN: [id number]    Date: 4/1/2024        1. Do you have any new issues?   no         2. Do you need any refills on medications?    no    3. Have you had any imaging done since your last visit?   yes - U/S and Mammogram 3/15    4. Have you been hospitalized or seen in the emergency room since your last visit here?   no    5. Did the patient have a depression screening completed today? No    No data recorded     PHQ-9 Given to (if applicable):               PHQ-9 Score (if applicable):                     [] Positive     []  Negative              Does question #9 need addressed (if applicable)                     [] Yes    []  No               Ramona Granados MA

## 2024-04-03 ENCOUNTER — CLINICAL DOCUMENTATION (OUTPATIENT)
Dept: ONCOLOGY | Age: 59
End: 2024-04-03

## 2024-04-03 NOTE — PROGRESS NOTES
Called and spoke with patient about Dr. Donis's recommendation to see surgeon prior to scheduling any breast aspiration/biopsy.  Phone number for Dr. Nova Balderas's office given to patient to call directly and get appointment - patient is scheduled next Wednesday, 4/10/24 at 1100.  Instructed patient to call this RN back with any questions/concerns.  Patient voiced understanding of above.

## 2024-04-08 NOTE — ADDENDUM NOTE
Encounter addended by: Miriam Collins on: 4/8/2024 10:35 AM   Actions taken: Charge Capture section accepted

## 2024-05-09 ENCOUNTER — OFFICE VISIT (OUTPATIENT)
Dept: OBGYN | Age: 59
End: 2024-05-09
Payer: COMMERCIAL

## 2024-05-09 VITALS
DIASTOLIC BLOOD PRESSURE: 83 MMHG | BODY MASS INDEX: 45.31 KG/M2 | SYSTOLIC BLOOD PRESSURE: 119 MMHG | WEIGHT: 240 LBS | HEIGHT: 61 IN

## 2024-05-09 DIAGNOSIS — N95.2 ATROPHIC VAGINITIS: ICD-10-CM

## 2024-05-09 DIAGNOSIS — N90.4 LICHEN SCLEROSUS ET ATROPHICUS OF THE VULVA: Primary | ICD-10-CM

## 2024-05-09 PROCEDURE — 3079F DIAST BP 80-89 MM HG: CPT | Performed by: OBSTETRICS & GYNECOLOGY

## 2024-05-09 PROCEDURE — 3074F SYST BP LT 130 MM HG: CPT | Performed by: OBSTETRICS & GYNECOLOGY

## 2024-05-09 PROCEDURE — 99213 OFFICE O/P EST LOW 20 MIN: CPT | Performed by: OBSTETRICS & GYNECOLOGY

## 2024-05-09 RX ORDER — ESTRADIOL 0.1 MG/G
CREAM VAGINAL
Qty: 3 EACH | Refills: 3 | Status: SHIPPED | OUTPATIENT
Start: 2024-05-10

## 2024-05-09 RX ORDER — CLOBETASOL PROPIONATE 0.5 MG/G
OINTMENT TOPICAL
Qty: 30 G | Refills: 5 | Status: SHIPPED | OUTPATIENT
Start: 2024-05-09

## 2024-05-09 NOTE — PROGRESS NOTES
5/9/24    Tressa Green  1965    Chief Complaint   Patient presents with    Other     Pt c/o lichen sclerosus. Pt states since starting clobetasol ointment sx have improved.         Tressa Green is a 58 y.o. female who presents today for evaluation of lichen sclerosus.  Irritation is 100% resolved.      Past Medical History:   Diagnosis Date    Breast cancer (HCC) 12/2021    left    Depression     History of external beam radiation therapy 09/2022    Left Breast 5,240 cGy in 21 Fractions    History of therapeutic radiation     Hx antineoplastic chemo     Hyperlipidemia     Hypertension     Hypothyroidism     Lichen sclerosus        Past Surgical History:   Procedure Laterality Date    BREAST LUMPECTOMY Left 2022    HYSTERECTOMY (CERVIX STATUS UNKNOWN)      HYSTERECTOMY, VAGINAL  12/02/2021    OVARY REMOVAL Bilateral     PORT SURGERY      US BREAST BIOPSY W LOC DEVICE 1ST LESION LEFT Left 12/10/2021    US BREAST NEEDLE BIOPSY LEFT 12/10/2021 Asim Stovall MD [facility]       Social History     Tobacco Use    Smoking status: Never    Smokeless tobacco: Never    Tobacco comments:     at age 18   Vaping Use    Vaping Use: Some days    Substances: THC   Substance Use Topics    Alcohol use: Yes     Alcohol/week: 5.0 standard drinks of alcohol     Types: 5 Cans of beer per week    Drug use: Yes     Types: Marijuana (Weed)       Family History   Problem Relation Age of Onset    Breast Cancer Paternal Aunt     Ovarian Cancer Neg Hx        Current Outpatient Medications   Medication Sig Dispense Refill    [START ON 5/10/2024] estradiol (ESTRACE VAGINAL) 0.1 MG/GM vaginal cream Use 1 g vaginally nightly for 2 weeks, then 1 g nightly 2-3 times per week. 3 each 3    clobetasol (TEMOVATE) 0.05 % ointment Apply topically  2-3 times per week. 30 g 5    ezetimibe (ZETIA) 10 MG tablet Take 1 tablet by mouth daily      lisinopril (PRINIVIL;ZESTRIL) 20 MG tablet TAKE ONE TABLET BY MOUTH DAILY 90 tablet 1    acyclovir

## 2024-05-23 ENCOUNTER — HOSPITAL ENCOUNTER (OUTPATIENT)
Dept: WOMENS IMAGING | Age: 59
Discharge: HOME | End: 2024-05-23
Payer: COMMERCIAL

## 2024-05-23 ENCOUNTER — HOSPITAL ENCOUNTER (OUTPATIENT)
Dept: ULTRASOUND IMAGING | Age: 59
Discharge: HOME | End: 2024-05-23
Payer: COMMERCIAL

## 2024-05-23 DIAGNOSIS — C50.919 TRIPLE NEGATIVE MALIGNANT NEOPLASM OF BREAST (HCC): ICD-10-CM

## 2024-05-23 DIAGNOSIS — N63.20 MASS OF LEFT BREAST ON MAMMOGRAM: ICD-10-CM

## 2024-05-23 PROCEDURE — 77065 DX MAMMO INCL CAD UNI: CPT

## 2024-05-23 PROCEDURE — 19083 BX BREAST 1ST LESION US IMAG: CPT

## 2024-06-05 ENCOUNTER — HOSPITAL ENCOUNTER (OUTPATIENT)
Dept: INFUSION THERAPY | Age: 59
Discharge: HOME | End: 2024-06-05
Payer: COMMERCIAL

## 2024-06-05 ENCOUNTER — OFFICE VISIT (OUTPATIENT)
Dept: ONCOLOGY | Age: 59
End: 2024-06-05
Payer: COMMERCIAL

## 2024-06-05 VITALS
BODY MASS INDEX: 45.12 KG/M2 | SYSTOLIC BLOOD PRESSURE: 121 MMHG | HEART RATE: 96 BPM | HEIGHT: 61 IN | TEMPERATURE: 97.7 F | OXYGEN SATURATION: 96 % | WEIGHT: 239 LBS | RESPIRATION RATE: 18 BRPM | DIASTOLIC BLOOD PRESSURE: 77 MMHG

## 2024-06-05 DIAGNOSIS — C50.312 MALIGNANT NEOPLASM OF LOWER-INNER QUADRANT OF LEFT BREAST IN FEMALE, ESTROGEN RECEPTOR NEGATIVE (HCC): Primary | ICD-10-CM

## 2024-06-05 DIAGNOSIS — Z17.1 MALIGNANT NEOPLASM OF LOWER-INNER QUADRANT OF LEFT BREAST IN FEMALE, ESTROGEN RECEPTOR NEGATIVE (HCC): Primary | ICD-10-CM

## 2024-06-05 DIAGNOSIS — N63.20 MASS OF LEFT BREAST, UNSPECIFIED QUADRANT: ICD-10-CM

## 2024-06-05 DIAGNOSIS — R91.8 LUNG NODULES: ICD-10-CM

## 2024-06-05 DIAGNOSIS — G62.9 NEUROPATHY: ICD-10-CM

## 2024-06-05 PROCEDURE — 3074F SYST BP LT 130 MM HG: CPT | Performed by: INTERNAL MEDICINE

## 2024-06-05 PROCEDURE — 96523 IRRIG DRUG DELIVERY DEVICE: CPT

## 2024-06-05 PROCEDURE — 2580000003 HC RX 258: Performed by: INTERNAL MEDICINE

## 2024-06-05 PROCEDURE — 3078F DIAST BP <80 MM HG: CPT | Performed by: INTERNAL MEDICINE

## 2024-06-05 PROCEDURE — 99213 OFFICE O/P EST LOW 20 MIN: CPT | Performed by: INTERNAL MEDICINE

## 2024-06-05 RX ORDER — HEPARIN 100 UNIT/ML
500 SYRINGE INTRAVENOUS PRN
OUTPATIENT
Start: 2024-06-05

## 2024-06-05 RX ORDER — SODIUM CHLORIDE 0.9 % (FLUSH) 0.9 %
5-40 SYRINGE (ML) INJECTION PRN
Status: DISCONTINUED | OUTPATIENT
Start: 2024-06-05 | End: 2024-06-06 | Stop reason: HOSPADM

## 2024-06-05 RX ORDER — SODIUM CHLORIDE 0.9 % (FLUSH) 0.9 %
5-40 SYRINGE (ML) INJECTION PRN
OUTPATIENT
Start: 2024-06-05

## 2024-06-05 RX ORDER — SODIUM CHLORIDE 9 MG/ML
25 INJECTION, SOLUTION INTRAVENOUS PRN
OUTPATIENT
Start: 2024-06-05

## 2024-06-05 RX ADMIN — SODIUM CHLORIDE, PRESERVATIVE FREE 20 ML: 5 INJECTION INTRAVENOUS at 14:09

## 2024-06-05 NOTE — PROGRESS NOTES
Patient arrived to treatment suite for mediport flush.  Right chest mediport accessed and flushed with normal saline, good blood return noted.  De-accessed and band-aid applied.  Patient tolerated well.  Left treatment suite ambulatory.  Discharge instructions declined.

## 2024-06-05 NOTE — PROGRESS NOTES
MA Rooming Questions  Patient: Tressa Green  MRN: [id number]    Date: 6/5/2024        1. Do you have any new issues?   no         2. Do you need any refills on medications?    no    3. Have you had any imaging done since your last visit?   yes - u/s on breast with bx in May 2024    4. Have you been hospitalized or seen in the emergency room since your last visit here?   no    5. Did the patient have a depression screening completed today? No    No data recorded     PHQ-9 Given to (if applicable):               PHQ-9 Score (if applicable):                     [] Positive     []  Negative              Does question #9 need addressed (if applicable)                     [] Yes    []  No               Perlita Sheriff MA    
However diagnostic aspiration versus biopsy is recommended to exclude the less likely possibility of local recurrence of malignancy.    3/15/2024 bone density scan normal    5/23/24: Final Pathologic Diagnosis:   Left breast, mass at 8:00, needle core biopsy:   -     Breast tissue with fat necrosis, hemorrhage, fibrosis,   and giant cell reaction.   -     Negative for carcinoma.   Past Medical History:     Hyperlipidemia, hypertension, depression                                                           Past Surgery History:    Total abdominal hysterectomy, bilateral salpingo-oophorectomy                                                                          Social History:   Lives with a partner.  Has 2 children 25 and 33, 2 daughters.  Quit smoking about 25 years ago prior to which she smoked 1 pack/day for 7 years.  Quit drinking alcohol on 12/2/2021 prior to which she consumed 4-5 beers per day and also whiskey sometimes.  Currently on sick leave, was an  at [employer].                                                                                                  Family History:    Father diagnosed with prostate cancer                                                                                               No Known Allergies    Current Outpatient Medications on File Prior to Visit   Medication Sig Dispense Refill    estradiol (ESTRACE VAGINAL) 0.1 MG/GM vaginal cream Use 1 g vaginally nightly for 2 weeks, then 1 g nightly 2-3 times per week. 3 each 3    clobetasol (TEMOVATE) 0.05 % ointment Apply topically  2-3 times per week. 30 g 5    ezetimibe (ZETIA) 10 MG tablet Take 1 tablet by mouth daily      lisinopril (PRINIVIL;ZESTRIL) 20 MG tablet TAKE ONE TABLET BY MOUTH DAILY 90 tablet 1    acyclovir (ZOVIRAX) 400 MG tablet Take 1 tablet by mouth daily 90 tablet 1     No current facility-administered medications on file prior to visit.     Interval history: 6/5/2024: She arrived alone to the

## 2024-09-11 ENCOUNTER — HOSPITAL ENCOUNTER (OUTPATIENT)
Dept: INFUSION THERAPY | Age: 59
Discharge: HOME | End: 2024-09-11
Payer: COMMERCIAL

## 2024-09-11 ENCOUNTER — OFFICE VISIT (OUTPATIENT)
Dept: ONCOLOGY | Age: 59
End: 2024-09-11
Payer: COMMERCIAL

## 2024-09-11 VITALS
SYSTOLIC BLOOD PRESSURE: 118 MMHG | HEART RATE: 104 BPM | WEIGHT: 234.8 LBS | TEMPERATURE: 97.9 F | BODY MASS INDEX: 44.33 KG/M2 | OXYGEN SATURATION: 98 % | DIASTOLIC BLOOD PRESSURE: 68 MMHG | HEIGHT: 61 IN | RESPIRATION RATE: 18 BRPM

## 2024-09-11 DIAGNOSIS — Z17.1 MALIGNANT NEOPLASM OF BREAST IN FEMALE, ESTROGEN RECEPTOR NEGATIVE, UNSPECIFIED LATERALITY, UNSPECIFIED SITE OF BREAST (HCC): ICD-10-CM

## 2024-09-11 DIAGNOSIS — Z17.1 MALIGNANT NEOPLASM OF BREAST IN FEMALE, ESTROGEN RECEPTOR NEGATIVE, UNSPECIFIED LATERALITY, UNSPECIFIED SITE OF BREAST (HCC): Primary | ICD-10-CM

## 2024-09-11 DIAGNOSIS — C50.919 MALIGNANT NEOPLASM OF BREAST IN FEMALE, ESTROGEN RECEPTOR NEGATIVE, UNSPECIFIED LATERALITY, UNSPECIFIED SITE OF BREAST (HCC): Primary | ICD-10-CM

## 2024-09-11 DIAGNOSIS — R91.8 LUNG NODULES: ICD-10-CM

## 2024-09-11 DIAGNOSIS — C50.919 MALIGNANT NEOPLASM OF BREAST IN FEMALE, ESTROGEN RECEPTOR NEGATIVE, UNSPECIFIED LATERALITY, UNSPECIFIED SITE OF BREAST (HCC): ICD-10-CM

## 2024-09-11 DIAGNOSIS — C50.312 MALIGNANT NEOPLASM OF LOWER-INNER QUADRANT OF LEFT BREAST IN FEMALE, ESTROGEN RECEPTOR NEGATIVE (HCC): Primary | ICD-10-CM

## 2024-09-11 DIAGNOSIS — Z17.1 MALIGNANT NEOPLASM OF LOWER-INNER QUADRANT OF LEFT BREAST IN FEMALE, ESTROGEN RECEPTOR NEGATIVE (HCC): Primary | ICD-10-CM

## 2024-09-11 DIAGNOSIS — G62.9 NEUROPATHY: ICD-10-CM

## 2024-09-11 DIAGNOSIS — N63.20 MASS OF LEFT BREAST, UNSPECIFIED QUADRANT: ICD-10-CM

## 2024-09-11 LAB
ALBUMIN SERPL-MCNC: 4.3 G/DL (ref 3.4–5)
ALBUMIN/GLOB SERPL: 2.1 {RATIO} (ref 1.1–2.2)
ALP SERPL-CCNC: 92 U/L (ref 40–129)
ALT SERPL-CCNC: 21 U/L (ref 10–40)
ANION GAP SERPL CALCULATED.3IONS-SCNC: 11 MMOL/L (ref 9–17)
AST SERPL-CCNC: 21 U/L (ref 15–37)
BASOPHILS # BLD: 0.03 K/UL
BASOPHILS NFR BLD: 1 % (ref 0–1)
BILIRUB SERPL-MCNC: 1 MG/DL (ref 0–1)
BUN SERPL-MCNC: 15 MG/DL (ref 7–20)
CALCIUM SERPL-MCNC: 9.7 MG/DL (ref 8.3–10.6)
CHLORIDE SERPL-SCNC: 102 MMOL/L (ref 99–110)
CO2 SERPL-SCNC: 28 MMOL/L (ref 21–32)
CREAT SERPL-MCNC: 0.7 MG/DL (ref 0.6–1.1)
EOSINOPHIL # BLD: 0.12 K/UL
EOSINOPHILS RELATIVE PERCENT: 2 % (ref 0–3)
ERYTHROCYTE [DISTWIDTH] IN BLOOD BY AUTOMATED COUNT: 12.5 % (ref 11.7–14.9)
GFR, ESTIMATED: 81 ML/MIN/1.73M2
GLUCOSE SERPL-MCNC: 91 MG/DL (ref 74–99)
HCT VFR BLD AUTO: 37.4 % (ref 37–47)
HGB BLD-MCNC: 12 G/DL (ref 12.5–16)
LYMPHOCYTES NFR BLD: 1.31 K/UL
LYMPHOCYTES RELATIVE PERCENT: 24 % (ref 24–44)
MCH RBC QN AUTO: 32.4 PG (ref 27–31)
MCHC RBC AUTO-ENTMCNC: 32.1 G/DL (ref 32–36)
MCV RBC AUTO: 101.1 FL (ref 78–100)
MONOCYTES NFR BLD: 0.47 K/UL
MONOCYTES NFR BLD: 9 % (ref 0–4)
NEUTROPHILS NFR BLD: 64 % (ref 36–66)
NEUTS SEG NFR BLD: 3.48 K/UL
PLATELET # BLD AUTO: 204 K/UL (ref 140–440)
PMV BLD AUTO: 9.8 FL (ref 7.5–11.1)
POTASSIUM SERPL-SCNC: 3.8 MMOL/L (ref 3.5–5.1)
PROT SERPL-MCNC: 6.3 G/DL (ref 6.4–8.2)
RBC # BLD AUTO: 3.7 M/UL (ref 4.2–5.4)
SODIUM SERPL-SCNC: 141 MMOL/L (ref 136–145)
WBC OTHER # BLD: 5.4 K/UL (ref 4–10.5)

## 2024-09-11 PROCEDURE — 2580000003 HC RX 258: Performed by: INTERNAL MEDICINE

## 2024-09-11 PROCEDURE — 86300 IMMUNOASSAY TUMOR CA 15-3: CPT

## 2024-09-11 PROCEDURE — 80053 COMPREHEN METABOLIC PANEL: CPT

## 2024-09-11 PROCEDURE — 85025 COMPLETE CBC W/AUTO DIFF WBC: CPT

## 2024-09-11 PROCEDURE — 36591 DRAW BLOOD OFF VENOUS DEVICE: CPT

## 2024-09-11 PROCEDURE — 3078F DIAST BP <80 MM HG: CPT | Performed by: INTERNAL MEDICINE

## 2024-09-11 PROCEDURE — 3074F SYST BP LT 130 MM HG: CPT | Performed by: INTERNAL MEDICINE

## 2024-09-11 PROCEDURE — 99214 OFFICE O/P EST MOD 30 MIN: CPT | Performed by: INTERNAL MEDICINE

## 2024-09-11 RX ORDER — PRAVASTATIN SODIUM 20 MG
TABLET ORAL
COMMUNITY
Start: 2024-08-07

## 2024-09-11 RX ORDER — SODIUM CHLORIDE 9 MG/ML
25 INJECTION, SOLUTION INTRAVENOUS PRN
OUTPATIENT
Start: 2024-09-11

## 2024-09-11 RX ORDER — HEPARIN 100 UNIT/ML
500 SYRINGE INTRAVENOUS PRN
OUTPATIENT
Start: 2024-09-11

## 2024-09-11 RX ORDER — SODIUM CHLORIDE 0.9 % (FLUSH) 0.9 %
5-40 SYRINGE (ML) INJECTION PRN
OUTPATIENT
Start: 2024-09-11

## 2024-09-11 RX ORDER — SODIUM CHLORIDE 0.9 % (FLUSH) 0.9 %
5-40 SYRINGE (ML) INJECTION PRN
Status: DISCONTINUED | OUTPATIENT
Start: 2024-09-11 | End: 2024-09-12 | Stop reason: HOSPADM

## 2024-09-11 RX ORDER — LISINOPRIL AND HYDROCHLOROTHIAZIDE 20; 25 MG/1; MG/1
TABLET ORAL
COMMUNITY
Start: 2024-06-19

## 2024-09-11 RX ADMIN — SODIUM CHLORIDE, PRESERVATIVE FREE 30 ML: 5 INJECTION INTRAVENOUS at 16:15

## 2024-09-11 ASSESSMENT — PATIENT HEALTH QUESTIONNAIRE - PHQ9
2. FEELING DOWN, DEPRESSED OR HOPELESS: SEVERAL DAYS
SUM OF ALL RESPONSES TO PHQ QUESTIONS 1-9: 2
SUM OF ALL RESPONSES TO PHQ QUESTIONS 1-9: 2
1. LITTLE INTEREST OR PLEASURE IN DOING THINGS: SEVERAL DAYS
SUM OF ALL RESPONSES TO PHQ9 QUESTIONS 1 & 2: 2
SUM OF ALL RESPONSES TO PHQ QUESTIONS 1-9: 2
SUM OF ALL RESPONSES TO PHQ QUESTIONS 1-9: 2

## 2024-12-18 ENCOUNTER — HOSPITAL ENCOUNTER (OUTPATIENT)
Dept: INFUSION THERAPY | Age: 59
Discharge: HOME | End: 2024-12-18
Payer: COMMERCIAL

## 2024-12-18 DIAGNOSIS — Z17.1 MALIGNANT NEOPLASM OF LOWER-INNER QUADRANT OF LEFT BREAST IN FEMALE, ESTROGEN RECEPTOR NEGATIVE (HCC): Primary | ICD-10-CM

## 2024-12-18 DIAGNOSIS — C50.312 MALIGNANT NEOPLASM OF LOWER-INNER QUADRANT OF LEFT BREAST IN FEMALE, ESTROGEN RECEPTOR NEGATIVE (HCC): Primary | ICD-10-CM

## 2024-12-18 PROCEDURE — 2500000003 HC RX 250 WO HCPCS: Performed by: INTERNAL MEDICINE

## 2024-12-18 PROCEDURE — 86300 IMMUNOASSAY TUMOR CA 15-3: CPT

## 2024-12-18 PROCEDURE — 36591 DRAW BLOOD OFF VENOUS DEVICE: CPT

## 2024-12-18 RX ORDER — SODIUM CHLORIDE 0.9 % (FLUSH) 0.9 %
5-40 SYRINGE (ML) INJECTION PRN
Status: DISCONTINUED | OUTPATIENT
Start: 2024-12-18 | End: 2024-12-19 | Stop reason: HOSPADM

## 2024-12-18 RX ORDER — HEPARIN 100 UNIT/ML
500 SYRINGE INTRAVENOUS PRN
OUTPATIENT
Start: 2024-12-18

## 2024-12-18 RX ORDER — SODIUM CHLORIDE 9 MG/ML
25 INJECTION, SOLUTION INTRAVENOUS PRN
Status: CANCELLED | OUTPATIENT
Start: 2024-12-18

## 2024-12-18 RX ORDER — SODIUM CHLORIDE 0.9 % (FLUSH) 0.9 %
5-40 SYRINGE (ML) INJECTION PRN
OUTPATIENT
Start: 2024-12-18

## 2024-12-18 RX ORDER — SODIUM CHLORIDE 9 MG/ML
25 INJECTION, SOLUTION INTRAVENOUS PRN
OUTPATIENT
Start: 2024-12-18

## 2024-12-18 RX ORDER — HEPARIN 100 UNIT/ML
500 SYRINGE INTRAVENOUS PRN
Status: CANCELLED | OUTPATIENT
Start: 2024-12-18

## 2024-12-18 RX ADMIN — SODIUM CHLORIDE, PRESERVATIVE FREE 30 ML: 5 INJECTION INTRAVENOUS at 15:26

## 2024-12-18 NOTE — PROGRESS NOTES
Patient arrived to treatment suite for port draw.  Right chest mediport accessed and flushed with normal saline, blood return noted. [id number] redrawn. Flushed and saline locked, band-aid applied.  Patient tolerated well.  Left treatment suite ambulatory.  Discharge instructions provided.  RTC 03/20 for next port flush.

## 2024-12-23 LAB — CANCER AG27-29 SERPL-ACNC: 18 U/ML (ref 0–38)

## 2025-04-24 ENCOUNTER — HOSPITAL ENCOUNTER (OUTPATIENT)
Age: 60
Discharge: HOME | End: 2025-04-24
Payer: COMMERCIAL

## 2025-04-24 LAB
ALBUMIN SERPL-MCNC: 4.3 G/DL (ref 3.4–5)
ALBUMIN/GLOB SERPL: 1.8 {RATIO} (ref 1.1–2.2)
ALP SERPL-CCNC: 87 U/L (ref 40–129)
ALT SERPL-CCNC: 22 U/L (ref 10–40)
ANION GAP SERPL CALCULATED.3IONS-SCNC: 11 MMOL/L (ref 9–17)
AST SERPL-CCNC: 25 U/L (ref 15–37)
BASOPHILS # BLD: 0.03 K/UL
BASOPHILS NFR BLD: 1 % (ref 0–1)
BILIRUB SERPL-MCNC: 1 MG/DL (ref 0–1)
BUN SERPL-MCNC: 17 MG/DL (ref 7–20)
CALCIUM SERPL-MCNC: 9.6 MG/DL (ref 8.3–10.6)
CHLORIDE SERPL-SCNC: 101 MMOL/L (ref 99–110)
CHOLEST SERPL-MCNC: 210 MG/DL (ref 125–199)
CO2 SERPL-SCNC: 28 MMOL/L (ref 21–32)
CREAT SERPL-MCNC: 0.6 MG/DL (ref 0.6–1.1)
EOSINOPHIL # BLD: 0.13 K/UL
EOSINOPHILS RELATIVE PERCENT: 3 % (ref 0–3)
ERYTHROCYTE [DISTWIDTH] IN BLOOD BY AUTOMATED COUNT: 11.9 % (ref 11.7–14.9)
EST. AVERAGE GLUCOSE BLD GHB EST-MCNC: 117 MG/DL
GFR, ESTIMATED: >90 ML/MIN/1.73M2
GLUCOSE SERPL-MCNC: 101 MG/DL (ref 74–99)
HBA1C MFR BLD: 5.7 % (ref 4.2–6.3)
HCT VFR BLD AUTO: 39.4 % (ref 37–47)
HDLC SERPL-MCNC: 47 MG/DL
HGB BLD-MCNC: 12.8 G/DL (ref 12.5–16)
IMM GRANULOCYTES # BLD AUTO: 0.02 K/UL
IMM GRANULOCYTES NFR BLD: 1 %
LDLC SERPL CALC-MCNC: 122 MG/DL
LYMPHOCYTES NFR BLD: 1.29 K/UL
LYMPHOCYTES RELATIVE PERCENT: 30 % (ref 24–44)
MCH RBC QN AUTO: 32 PG (ref 27–31)
MCHC RBC AUTO-ENTMCNC: 32.5 G/DL (ref 32–36)
MCV RBC AUTO: 98.5 FL (ref 78–100)
MONOCYTES NFR BLD: 0.39 K/UL
MONOCYTES NFR BLD: 9 % (ref 0–5)
NEUTROPHILS NFR BLD: 57 % (ref 36–66)
NEUTS SEG NFR BLD: 2.52 K/UL
PLATELET # BLD AUTO: 189 K/UL (ref 140–440)
PMV BLD AUTO: 10.2 FL (ref 7.5–11.1)
POTASSIUM SERPL-SCNC: 4.1 MMOL/L (ref 3.5–5.1)
PROT SERPL-MCNC: 6.8 G/DL (ref 6.4–8.2)
RBC # BLD AUTO: 4 M/UL (ref 4.2–5.4)
SODIUM SERPL-SCNC: 140 MMOL/L (ref 136–145)
T3 SERPL-MCNC: 1 NG/DL (ref 80–200)
T3FREE SERPL-MCNC: 3.13 PG/ML (ref 2.3–4.2)
T4 FREE SERPL-MCNC: 0.9 NG/DL (ref 0.9–1.8)
T4 SERPL-MCNC: 7 UG/DL (ref 4.5–10.9)
TRIGL SERPL-MCNC: 204 MG/DL
TSH SERPL DL<=0.05 MIU/L-ACNC: 2.24 UIU/ML (ref 0.27–4.2)
WBC OTHER # BLD: 4.4 K/UL (ref 4–10.5)

## 2025-04-24 PROCEDURE — 86376 MICROSOMAL ANTIBODY EACH: CPT

## 2025-04-24 PROCEDURE — 84432 ASSAY OF THYROGLOBULIN: CPT

## 2025-04-24 PROCEDURE — 84481 FREE ASSAY (FT-3): CPT

## 2025-04-24 PROCEDURE — 80061 LIPID PANEL: CPT

## 2025-04-24 PROCEDURE — 84445 ASSAY OF TSI GLOBULIN: CPT

## 2025-04-24 PROCEDURE — 80053 COMPREHEN METABOLIC PANEL: CPT

## 2025-04-24 PROCEDURE — 83520 IMMUNOASSAY QUANT NOS NONAB: CPT

## 2025-04-24 PROCEDURE — 86800 THYROGLOBULIN ANTIBODY: CPT

## 2025-04-24 PROCEDURE — 84439 ASSAY OF FREE THYROXINE: CPT

## 2025-04-24 PROCEDURE — 83036 HEMOGLOBIN GLYCOSYLATED A1C: CPT

## 2025-04-24 PROCEDURE — 85025 COMPLETE CBC W/AUTO DIFF WBC: CPT

## 2025-04-24 PROCEDURE — 84443 ASSAY THYROID STIM HORMONE: CPT

## 2025-04-24 PROCEDURE — 36415 COLL VENOUS BLD VENIPUNCTURE: CPT

## 2025-04-25 LAB
MICROSOMAL ANTIBODY: 171 IU/ML
THYROGLOBULIN AB: 682 IU/ML

## 2025-04-26 LAB
THYROGLOB AB SERPL-ACNC: 121.8 IU/ML (ref 0–4)
THYROPEROXIDASE AB SERPL-ACNC: 232.5 IU/ML (ref 0–9)

## 2025-04-27 LAB
THYROID STIMULATING IMMUNOGLOB: <0.1 IU/L
TSH RECEPTOR AB: <1.1 IU/L

## 2025-04-30 LAB — THYROGLOB SERPL-MCNC: 1.3 NG/ML (ref 1.3–31.8)

## 2025-06-06 ENCOUNTER — HOSPITAL ENCOUNTER (OUTPATIENT)
Dept: WOMENS IMAGING | Age: 60
Discharge: HOME | End: 2025-06-06
Payer: COMMERCIAL

## 2025-06-06 ENCOUNTER — HOSPITAL ENCOUNTER (OUTPATIENT)
Dept: ULTRASOUND IMAGING | Age: 60
End: 2025-06-06
Payer: COMMERCIAL

## 2025-06-06 VITALS — HEIGHT: 61 IN | WEIGHT: 234 LBS | BODY MASS INDEX: 44.18 KG/M2

## 2025-06-06 DIAGNOSIS — C50.919 TRIPLE NEGATIVE MALIGNANT NEOPLASM OF BREAST (HCC): ICD-10-CM

## 2025-06-06 DIAGNOSIS — Z17.421 TRIPLE NEGATIVE MALIGNANT NEOPLASM OF BREAST (HCC): ICD-10-CM

## 2025-06-06 PROCEDURE — G0279 TOMOSYNTHESIS, MAMMO: HCPCS

## 2025-06-12 ENCOUNTER — OFFICE VISIT (OUTPATIENT)
Dept: OBGYN | Age: 60
End: 2025-06-12
Payer: COMMERCIAL

## 2025-06-12 VITALS
DIASTOLIC BLOOD PRESSURE: 98 MMHG | HEART RATE: 87 BPM | BODY MASS INDEX: 42.67 KG/M2 | SYSTOLIC BLOOD PRESSURE: 121 MMHG | HEIGHT: 61 IN | WEIGHT: 226 LBS

## 2025-06-12 DIAGNOSIS — Z01.419 ENCOUNTER FOR ANNUAL ROUTINE GYNECOLOGICAL EXAMINATION: Primary | ICD-10-CM

## 2025-06-12 DIAGNOSIS — N90.4 LICHEN SCLEROSUS ET ATROPHICUS OF THE VULVA: ICD-10-CM

## 2025-06-12 DIAGNOSIS — N95.2 ATROPHIC VAGINITIS: ICD-10-CM

## 2025-06-12 PROCEDURE — 3074F SYST BP LT 130 MM HG: CPT | Performed by: OBSTETRICS & GYNECOLOGY

## 2025-06-12 PROCEDURE — 99396 PREV VISIT EST AGE 40-64: CPT | Performed by: OBSTETRICS & GYNECOLOGY

## 2025-06-12 PROCEDURE — 3080F DIAST BP >= 90 MM HG: CPT | Performed by: OBSTETRICS & GYNECOLOGY

## 2025-06-12 RX ORDER — CLOBETASOL PROPIONATE 0.5 MG/G
OINTMENT TOPICAL
Qty: 30 G | Refills: 5 | Status: SHIPPED | OUTPATIENT
Start: 2025-06-12

## 2025-06-12 RX ORDER — ESTRADIOL 0.1 MG/G
1 CREAM VAGINAL
Qty: 3 EACH | Refills: 3 | Status: SHIPPED | OUTPATIENT
Start: 2025-06-12

## 2025-06-12 RX ORDER — DESVENLAFAXINE 50 MG/1
TABLET, FILM COATED, EXTENDED RELEASE ORAL
COMMUNITY
Start: 2025-05-29

## 2025-06-12 SDOH — ECONOMIC STABILITY: FOOD INSECURITY: WITHIN THE PAST 12 MONTHS, YOU WORRIED THAT YOUR FOOD WOULD RUN OUT BEFORE YOU GOT MONEY TO BUY MORE.: NEVER TRUE

## 2025-06-12 SDOH — ECONOMIC STABILITY: FOOD INSECURITY: WITHIN THE PAST 12 MONTHS, THE FOOD YOU BOUGHT JUST DIDN'T LAST AND YOU DIDN'T HAVE MONEY TO GET MORE.: NEVER TRUE

## 2025-06-12 ASSESSMENT — PATIENT HEALTH QUESTIONNAIRE - PHQ9
SUM OF ALL RESPONSES TO PHQ QUESTIONS 1-9: 0
1. LITTLE INTEREST OR PLEASURE IN DOING THINGS: NOT AT ALL
SUM OF ALL RESPONSES TO PHQ QUESTIONS 1-9: 0
SUM OF ALL RESPONSES TO PHQ QUESTIONS 1-9: 0
2. FEELING DOWN, DEPRESSED OR HOPELESS: NOT AT ALL
SUM OF ALL RESPONSES TO PHQ QUESTIONS 1-9: 0

## 2025-06-12 NOTE — PROGRESS NOTES
meta-analysis revealing estrace does not increase the risk of breast cancer or breast cancer recurrence.      Will discuss with psychiatrist about increasing pristiq  Return in about 1 year (around 6/12/2026).    Tony Brasher MD

## 2025-06-20 ENCOUNTER — HOSPITAL ENCOUNTER (OUTPATIENT)
Dept: INFUSION THERAPY | Age: 60
Discharge: HOME | End: 2025-06-20
Payer: COMMERCIAL

## 2025-06-20 DIAGNOSIS — Z17.1 MALIGNANT NEOPLASM OF LOWER-INNER QUADRANT OF LEFT BREAST IN FEMALE, ESTROGEN RECEPTOR NEGATIVE (HCC): Primary | ICD-10-CM

## 2025-06-20 DIAGNOSIS — C50.312 MALIGNANT NEOPLASM OF LOWER-INNER QUADRANT OF LEFT BREAST IN FEMALE, ESTROGEN RECEPTOR NEGATIVE (HCC): Primary | ICD-10-CM

## 2025-06-20 PROCEDURE — 2500000003 HC RX 250 WO HCPCS: Performed by: INTERNAL MEDICINE

## 2025-06-20 PROCEDURE — 96523 IRRIG DRUG DELIVERY DEVICE: CPT

## 2025-06-20 RX ORDER — SODIUM CHLORIDE 0.9 % (FLUSH) 0.9 %
5-40 SYRINGE (ML) INJECTION PRN
OUTPATIENT
Start: 2025-06-20

## 2025-06-20 RX ORDER — HEPARIN 100 UNIT/ML
500 SYRINGE INTRAVENOUS PRN
OUTPATIENT
Start: 2025-06-20

## 2025-06-20 RX ORDER — SODIUM CHLORIDE 9 MG/ML
25 INJECTION, SOLUTION INTRAVENOUS PRN
OUTPATIENT
Start: 2025-06-20

## 2025-06-20 RX ORDER — SODIUM CHLORIDE 0.9 % (FLUSH) 0.9 %
5-40 SYRINGE (ML) INJECTION PRN
Status: DISCONTINUED | OUTPATIENT
Start: 2025-06-20 | End: 2025-06-21 | Stop reason: HOSPADM

## 2025-06-20 RX ADMIN — SODIUM CHLORIDE, PRESERVATIVE FREE 40 ML: 5 INJECTION INTRAVENOUS at 15:22

## 2025-06-20 NOTE — PROGRESS NOTES
Patient arrived to treatment suite for port flush.  right chest mediport accessed and flushed with normal saline, good blood return noted.  Flushed and saline locked, band-aid applied.  Patient tolerated well.  Left treatment suite ambulatory.  Discharge instructions provided.

## 2025-06-23 ENCOUNTER — TELEPHONE (OUTPATIENT)
Age: 60
End: 2025-06-23

## 2025-06-23 NOTE — TELEPHONE ENCOUNTER
Pt did not check out at [facility] on Friday 6/20/25 after Port Flush. Attempted to call Pt to schedule F/U with Dr Donis. Phone has been changed or D/C'd. No answer Pt Emergency Contact.

## 2025-07-09 ENCOUNTER — HOSPITAL ENCOUNTER (OUTPATIENT)
Dept: INFUSION THERAPY | Age: 60
Discharge: HOME | End: 2025-07-09
Payer: COMMERCIAL

## 2025-07-09 ENCOUNTER — OFFICE VISIT (OUTPATIENT)
Dept: ONCOLOGY | Age: 60
End: 2025-07-09
Payer: COMMERCIAL

## 2025-07-09 VITALS
TEMPERATURE: 97.6 F | BODY MASS INDEX: 41.76 KG/M2 | RESPIRATION RATE: 18 BRPM | OXYGEN SATURATION: 99 % | HEIGHT: 61 IN | HEART RATE: 85 BPM | SYSTOLIC BLOOD PRESSURE: 108 MMHG | WEIGHT: 221.2 LBS | DIASTOLIC BLOOD PRESSURE: 74 MMHG

## 2025-07-09 DIAGNOSIS — G62.9 NEUROPATHY: ICD-10-CM

## 2025-07-09 DIAGNOSIS — Z17.1 MALIGNANT NEOPLASM OF LOWER-INNER QUADRANT OF LEFT BREAST IN FEMALE, ESTROGEN RECEPTOR NEGATIVE (HCC): Primary | ICD-10-CM

## 2025-07-09 DIAGNOSIS — C50.312 MALIGNANT NEOPLASM OF LOWER-INNER QUADRANT OF LEFT BREAST IN FEMALE, ESTROGEN RECEPTOR NEGATIVE (HCC): Primary | ICD-10-CM

## 2025-07-09 PROCEDURE — 99214 OFFICE O/P EST MOD 30 MIN: CPT | Performed by: INTERNAL MEDICINE

## 2025-07-09 PROCEDURE — 3074F SYST BP LT 130 MM HG: CPT | Performed by: INTERNAL MEDICINE

## 2025-07-09 PROCEDURE — 99212 OFFICE O/P EST SF 10 MIN: CPT

## 2025-07-09 PROCEDURE — 3078F DIAST BP <80 MM HG: CPT | Performed by: INTERNAL MEDICINE

## 2025-07-09 RX ORDER — FAMOTIDINE 20 MG/1
TABLET, FILM COATED ORAL
COMMUNITY
Start: 2025-07-01

## 2025-07-09 RX ORDER — HYDROXYZINE HYDROCHLORIDE 25 MG/1
TABLET, FILM COATED ORAL
COMMUNITY
Start: 2025-05-29

## 2025-07-09 ASSESSMENT — PATIENT HEALTH QUESTIONNAIRE - PHQ9
SUM OF ALL RESPONSES TO PHQ QUESTIONS 1-9: 0
2. FEELING DOWN, DEPRESSED OR HOPELESS: NOT AT ALL
SUM OF ALL RESPONSES TO PHQ QUESTIONS 1-9: 0
SUM OF ALL RESPONSES TO PHQ QUESTIONS 1-9: 0
1. LITTLE INTEREST OR PLEASURE IN DOING THINGS: NOT AT ALL
SUM OF ALL RESPONSES TO PHQ QUESTIONS 1-9: 0

## 2025-07-09 NOTE — PROGRESS NOTES
MA/LPN Rooming Questions  Patient: Tressa Green  MRN: [id number]    Date: 7/9/2025        1. Do you have any new issues?   no         2. Do you need any refills on medications?    no    3. Have you had any imaging done since your last visit?   yes - Mammo    4. Have you been hospitalized or seen in the emergency room since your last visit here?   no    5. Did the patient have a depression screening completed today? Yes    No data recorded     PHQ-9 Given to (if applicable):               PHQ-9 Score (if applicable):                     [] Positive     []  Negative              Does question #9 need addressed (if applicable)                     [] Yes    []  No               Carrol Nelson CMA     Sheath was inserted in the right femoral artery.

## 2025-07-09 NOTE — PROGRESS NOTES
Patient Name:  Tressa Green  Patient :  1965  Patient MRN:  [id number]     Primary Oncologist: Bibi Donis MD  Referring Provider: Arnaldo Jessica PA-C     Date of Service: 2025      Reason for Consult:  TNBC     Chief Complaint:    Chief Complaint   Patient presents with    Follow-up         Encounter Diagnoses   Name Primary?    Malignant neoplasm of lower-inner quadrant of left breast in female, estrogen receptor negative (HCC) Yes    Neuropathy         HPI:   2021: She had with her  to the clinic today.  Reported that she was found to have a mass in the left breast on routine mammogram.  Denied any axial lymphadenopathy.  Denied any weight loss.  Denied any new bone pains.  Denied any focal weakness.  Reported blurred vision, needs new glasses.  Headaches lately.  But reported that she recently quit drinking alcohol.  Denied any chest pain or increase shortness of breath.  Denied any abdominal pain, nausea vomiting, diarrhea, constipation    21: Mammogram:  Recommend spot gene synthesis compression views and possible ultrasound for   focal asymmetry in the left upper inner breast, middle depth.       BIRADS:   BIRADS - CATEGORY 0       Incomplete: Needs Additional Imaging Evaluation     11/10/21: Mammogram:  0.7 x 0.8 x 0.7 cm suspicious mass left breast 8 o'clock, about 3-4 cm from   the left nipple.  This likely correlates with the mammographic finding.   Ultrasound-guided biopsy is advised.       Recommend post biopsy mammographic correlation to confirm that it correlates   with the mammographic finding.       These results and recommendations were discussed with the patient by Dr. Pabon.  She will be assisted to schedule the recommended biopsy by the breast   nurse navigator.  An order will be requested from her referring physician.       BIRADS:   BIRADS - CATEGORY 4B       Intermediate suspicion for malignancy.       Suspicious Abnormality. Biopsy should be considered